# Patient Record
Sex: FEMALE | Race: WHITE | NOT HISPANIC OR LATINO | Employment: FULL TIME | URBAN - METROPOLITAN AREA
[De-identification: names, ages, dates, MRNs, and addresses within clinical notes are randomized per-mention and may not be internally consistent; named-entity substitution may affect disease eponyms.]

---

## 2019-05-07 ENCOUNTER — TRANSCRIBE ORDERS (OUTPATIENT)
Dept: LAB | Facility: HOSPITAL | Age: 24
End: 2019-05-07

## 2019-05-07 ENCOUNTER — LAB (OUTPATIENT)
Dept: LAB | Facility: HOSPITAL | Age: 24
End: 2019-05-07

## 2019-05-07 ENCOUNTER — LAB REQUISITION (OUTPATIENT)
Dept: LAB | Facility: HOSPITAL | Age: 24
End: 2019-05-07

## 2019-05-07 DIAGNOSIS — Z34.81 PRENATAL CARE, SUBSEQUENT PREGNANCY, FIRST TRIMESTER: ICD-10-CM

## 2019-05-07 DIAGNOSIS — Z3A.12 12 WEEKS GESTATION OF PREGNANCY: Primary | ICD-10-CM

## 2019-05-07 DIAGNOSIS — Z3A.12 12 WEEKS GESTATION OF PREGNANCY: ICD-10-CM

## 2019-05-07 DIAGNOSIS — Z00.00 ROUTINE GENERAL MEDICAL EXAMINATION AT A HEALTH CARE FACILITY: ICD-10-CM

## 2019-05-07 LAB
ABO GROUP BLD: NORMAL
BASOPHILS # BLD AUTO: 0.05 10*3/MM3 (ref 0–0.2)
BASOPHILS NFR BLD AUTO: 0.6 % (ref 0–1.5)
BLD GP AB SCN SERPL QL: NEGATIVE
DEPRECATED RDW RBC AUTO: 44.3 FL (ref 37–54)
EOSINOPHIL # BLD AUTO: 0.34 10*3/MM3 (ref 0–0.4)
EOSINOPHIL NFR BLD AUTO: 3.8 % (ref 0.3–6.2)
ERYTHROCYTE [DISTWIDTH] IN BLOOD BY AUTOMATED COUNT: 13.3 % (ref 12.3–15.4)
GLUCOSE BLD-MCNC: 71 MG/DL (ref 65–99)
HBV SURFACE AG SERPL QL IA: NORMAL
HCT VFR BLD AUTO: 41.8 % (ref 34–46.6)
HCV AB SER DONR QL: NORMAL
HGB BLD-MCNC: 13.6 G/DL (ref 12–15.9)
HIV1+2 AB SER QL: NORMAL
IMM GRANULOCYTES # BLD AUTO: 0.04 10*3/MM3 (ref 0–0.05)
IMM GRANULOCYTES NFR BLD AUTO: 0.4 % (ref 0–0.5)
LYMPHOCYTES # BLD AUTO: 1.89 10*3/MM3 (ref 0.7–3.1)
LYMPHOCYTES NFR BLD AUTO: 21.2 % (ref 19.6–45.3)
MCH RBC QN AUTO: 29.4 PG (ref 26.6–33)
MCHC RBC AUTO-ENTMCNC: 32.5 G/DL (ref 31.5–35.7)
MCV RBC AUTO: 90.5 FL (ref 79–97)
MONOCYTES # BLD AUTO: 0.57 10*3/MM3 (ref 0.1–0.9)
MONOCYTES NFR BLD AUTO: 6.4 % (ref 5–12)
NEUTROPHILS # BLD AUTO: 6.04 10*3/MM3 (ref 1.7–7)
NEUTROPHILS NFR BLD AUTO: 67.6 % (ref 42.7–76)
NRBC BLD AUTO-RTO: 0 /100 WBC (ref 0–0.2)
PLATELET # BLD AUTO: 315 10*3/MM3 (ref 140–450)
PMV BLD AUTO: 12.2 FL (ref 6–12)
RBC # BLD AUTO: 4.62 10*6/MM3 (ref 3.77–5.28)
RH BLD: POSITIVE
WBC NRBC COR # BLD: 8.93 10*3/MM3 (ref 3.4–10.8)

## 2019-05-07 PROCEDURE — 36415 COLL VENOUS BLD VENIPUNCTURE: CPT

## 2019-05-07 PROCEDURE — 86803 HEPATITIS C AB TEST: CPT

## 2019-05-07 PROCEDURE — 82947 ASSAY GLUCOSE BLOOD QUANT: CPT

## 2019-05-07 PROCEDURE — 80081 OBSTETRIC PANEL INC HIV TSTG: CPT

## 2019-05-08 LAB
RPR SER QL: NORMAL
RUBV IGG SERPL IA-ACNC: NEGATIVE

## 2019-06-04 ENCOUNTER — LAB REQUISITION (OUTPATIENT)
Dept: LAB | Facility: HOSPITAL | Age: 24
End: 2019-06-04

## 2019-06-04 DIAGNOSIS — Z00.00 ROUTINE GENERAL MEDICAL EXAMINATION AT A HEALTH CARE FACILITY: ICD-10-CM

## 2019-06-04 PROCEDURE — 36415 COLL VENOUS BLD VENIPUNCTURE: CPT

## 2019-06-13 ENCOUNTER — TRANSCRIBE ORDERS (OUTPATIENT)
Dept: OBSTETRICS AND GYNECOLOGY | Facility: HOSPITAL | Age: 24
End: 2019-06-13

## 2019-06-13 DIAGNOSIS — O28.5 ABNORMAL GENETIC TEST DURING PREGNANCY: Primary | ICD-10-CM

## 2019-06-24 ENCOUNTER — OFFICE VISIT (OUTPATIENT)
Dept: OBSTETRICS AND GYNECOLOGY | Facility: HOSPITAL | Age: 24
End: 2019-06-24

## 2019-06-24 ENCOUNTER — APPOINTMENT (OUTPATIENT)
Dept: WOMENS IMAGING | Facility: HOSPITAL | Age: 24
End: 2019-06-24

## 2019-06-24 ENCOUNTER — HOSPITAL ENCOUNTER (OUTPATIENT)
Dept: WOMENS IMAGING | Facility: HOSPITAL | Age: 24
Discharge: HOME | End: 2019-06-24
Admitting: OBSTETRICS & GYNECOLOGY

## 2019-06-24 VITALS
HEIGHT: 60 IN | WEIGHT: 161.8 LBS | BODY MASS INDEX: 31.77 KG/M2 | DIASTOLIC BLOOD PRESSURE: 67 MMHG | SYSTOLIC BLOOD PRESSURE: 108 MMHG

## 2019-06-24 DIAGNOSIS — O44.20 MARGINAL PLACENTA PREVIA: ICD-10-CM

## 2019-06-24 DIAGNOSIS — O28.5 ABNORMAL GENETIC TEST DURING PREGNANCY: Primary | ICD-10-CM

## 2019-06-24 DIAGNOSIS — O28.5 ABNORMAL GENETIC TEST DURING PREGNANCY: ICD-10-CM

## 2019-06-24 DIAGNOSIS — Z34.90 PREGNANCY, UNSPECIFIED GESTATIONAL AGE: ICD-10-CM

## 2019-06-24 PROCEDURE — 76817 TRANSVAGINAL US OBSTETRIC: CPT

## 2019-06-24 PROCEDURE — 76811 OB US DETAILED SNGL FETUS: CPT

## 2019-06-24 PROCEDURE — 76817 TRANSVAGINAL US OBSTETRIC: CPT | Performed by: OBSTETRICS & GYNECOLOGY

## 2019-06-24 PROCEDURE — 76811 OB US DETAILED SNGL FETUS: CPT | Performed by: OBSTETRICS & GYNECOLOGY

## 2019-06-24 RX ORDER — PRENATAL WITH FERROUS FUM AND FOLIC ACID 3080; 920; 120; 400; 22; 1.84; 3; 20; 10; 1; 12; 200; 27; 25; 2 [IU]/1; [IU]/1; MG/1; [IU]/1; MG/1; MG/1; MG/1; MG/1; MG/1; MG/1; UG/1; MG/1; MG/1; MG/1; MG/1
1 TABLET ORAL DAILY
COMMUNITY
End: 2022-10-25

## 2019-06-24 NOTE — PROGRESS NOTES
Denies problems. Next OB visit is 7/2/2019. Reports [facility] NIPS noted female gender but no report for Monosomy X twice; was negative for Trisomy 13, 18 & 21.

## 2019-08-17 ENCOUNTER — HOSPITAL ENCOUNTER (OUTPATIENT)
Facility: HOSPITAL | Age: 24
Discharge: HOME | End: 2019-08-17
Attending: OBSTETRICS & GYNECOLOGY | Admitting: OBSTETRICS & GYNECOLOGY

## 2019-08-17 ENCOUNTER — DOCUMENTATION (OUTPATIENT)
Dept: OBSTETRICS AND GYNECOLOGY | Facility: CLINIC | Age: 24
End: 2019-08-17

## 2019-08-17 VITALS
HEIGHT: 60 IN | TEMPERATURE: 98.4 F | DIASTOLIC BLOOD PRESSURE: 81 MMHG | SYSTOLIC BLOOD PRESSURE: 113 MMHG | WEIGHT: 164 LBS | RESPIRATION RATE: 16 BRPM | BODY MASS INDEX: 32.2 KG/M2

## 2019-08-17 LAB
BILIRUB UR QL STRIP: NEGATIVE
CLARITY UR: CLEAR
COLOR UR: YELLOW
GLUCOSE UR STRIP-MCNC: NEGATIVE MG/DL
HGB UR QL STRIP.AUTO: NEGATIVE
KETONES UR QL STRIP: NEGATIVE
LEUKOCYTE ESTERASE UR QL STRIP.AUTO: NEGATIVE
NITRITE UR QL STRIP: NEGATIVE
PH UR STRIP.AUTO: 7 [PH] (ref 5–8)
PROT UR QL STRIP: NEGATIVE
SP GR UR STRIP: <=1.005 (ref 1–1.03)
UROBILINOGEN UR QL STRIP: NORMAL

## 2019-08-17 PROCEDURE — 59025 FETAL NON-STRESS TEST: CPT

## 2019-08-17 PROCEDURE — 81003 URINALYSIS AUTO W/O SCOPE: CPT | Performed by: OBSTETRICS & GYNECOLOGY

## 2019-08-17 PROCEDURE — G0463 HOSPITAL OUTPT CLINIC VISIT: HCPCS

## 2019-08-17 PROCEDURE — 87086 URINE CULTURE/COLONY COUNT: CPT | Performed by: OBSTETRICS & GYNECOLOGY

## 2019-08-18 NOTE — PROGRESS NOTES
Erin Mckeon  1995  [id number]  [id number]    CC: contractions  HPI:  Patient is 24 y.o. female   currently at 26w6d presents with c/o uterine contractions, onset ~1600, intermittent, non-painful, denies assoc vag bleeding or SROM, non-radiating, denies recent intercourse.  Good FM.  BPNC to date.    PMH:  Current meds: PNV  Illnesses: none  Surgeries: none  Allergies: NKDA    Past OB History:       Obstetric History       T0      L0     SAB0   TAB0   Ectopic0   Molar0   Multiple0   Live Births0       # Outcome Date GA Lbr Dwight/2nd Weight Sex Delivery Anes PTL Lv   1 Current                        SH: tob neg , EtOH neg, drugs neg  FH: heart dz neg , diabetes pos , cancer pos    General ROS: contractions.   All other systems reviewed and are negative.      Physical Examination: General appearance - alert, well appearing, and in no distress  Vital signs - LMP 02/10/2019   HEENT: normocephalic, atraumatic,oropharynx clear, appearance of ears and nose normal  Neck - supple, no significant adenopathy, no thyromegaly  Lymphatics - no palpable lymphadenopathy in the neck or groin, no hepatosplenomegaly  Chest - clear to auscultation, no wheezes, rales or rhonchi, respiratory effort non-labored  Heart - normal rate, regular rhythm, normal S1, S2, no murmurs, rubs, clicks or gallops, no JVD, no lower extremity edema  Abdomen - soft, nontender, nondistended, no masses, no hepatosplenomegaly  no rebound tenderness noted, bowel sounds normal  Vaginal Exam: cl/50%/ballot, no blood in vault ,external genitalia normal  Extremities - no pedal edema noted, no calf tend  Skin -warm and dry, normal coloration and turgor, no rashes, no suspicious skin lesions noted      Fetal monitoring:reactive, normal baseline, good variability, no decels, rare contractioins    Radiology     Assessment 1)IUP 26 6/7 weeks    2) contractions- no evid STORM, ccUA neg    Plan 1)observe     2)home    3)keep next sched  appt    Jaxson Jennings MD  8/17/2019  11:32 PM

## 2019-08-19 LAB — BACTERIA SPEC AEROBE CULT: NO GROWTH

## 2019-08-28 ENCOUNTER — TRANSCRIBE ORDERS (OUTPATIENT)
Dept: LAB | Facility: HOSPITAL | Age: 24
End: 2019-08-28

## 2019-08-28 ENCOUNTER — LAB (OUTPATIENT)
Dept: LAB | Facility: HOSPITAL | Age: 24
End: 2019-08-28

## 2019-08-28 DIAGNOSIS — Z3A.28 28 WEEKS GESTATION OF PREGNANCY: Primary | ICD-10-CM

## 2019-08-28 DIAGNOSIS — Z3A.28 28 WEEKS GESTATION OF PREGNANCY: ICD-10-CM

## 2019-08-28 LAB
BLD GP AB SCN SERPL QL: NEGATIVE
DEPRECATED RDW RBC AUTO: 44.4 FL (ref 37–54)
ERYTHROCYTE [DISTWIDTH] IN BLOOD BY AUTOMATED COUNT: 13 % (ref 12.3–15.4)
GLUCOSE 1H P 100 G GLC PO SERPL-MCNC: 90 MG/DL
HCT VFR BLD AUTO: 36.9 % (ref 34–46.6)
HGB BLD-MCNC: 11.9 G/DL (ref 12–15.9)
MCH RBC QN AUTO: 30 PG (ref 26.6–33)
MCHC RBC AUTO-ENTMCNC: 32.2 G/DL (ref 31.5–35.7)
MCV RBC AUTO: 92.9 FL (ref 79–97)
PLATELET # BLD AUTO: 270 10*3/MM3 (ref 140–450)
PMV BLD AUTO: 12.1 FL (ref 6–12)
RBC # BLD AUTO: 3.97 10*6/MM3 (ref 3.77–5.28)
WBC NRBC COR # BLD: 12.79 10*3/MM3 (ref 3.4–10.8)

## 2019-08-28 PROCEDURE — 86850 RBC ANTIBODY SCREEN: CPT

## 2019-08-28 PROCEDURE — 85027 COMPLETE CBC AUTOMATED: CPT

## 2019-08-28 PROCEDURE — 36415 COLL VENOUS BLD VENIPUNCTURE: CPT

## 2019-08-28 PROCEDURE — 82950 GLUCOSE TEST: CPT

## 2019-09-23 ENCOUNTER — HOSPITAL ENCOUNTER (OUTPATIENT)
Dept: WOMENS IMAGING | Facility: HOSPITAL | Age: 24
Discharge: HOME | End: 2019-09-23
Admitting: OBSTETRICS & GYNECOLOGY

## 2019-09-23 ENCOUNTER — OFFICE VISIT (OUTPATIENT)
Dept: OBSTETRICS AND GYNECOLOGY | Facility: HOSPITAL | Age: 24
End: 2019-09-23

## 2019-09-23 VITALS — BODY MASS INDEX: 33.83 KG/M2 | WEIGHT: 173.2 LBS | SYSTOLIC BLOOD PRESSURE: 124 MMHG | DIASTOLIC BLOOD PRESSURE: 74 MMHG

## 2019-09-23 DIAGNOSIS — O28.5 ABNORMAL GENETIC TEST DURING PREGNANCY: Primary | ICD-10-CM

## 2019-09-23 DIAGNOSIS — O28.5 ABNORMAL GENETIC TEST DURING PREGNANCY: ICD-10-CM

## 2019-09-23 DIAGNOSIS — O44.20 MARGINAL PLACENTA PREVIA: ICD-10-CM

## 2019-09-23 DIAGNOSIS — Z3A.32 32 WEEKS GESTATION OF PREGNANCY: ICD-10-CM

## 2019-09-23 DIAGNOSIS — Z34.90 PREGNANCY, UNSPECIFIED GESTATIONAL AGE: ICD-10-CM

## 2019-09-23 PROCEDURE — 76816 OB US FOLLOW-UP PER FETUS: CPT

## 2019-09-23 PROCEDURE — 76816 OB US FOLLOW-UP PER FETUS: CPT | Performed by: OBSTETRICS & GYNECOLOGY

## 2019-10-06 ENCOUNTER — APPOINTMENT (OUTPATIENT)
Dept: CT IMAGING | Facility: HOSPITAL | Age: 24
End: 2019-10-06

## 2019-10-06 ENCOUNTER — APPOINTMENT (OUTPATIENT)
Dept: ULTRASOUND IMAGING | Facility: HOSPITAL | Age: 24
End: 2019-10-06

## 2019-10-06 ENCOUNTER — HOSPITAL ENCOUNTER (OUTPATIENT)
Facility: HOSPITAL | Age: 24
Setting detail: OBSERVATION
Discharge: HOME | End: 2019-10-07
Attending: OBSTETRICS & GYNECOLOGY | Admitting: OBSTETRICS & GYNECOLOGY

## 2019-10-06 PROBLEM — R10.9 ABDOMINAL PAIN IN PREGNANCY, THIRD TRIMESTER: Status: ACTIVE | Noted: 2019-10-06

## 2019-10-06 PROBLEM — O26.893 ABDOMINAL PAIN IN PREGNANCY, THIRD TRIMESTER: Status: ACTIVE | Noted: 2019-10-06

## 2019-10-06 LAB
ALBUMIN SERPL-MCNC: 3.6 G/DL (ref 3.5–5.2)
ALBUMIN/GLOB SERPL: 1.2 G/DL
ALP SERPL-CCNC: 124 U/L (ref 39–117)
ALT SERPL W P-5'-P-CCNC: 22 U/L (ref 1–33)
AMYLASE SERPL-CCNC: 77 U/L (ref 28–100)
ANION GAP SERPL CALCULATED.3IONS-SCNC: 19 MMOL/L (ref 5–15)
AST SERPL-CCNC: 21 U/L (ref 1–32)
BASOPHILS # BLD AUTO: 0.05 10*3/MM3 (ref 0–0.2)
BASOPHILS NFR BLD AUTO: 0.4 % (ref 0–1.5)
BILIRUB SERPL-MCNC: 0.3 MG/DL (ref 0.2–1.2)
BILIRUB UR QL STRIP: NEGATIVE
BUN BLD-MCNC: 6 MG/DL (ref 6–20)
BUN/CREAT SERPL: 13.6 (ref 7–25)
CALCIUM SPEC-SCNC: 8.9 MG/DL (ref 8.6–10.5)
CHLORIDE SERPL-SCNC: 101 MMOL/L (ref 98–107)
CLARITY UR: CLEAR
CO2 SERPL-SCNC: 18 MMOL/L (ref 22–29)
COLOR UR: YELLOW
CREAT BLD-MCNC: 0.44 MG/DL (ref 0.57–1)
DEPRECATED RDW RBC AUTO: 42.2 FL (ref 37–54)
EOSINOPHIL # BLD AUTO: 0.23 10*3/MM3 (ref 0–0.4)
EOSINOPHIL NFR BLD AUTO: 1.7 % (ref 0.3–6.2)
ERYTHROCYTE [DISTWIDTH] IN BLOOD BY AUTOMATED COUNT: 13 % (ref 12.3–15.4)
GFR SERPL CREATININE-BSD FRML MDRD: >150 ML/MIN/1.73
GLOBULIN UR ELPH-MCNC: 2.9 GM/DL
GLUCOSE BLD-MCNC: 96 MG/DL (ref 65–99)
GLUCOSE UR STRIP-MCNC: NEGATIVE MG/DL
HCT VFR BLD AUTO: 38.1 % (ref 34–46.6)
HGB BLD-MCNC: 12.4 G/DL (ref 12–15.9)
HGB UR QL STRIP.AUTO: NEGATIVE
IMM GRANULOCYTES # BLD AUTO: 0.19 10*3/MM3 (ref 0–0.05)
IMM GRANULOCYTES NFR BLD AUTO: 1.4 % (ref 0–0.5)
KETONES UR QL STRIP: ABNORMAL
LDH SERPL-CCNC: 187 U/L (ref 135–214)
LEUKOCYTE ESTERASE UR QL STRIP.AUTO: NEGATIVE
LIPASE SERPL-CCNC: 15 U/L (ref 13–60)
LYMPHOCYTES # BLD AUTO: 1.68 10*3/MM3 (ref 0.7–3.1)
LYMPHOCYTES NFR BLD AUTO: 12.5 % (ref 19.6–45.3)
MCH RBC QN AUTO: 29.1 PG (ref 26.6–33)
MCHC RBC AUTO-ENTMCNC: 32.5 G/DL (ref 31.5–35.7)
MCV RBC AUTO: 89.4 FL (ref 79–97)
MONOCYTES # BLD AUTO: 0.74 10*3/MM3 (ref 0.1–0.9)
MONOCYTES NFR BLD AUTO: 5.5 % (ref 5–12)
NEUTROPHILS # BLD AUTO: 10.56 10*3/MM3 (ref 1.7–7)
NEUTROPHILS NFR BLD AUTO: 78.5 % (ref 42.7–76)
NITRITE UR QL STRIP: NEGATIVE
NRBC BLD AUTO-RTO: 0 /100 WBC (ref 0–0.2)
PH UR STRIP.AUTO: 6.5 [PH] (ref 5–8)
PLATELET # BLD AUTO: 253 10*3/MM3 (ref 140–450)
PMV BLD AUTO: 11.2 FL (ref 6–12)
POTASSIUM BLD-SCNC: 2.8 MMOL/L (ref 3.5–5.2)
PROT SERPL-MCNC: 6.5 G/DL (ref 6–8.5)
PROT UR QL STRIP: NEGATIVE
RBC # BLD AUTO: 4.26 10*6/MM3 (ref 3.77–5.28)
SODIUM BLD-SCNC: 138 MMOL/L (ref 136–145)
SP GR UR STRIP: 1.01 (ref 1–1.03)
URATE SERPL-MCNC: 4 MG/DL (ref 2.4–5.7)
UROBILINOGEN UR QL STRIP: ABNORMAL
WBC NRBC COR # BLD: 13.45 10*3/MM3 (ref 3.4–10.8)

## 2019-10-06 PROCEDURE — 96376 TX/PRO/DX INJ SAME DRUG ADON: CPT

## 2019-10-06 PROCEDURE — 81003 URINALYSIS AUTO W/O SCOPE: CPT | Performed by: OBSTETRICS & GYNECOLOGY

## 2019-10-06 PROCEDURE — 25010000002 BUTORPHANOL PER 1 MG: Performed by: OBSTETRICS & GYNECOLOGY

## 2019-10-06 PROCEDURE — 84550 ASSAY OF BLOOD/URIC ACID: CPT | Performed by: OBSTETRICS & GYNECOLOGY

## 2019-10-06 PROCEDURE — 96372 THER/PROPH/DIAG INJ SC/IM: CPT

## 2019-10-06 PROCEDURE — 25010000002 BETAMETHASONE ACET & SOD PHOS PER 4 MG: Performed by: OBSTETRICS & GYNECOLOGY

## 2019-10-06 PROCEDURE — 83615 LACTATE (LD) (LDH) ENZYME: CPT | Performed by: OBSTETRICS & GYNECOLOGY

## 2019-10-06 PROCEDURE — 76705 ECHO EXAM OF ABDOMEN: CPT

## 2019-10-06 PROCEDURE — 80053 COMPREHEN METABOLIC PANEL: CPT | Performed by: OBSTETRICS & GYNECOLOGY

## 2019-10-06 PROCEDURE — G0378 HOSPITAL OBSERVATION PER HR: HCPCS

## 2019-10-06 PROCEDURE — 82150 ASSAY OF AMYLASE: CPT | Performed by: OBSTETRICS & GYNECOLOGY

## 2019-10-06 PROCEDURE — 99219 PR INITIAL OBSERVATION CARE/DAY 50 MINUTES: CPT | Performed by: OBSTETRICS & GYNECOLOGY

## 2019-10-06 PROCEDURE — 25010000002 ONDANSETRON PER 1 MG: Performed by: OBSTETRICS & GYNECOLOGY

## 2019-10-06 PROCEDURE — 87086 URINE CULTURE/COLONY COUNT: CPT | Performed by: OBSTETRICS & GYNECOLOGY

## 2019-10-06 PROCEDURE — 25010000002 TERBUTALINE PER 1 MG: Performed by: OBSTETRICS & GYNECOLOGY

## 2019-10-06 PROCEDURE — 96365 THER/PROPH/DIAG IV INF INIT: CPT

## 2019-10-06 PROCEDURE — 96375 TX/PRO/DX INJ NEW DRUG ADDON: CPT

## 2019-10-06 PROCEDURE — 83690 ASSAY OF LIPASE: CPT | Performed by: OBSTETRICS & GYNECOLOGY

## 2019-10-06 PROCEDURE — 25010000002 HYDROMORPHONE PER 4 MG: Performed by: OBSTETRICS & GYNECOLOGY

## 2019-10-06 PROCEDURE — 59025 FETAL NON-STRESS TEST: CPT

## 2019-10-06 PROCEDURE — 85025 COMPLETE CBC W/AUTO DIFF WBC: CPT | Performed by: OBSTETRICS & GYNECOLOGY

## 2019-10-06 PROCEDURE — 25010000002 PROMETHAZINE PER 50 MG: Performed by: OBSTETRICS & GYNECOLOGY

## 2019-10-06 PROCEDURE — 25010000003 POTASSIUM CHLORIDE 10 MEQ/100ML SOLUTION: Performed by: OBSTETRICS & GYNECOLOGY

## 2019-10-06 PROCEDURE — 25010000002 IOPAMIDOL 61 % SOLUTION: Performed by: OBSTETRICS & GYNECOLOGY

## 2019-10-06 PROCEDURE — 74177 CT ABD & PELVIS W/CONTRAST: CPT

## 2019-10-06 PROCEDURE — 36415 COLL VENOUS BLD VENIPUNCTURE: CPT | Performed by: OBSTETRICS & GYNECOLOGY

## 2019-10-06 PROCEDURE — 96366 THER/PROPH/DIAG IV INF ADDON: CPT

## 2019-10-06 RX ORDER — ONDANSETRON 2 MG/ML
4 INJECTION INTRAMUSCULAR; INTRAVENOUS EVERY 6 HOURS PRN
Status: DISCONTINUED | OUTPATIENT
Start: 2019-10-06 | End: 2019-10-07 | Stop reason: HOSPADM

## 2019-10-06 RX ORDER — BETAMETHASONE SODIUM PHOSPHATE AND BETAMETHASONE ACETATE 3; 3 MG/ML; MG/ML
12 INJECTION, SUSPENSION INTRA-ARTICULAR; INTRALESIONAL; INTRAMUSCULAR; SOFT TISSUE EVERY 24 HOURS
Status: DISCONTINUED | OUTPATIENT
Start: 2019-10-06 | End: 2019-10-07 | Stop reason: HOSPADM

## 2019-10-06 RX ORDER — MAGNESIUM SULFATE HEPTAHYDRATE 40 MG/ML
2 INJECTION, SOLUTION INTRAVENOUS CONTINUOUS
Status: DISCONTINUED | OUTPATIENT
Start: 2019-10-06 | End: 2019-10-07

## 2019-10-06 RX ORDER — BUTORPHANOL TARTRATE 1 MG/ML
1 INJECTION, SOLUTION INTRAMUSCULAR; INTRAVENOUS EVERY 4 HOURS PRN
Status: DISCONTINUED | OUTPATIENT
Start: 2019-10-06 | End: 2019-10-07 | Stop reason: HOSPADM

## 2019-10-06 RX ORDER — DEXTROSE AND SODIUM CHLORIDE 5; .2 G/100ML; G/100ML
75 INJECTION, SOLUTION INTRAVENOUS CONTINUOUS
Status: DISCONTINUED | OUTPATIENT
Start: 2019-10-06 | End: 2019-10-07

## 2019-10-06 RX ORDER — SODIUM CHLORIDE, SODIUM LACTATE, POTASSIUM CHLORIDE, CALCIUM CHLORIDE 600; 310; 30; 20 MG/100ML; MG/100ML; MG/100ML; MG/100ML
125 INJECTION, SOLUTION INTRAVENOUS CONTINUOUS
Status: DISCONTINUED | OUTPATIENT
Start: 2019-10-06 | End: 2019-10-07

## 2019-10-06 RX ORDER — TERBUTALINE SULFATE 1 MG/ML
0.25 INJECTION, SOLUTION SUBCUTANEOUS ONCE
Status: COMPLETED | OUTPATIENT
Start: 2019-10-06 | End: 2019-10-06

## 2019-10-06 RX ORDER — POTASSIUM CHLORIDE 7.45 MG/ML
10 INJECTION INTRAVENOUS
Status: COMPLETED | OUTPATIENT
Start: 2019-10-06 | End: 2019-10-06

## 2019-10-06 RX ORDER — HYDROMORPHONE HYDROCHLORIDE 1 MG/ML
0.5 INJECTION, SOLUTION INTRAMUSCULAR; INTRAVENOUS; SUBCUTANEOUS
Status: DISCONTINUED | OUTPATIENT
Start: 2019-10-06 | End: 2019-10-07 | Stop reason: HOSPADM

## 2019-10-06 RX ORDER — PROMETHAZINE HYDROCHLORIDE 25 MG/ML
12.5 INJECTION, SOLUTION INTRAMUSCULAR; INTRAVENOUS EVERY 6 HOURS PRN
Status: DISCONTINUED | OUTPATIENT
Start: 2019-10-06 | End: 2019-10-07 | Stop reason: HOSPADM

## 2019-10-06 RX ORDER — SODIUM CHLORIDE 0.9 % (FLUSH) 0.9 %
10 SYRINGE (ML) INJECTION EVERY 8 HOURS
Status: DISCONTINUED | OUTPATIENT
Start: 2019-10-06 | End: 2019-10-07 | Stop reason: HOSPADM

## 2019-10-06 RX ORDER — HYDROMORPHONE HYDROCHLORIDE 1 MG/ML
0.5 INJECTION, SOLUTION INTRAMUSCULAR; INTRAVENOUS; SUBCUTANEOUS ONCE
Status: COMPLETED | OUTPATIENT
Start: 2019-10-06 | End: 2019-10-06

## 2019-10-06 RX ORDER — SODIUM CHLORIDE 0.9 % (FLUSH) 0.9 %
10 SYRINGE (ML) INJECTION AS NEEDED
Status: DISCONTINUED | OUTPATIENT
Start: 2019-10-06 | End: 2019-10-07 | Stop reason: HOSPADM

## 2019-10-06 RX ADMIN — HYDROMORPHONE HYDROCHLORIDE 0.5 MG: 1 INJECTION, SOLUTION INTRAMUSCULAR; INTRAVENOUS; SUBCUTANEOUS at 20:32

## 2019-10-06 RX ADMIN — ONDANSETRON 4 MG: 2 INJECTION INTRAMUSCULAR; INTRAVENOUS at 16:00

## 2019-10-06 RX ADMIN — PROMETHAZINE HYDROCHLORIDE 12.5 MG: 25 INJECTION INTRAMUSCULAR; INTRAVENOUS at 17:30

## 2019-10-06 RX ADMIN — POTASSIUM CHLORIDE 10 MEQ: 7.46 INJECTION, SOLUTION INTRAVENOUS at 18:30

## 2019-10-06 RX ADMIN — POTASSIUM CHLORIDE 10 MEQ: 7.46 INJECTION, SOLUTION INTRAVENOUS at 22:52

## 2019-10-06 RX ADMIN — BETAMETHASONE SODIUM PHOSPHATE AND BETAMETHASONE ACETATE 12 MG: 3; 3 INJECTION, SUSPENSION INTRA-ARTICULAR; INTRALESIONAL; INTRAMUSCULAR at 20:39

## 2019-10-06 RX ADMIN — BUTORPHANOL TARTRATE 1 MG: 1 INJECTION, SOLUTION INTRAMUSCULAR; INTRAVENOUS at 17:31

## 2019-10-06 RX ADMIN — ONDANSETRON 4 MG: 2 INJECTION INTRAMUSCULAR; INTRAVENOUS at 20:25

## 2019-10-06 RX ADMIN — SODIUM CHLORIDE, POTASSIUM CHLORIDE, SODIUM LACTATE AND CALCIUM CHLORIDE 125 ML/HR: 600; 310; 30; 20 INJECTION, SOLUTION INTRAVENOUS at 16:45

## 2019-10-06 RX ADMIN — TERBUTALINE SULFATE 0.25 MG: 1 INJECTION SUBCUTANEOUS at 14:35

## 2019-10-06 RX ADMIN — POTASSIUM CHLORIDE 10 MEQ: 7.46 INJECTION, SOLUTION INTRAVENOUS at 21:42

## 2019-10-06 RX ADMIN — IOPAMIDOL 99 ML: 612 INJECTION, SOLUTION INTRAVENOUS at 21:06

## 2019-10-06 RX ADMIN — POTASSIUM CHLORIDE 10 MEQ: 7.46 INJECTION, SOLUTION INTRAVENOUS at 20:28

## 2019-10-06 RX ADMIN — MAGNESIUM SULFATE HEPTAHYDRATE 2 G/HR: 40 INJECTION, SOLUTION INTRAVENOUS at 20:46

## 2019-10-06 NOTE — H&P
[facility]  Obstetric History and Physical    Chief Complaint   Patient presents with   • Contractions       Subjective     Patient is a 24 y.o. female  currently at 34w0d, who presents with complaints of contractions beginning at approximately 10 AM.  She also reports right middle quadrant pain and associated nausea on direct palpation of her right middle quadrant.  She denies anorexia, bowel symptoms, fever/chills or vomiting.  She denies dysuria, vaginal bleeding or leakage of fluid.    Her prenatal care is reportedly benign. Her previous obstetric/gynecological history is noncontributory.    The following portions of the patients history were reviewed and updated as appropriate: current medications, allergies, past medical history, past surgical history, past family history, past social history and problem list .        Prenatal Information:   Maternal Prenatal Labs  Blood Type No results found for: ABO   Rh Status No results found for: RH   Antibody Screen No results found for: ABSCRN   Gonnorhea No results found for: GCCX   Chlamydia No results found for: CLAMYDCU   RPR No results found for: RPR   Syphilis Antibody No results found for: SYPHILIS   Rubella No results found for: RUBELLAIGGIN   Hepatitis B Surface Antigen No results found for: HEPBSAG   HIV-1 Antibody No results found for: LABHIV1   Hepatitis C Antibody No results found for: HEPCAB   Rapid Urin Drug Screen No results found for: AMPMETHU, BARBITSCNUR, LABBENZSCN, LABMETHSCN, LABOPIASCN, THCURSCR, COCAINEUR, AMPHETSCREEN, PROPOXSCN, BUPRENORSCNU, METAMPSCNUR, OXYCODONESCN, TRICYCLICSCN   Group B Strep Culture No results found for: GBSANTIGEN           External Prenatal Results     Pregnancy Outside Results - Transcribed From Office Records - See Scanned Records For Details     Test Value Date Time    Hgb 11.9 g/dL 19 1001    Hct 36.9 % 19 1001    ABO A  19 1346    Rh Positive  19 1346    Antibody Screen Negative   Discharge instructions done with parent on herself , and infant for home. Mother verbalized understanding of information given.   19 1001    Glucose Fasting GTT       Glucose Tolerance Test 1 hour       Glucose Tolerance Test 3 hour       Gonorrhea (discrete)       Chlamydia (discrete)       RPR Non-Reactive  19 1346    VDRL       Syphilis Antibody       Rubella Negative  19 1346    HBsAg Non-Reactive  19 1346    Herpes Simplex Virus PCR       Herpes Simplex VIrus Culture       HIV Non-Reactive  19 1346    Hep C RNA Quant PCR       Hep C Antibody Non-Reactive  19 1346    AFP       Group B Strep       GBS Susceptibility to Clindamycin       GBS Susceptibility to Erythromycin       Fetal Fibronectin       Genetic Testing, Maternal Blood             Drug Screening     Test Value Date Time    Urine Drug Screen       Amphetamine Screen       Barbiturate Screen       Benzodiazepine Screen       Methadone Screen       Phencyclidine Screen       Opiates Screen       THC Screen       Cocaine Screen       Propoxyphene Screen       Buprenorphine Screen       Methamphetamine Screen       Oxycodone Screen       Tricyclic Antidepressants Screen                     Past OB History:       Obstetric History       T0      L0     SAB0   TAB0   Ectopic0   Molar0   Multiple0   Live Births0       # Outcome Date GA Lbr Dwight/2nd Weight Sex Delivery Anes PTL Lv   1 Current                   Past Medical History:  History reviewed. No pertinent past medical history.   Past Surgical History No past surgical history on file.   Family History: History reviewed. No pertinent family history.   Social History:  reports that she has never smoked. She has never used smokeless tobacco.   reports that she does not drink alcohol.   reports that she does not use drugs.   Allergies: Eggs or egg-derived products  Current Medications:          No current facility-administered medications on file prior to encounter.      Current Outpatient Medications on File Prior to Encounter   Medication Sig Dispense Refill   • Prenatal Vit-Fe  Fumarate-FA (PRENATAL 27-1) 27-1 MG tablet tablet Take 1 tablet by mouth Daily.         General ROS: Pertinent items are noted in HPI, all other systems reviewed and negative    Objective       Vital Signs Range for the last 24 hours  Temperature: Temp:  [97.9 °F (36.6 °C)] 97.9 °F (36.6 °C)   Temp Source: Temp src: Oral   BP: BP: (122)/(81) 122/81   Pulse: Heart Rate:  [94] 94   Respirations: Resp:  [16] 16   SPO2:     O2 Amount (l/min):     O2 Devices     Weight:       Physical Examination: General appearance - alert, well appearing, and in no distress, oriented to person, place, and time and normal appearing weight  Mental status - alert, oriented to person, place, and time, normal mood, behavior, speech, dress, motor activity, and thought processes  Eyes - pupils equal and reactive, extraocular eye movements intact, sclera anicteric  Mouth - mucous membranes moist, pharynx normal without lesions and dental hygiene good  Neck - supple, no significant adenopathy, thyroid exam: thyroid is normal in size without nodules or tenderness  Chest - clear to auscultation, no wheezes, rales or rhonchi, symmetric air entry  Heart - normal rate, regular rhythm, normal S1, S2, no murmurs, rubs, clicks or gallops  Abdomen-soft, nontender, nondistended, no masses or organomegaly  no rebound tenderness noted  bowel sounds normal   Abdomen, Non-Tender  Pelvic - VULVA: normal appearing vulva with no masses, tenderness or lesions, CERVIX: 1 cm / 30%/-3 station. The cervix remains posterior and medium texture.  Neurological - alert, oriented, normal speech, no focal findings or movement disorder noted, DTR's normal and symmetric  Extremities - no pedal edema noted    Fetal Heart Rate Assessment  Indication: Complaining of contractions   Start Time: 1332                end Time: 1700   NST Results: NST reactive.  Baseline fetal heart rate 120-130 bpm.  Average variability with multiple 15 x 15 accelerations.  No decelerations.   "Not recording regular uterine contractions but patient is reporting contractions every 3-4 minutes.        Laboratory Results:   No results found for: ALKPHOS, ALT, AST, CREATININE, BILITOT, LDH, URICACID    No results found for: WBC, RBC, HGB, HCT, MCV, MCH, MCHC, RDW, RDWSD, MPV, PLT, NEUTRORELPCT, LYMPHORELPCT, MONORELPCT, EOSRELPCT, BASORELPCT, AUTOIGPER, NEUTROABS, LYMPHSABS, MONOSABS, EOSABS, BASOSABS, AUTOIGNUM, NRBC          Brief Urine Lab Results  (Last result in the past 365 days)      Color   Clarity   Blood   Leuk Est   Nitrite   Protein   CREAT   Urine HCG        08/17/19 2135 Yellow Clear Negative Negative Negative Negative                 Radiology Review: No new studies  Other Studies: Urinalysis sent.    Assessment/Plan       * No active hospital problems. *        Assessment:  1. Right middle quadrant abdominal pain in pregnancy.  No evidence of acute abdomen.  Symptoms and examination suspicious for biliary disease.  Normal LFTs on laboratory evaluation.  2. Marked hypokalemia.    Plan:  1. Observation admission.  2. GI rest and parenteral hydration with parenteral antiemetics as needed.  3. Gallbladder ultrasound ordered.  4. Mild leukocytosis on admission.  Patient is afebrile, doubt appendicitis.  Repeat CBC tomorrow morning.  If any further elevation in WBC or if patient becomes febrile, we will proceed with radiographic imaging to exclude appendicitis.     Total time spent today with Erin  was 50 minutes (level 4).  Of this time, > 50% was spent face-to-face time coordinating care, answering her questions and counseling regarding pathophysiology of her presenting problem along with plans for any diagnostic work-up and treatment.        Jose Rivera \"Lulu\" DOYLE Jenkins MD  10/6/2019  2:38 PM    "

## 2019-10-07 ENCOUNTER — HOSPITAL ENCOUNTER (OUTPATIENT)
Facility: HOSPITAL | Age: 24
Discharge: HOME | End: 2019-10-07
Attending: OBSTETRICS & GYNECOLOGY | Admitting: OBSTETRICS & GYNECOLOGY

## 2019-10-07 VITALS
TEMPERATURE: 98.4 F | RESPIRATION RATE: 16 BRPM | DIASTOLIC BLOOD PRESSURE: 90 MMHG | SYSTOLIC BLOOD PRESSURE: 128 MMHG | HEART RATE: 116 BPM

## 2019-10-07 VITALS
HEART RATE: 102 BPM | RESPIRATION RATE: 16 BRPM | OXYGEN SATURATION: 98 % | SYSTOLIC BLOOD PRESSURE: 106 MMHG | TEMPERATURE: 99 F | DIASTOLIC BLOOD PRESSURE: 61 MMHG

## 2019-10-07 LAB
BASOPHILS # BLD AUTO: 0.02 10*3/MM3 (ref 0–0.2)
BASOPHILS NFR BLD AUTO: 0.2 % (ref 0–1.5)
DEPRECATED RDW RBC AUTO: 47.6 FL (ref 37–54)
EOSINOPHIL # BLD AUTO: 0 10*3/MM3 (ref 0–0.4)
EOSINOPHIL NFR BLD AUTO: 0 % (ref 0.3–6.2)
ERYTHROCYTE [DISTWIDTH] IN BLOOD BY AUTOMATED COUNT: 13 % (ref 12.3–15.4)
HCT VFR BLD AUTO: 39.9 % (ref 34–46.6)
HGB BLD-MCNC: 11.4 G/DL (ref 12–15.9)
IMM GRANULOCYTES # BLD AUTO: 0.19 10*3/MM3 (ref 0–0.05)
IMM GRANULOCYTES NFR BLD AUTO: 1.5 % (ref 0–0.5)
LYMPHOCYTES # BLD AUTO: 1.05 10*3/MM3 (ref 0.7–3.1)
LYMPHOCYTES NFR BLD AUTO: 8.3 % (ref 19.6–45.3)
MCH RBC QN AUTO: 28.4 PG (ref 26.6–33)
MCHC RBC AUTO-ENTMCNC: 28.6 G/DL (ref 31.5–35.7)
MCV RBC AUTO: 99.3 FL (ref 79–97)
MONOCYTES # BLD AUTO: 0.39 10*3/MM3 (ref 0.1–0.9)
MONOCYTES NFR BLD AUTO: 3.1 % (ref 5–12)
NEUTROPHILS # BLD AUTO: 10.99 10*3/MM3 (ref 1.7–7)
NEUTROPHILS NFR BLD AUTO: 86.9 % (ref 42.7–76)
NRBC BLD AUTO-RTO: 0 /100 WBC (ref 0–0.2)
PLATELET # BLD AUTO: 243 10*3/MM3 (ref 140–450)
PMV BLD AUTO: 11.1 FL (ref 6–12)
POTASSIUM BLD-SCNC: 4.2 MMOL/L (ref 3.5–5.2)
RBC # BLD AUTO: 4.02 10*6/MM3 (ref 3.77–5.28)
WBC NRBC COR # BLD: 12.64 10*3/MM3 (ref 3.4–10.8)

## 2019-10-07 PROCEDURE — 59025 FETAL NON-STRESS TEST: CPT

## 2019-10-07 PROCEDURE — 25010000002 AMPICILLIN PER 500 MG: Performed by: OBSTETRICS & GYNECOLOGY

## 2019-10-07 PROCEDURE — 96366 THER/PROPH/DIAG IV INF ADDON: CPT

## 2019-10-07 PROCEDURE — G0378 HOSPITAL OBSERVATION PER HR: HCPCS

## 2019-10-07 PROCEDURE — 99214 OFFICE O/P EST MOD 30 MIN: CPT | Performed by: OBSTETRICS & GYNECOLOGY

## 2019-10-07 PROCEDURE — 85025 COMPLETE CBC W/AUTO DIFF WBC: CPT | Performed by: OBSTETRICS & GYNECOLOGY

## 2019-10-07 PROCEDURE — G0463 HOSPITAL OUTPT CLINIC VISIT: HCPCS

## 2019-10-07 PROCEDURE — 84132 ASSAY OF SERUM POTASSIUM: CPT | Performed by: OBSTETRICS & GYNECOLOGY

## 2019-10-07 RX ADMIN — AMPICILLIN 1 G: 1 INJECTION, POWDER, FOR SOLUTION INTRAMUSCULAR; INTRAVENOUS at 04:12

## 2019-10-07 RX ADMIN — DEXTROSE AND SODIUM CHLORIDE 75 ML/HR: 5; 200 INJECTION, SOLUTION INTRAVENOUS at 00:05

## 2019-10-07 RX ADMIN — AMPICILLIN 1 G: 1 INJECTION, POWDER, FOR SOLUTION INTRAMUSCULAR; INTRAVENOUS at 08:02

## 2019-10-07 RX ADMIN — AMPICILLIN SODIUM 2 G: 2 INJECTION, POWDER, FOR SOLUTION INTRAMUSCULAR; INTRAVENOUS at 00:05

## 2019-10-07 NOTE — PROGRESS NOTES
Erin is feeling well off of magnesium. She denies current abd pain or ctx. She notes good fetal movement. She has no VB or LOF. Will discharge home, has f/u appt scheduled in office tomorrow.

## 2019-10-07 NOTE — PROGRESS NOTES
10/7/2019  HD:0  24 y.o. yo female  at 34w1d    Diagnosis: RLQ pain    Subjective   Erin feels well this morning. She states she is confused by her plan of care. Her pain has improved. She denies contractions. She has no VB or LOF. She reports good fetal movement. She has no dysuria or hematuria.       Objective   Temp: Temp:  [97.9 °F (36.6 °C)-99.7 °F (37.6 °C)] 98.6 °F (37 °C) Temp src: Oral   BP: BP: ()/(45-84) 110/61        Pulse: Heart Rate:  [] 108  RR: Resp:  [16-20] 16    Fetal Heart Rate Assessment   Method:  external monitor   Beats/min:     Baseline:  120s   Variability:  moderate   Accels:  present (15x15)   Decels:  absent   Tracing Category:  I     NST:  REACTIVE    Uterine Assessment   Method:  external toco   Frequency (min):  quiet   Ctx Count in 10 min:     Duration:     Intensity:     Intensity by IUPC:     Resting Tone:     Resting Tone by IUPC:       General:  nad   Abdomen: Gravid, nontender   Extremities DTR 2/4     Presentation:  vtx   Cervix: Exam by:  laborist on admission   Dilation:  1cm   Effacement:  30%   Station:  -3         Labs:       Component      Latest Ref Rng & Units 10/6/2019   WBC      3.40 - 10.80 10*3/mm3 13.45 (H)   RBC      3.77 - 5.28 10*6/mm3 4.26   Hemoglobin      12.0 - 15.9 g/dL 12.4   Hematocrit      34.0 - 46.6 % 38.1   MCV      79.0 - 97.0 fL 89.4   MCH      26.6 - 33.0 pg 29.1   MCHC      31.5 - 35.7 g/dL 32.5   RDW      12.3 - 15.4 % 13.0   RDW-SD      37.0 - 54.0 fl 42.2   MPV      6.0 - 12.0 fL 11.2   Platelets      140 - 450 10*3/mm3 253   Neutrophil Rel %      42.7 - 76.0 % 78.5 (H)   Lymphocyte Rel %      19.6 - 45.3 % 12.5 (L)   Monocyte Rel %      5.0 - 12.0 % 5.5   Eosinophil Rel %      0.3 - 6.2 % 1.7   Basophil Rel %      0.0 - 1.5 % 0.4   Immature Granulocyte Rel %      0.0 - 0.5 % 1.4 (H)   Neutrophils Absolute      1.70 - 7.00 10*3/mm3 10.56 (H)   Lymphocytes Absolute      0.70 - 3.10 10*3/mm3 1.68   Monocytes Absolute       0.10 - 0.90 10*3/mm3 0.74   Eosinophils Absolute      0.00 - 0.40 10*3/mm3 0.23   Basophils Absolute      0.00 - 0.20 10*3/mm3 0.05   Immature Grans, Absolute      0.00 - 0.05 10*3/mm3 0.19 (H)   nRBC      0.0 - 0.2 /100 WBC 0.0   Glucose      65 - 99 mg/dL 96   BUN      6 - 20 mg/dL 6   Creatinine      0.57 - 1.00 mg/dL 0.44 (L)   Sodium      136 - 145 mmol/L 138   Potassium      3.5 - 5.2 mmol/L 2.8 (L)   Chloride      98 - 107 mmol/L 101   CO2      22.0 - 29.0 mmol/L 18.0 (L)   Calcium      8.6 - 10.5 mg/dL 8.9   Total Protein      6.0 - 8.5 g/dL 6.5   Albumin      3.50 - 5.20 g/dL 3.60   ALT (SGPT)      1 - 33 U/L 22   AST (SGOT)      1 - 32 U/L 21   Alkaline Phosphatase      39 - 117 U/L 124 (H)   Total Bilirubin      0.2 - 1.2 mg/dL 0.3   eGFR Non African Am      >60 mL/min/1.73 >150   Globulin      gm/dL 2.9   A/G Ratio      g/dL 1.2   BUN/Creatinine Ratio      7.0 - 25.0 13.6   Anion Gap      5.0 - 15.0 mmol/L 19.0 (H)   LDH      135 - 214 U/L 187   Uric Acid      2.4 - 5.7 mg/dL 4.0   Amylase      28 - 100 U/L 77   Lipase      13 - 60 U/L 15     Contains abnormal data Urinalysis With Culture If Indicated - Urine, Clean Catch   Order: [id number]   Status:  Final result   Visible to patient:  No (Not Released)   Specimen Information: Urine, Clean Catch        Component  Ref Range & Units 1d ago   Color, UA  Yellow, Straw Yellow    Appearance, UA  Clear Clear    pH, UA  5.0 - 8.0 6.5    Specific Gravity, UA  1.001 - 1.030 1.013    Glucose, UA  Negative Negative    Ketones, UA  Negative 80 mg/dL (3+) Abnormal     Bilirubin, UA  Negative Negative    Blood, UA  Negative Negative    Protein, UA  Negative Negative    Leuk Esterase, UA  Negative Negative    Nitrite, UA  Negative Negative    Urobilinogen, UA  0.2 - 1.0 E.U./dL 0.2 E.U./dL    Resulting Agency  [facility]      Narrative   Performed by:  [facility]   Urine microscopic not indicated.      Specimen Collected: 10/06/19 15:19   Last Resulted: 10/06/19 15:36              Study Result     EXAMINATION: US GALLBLADDER-     INDICATION: rmq abdominal pain in pregnancy     TECHNIQUE: Ultrasonographic imaging of the right upper quadrant was  performed utilizing a transabdominal technique.     COMPARISON: NONE     FINDINGS: The visualized portions of the pancreas do not demonstrate  obvious abnormality, although are difficult to evaluate secondary to  duodenal bowel gas artifact. The liver demonstrates homogeneous  echotexture without evidence for hepatic mass or lesion appreciated. No  perihepatic fluid identified. The gallbladder demonstrates no evidence  for echogenic foci to suggest Anastasiia lithiasis. The gallbladder wall is  not thickened. No pericholecystic fluid identified. The common bile duct  measures within normal limits at 0.2 cm. The right kidney measures 11.5  x 4.8 x 5.4 cm. There is a mildly prominent right renal pelvis which may  relate to mild hydronephrosis in the setting of pregnancy. No solid  renal mass or perinephric fluid collection identified.     IMPRESSION:     No evidence of cholelithiasis, gallbladder wall thickening, or  pericholecystic fluid.     Mild prominence of the right renal pelvis/mild hydronephrosis which may  be related to mild hydronephrosis in the setting of pregnancy.     Study Result     CT Abdomen Pelvis W     INDICATION:   24-year-old female with right lower quadrant abdominal pain beginning 11 hours ago. Patient is 34 weeks pregnant.     TECHNIQUE:   CT of the abdomen and pelvis with IV contrast. Coronal and sagittal reconstructions were obtained.  Radiation dose reduction techniques included automated exposure control or exposure modulation based on body size. Count of known CT and cardiac nuc med  studies performed in previous 12 months: 0.      COMPARISON:   None available.     FINDINGS:  Visualized lung bases are unremarkable.     Abdomen:   The liver is within normal limits. The spleen and pancreas are normal. The gallbladder is  normal. Both adrenal glands are normal. Mild bilateral hydroureteronephrosis, likely secondary to gravid uterus. There are some questionable punctate, sand-like  nonobstructing right intrarenal calculi. There is also a 2 mm calcification in the deep right pelvis. It is difficult to determine whether this represents a distal right ureteral stone versus a calcified vascular phlebolith. There is some very minimal  right perinephric inflammatory stranding and very subtle asymmetrically decreased enhancement of the right kidney compared to the left. Abdominal aorta normal in course and caliber. Small bowel is unremarkable without obstruction. Grossly unremarkable  appendix. No pericecal inflammation. The colon is unremarkable. No free fluid or free air.     Pelvis:   The urinary bladder is unremarkable.  Third trimester intrauterine gestation identified. No free pelvic fluid.     No acute osseous abnormality.        IMPRESSION:     1. Grossly unremarkable appendix. No pericecal inflammation. No free fluid in the abdomen or pelvis.  2. Third trimester intrauterine gestation.  3. Mild bilateral hydronephrosis of pregnancy. Questionable punctate, sand-like nonobstructing right intrarenal calculi. There is also a 2 mm calcification in the deep right pelvis. It is difficult to determine whether this represents a distal right  ureteral stone versus a calcified vascular phlebolith. Very minimal right perinephric inflammatory stranding noted. Very subtle asymmetrically decreased enhancement of the right kidney compared to the left. This may also represent physiologic changes  from pregnancy, but correlation to exclude superimposed pyelonephritis recommended.     NOTIFICATION: Results were called by telephone at the time of interpretation on 10/6/2019 9:51 PM to Jaxson Jennings MD who verbally acknowledged these results.        Signer Name: Magdaleno Garza MD   Signed: 10/6/2019 9:58 PM   Workstation Name: [email or web address]    [facility]         Assessment  1.   24 y.o. yo female  at 34w1d  2.   RLQ pain  3.   Possible kidney stone  4.   Hypokalemia    Plan  1. Maternal fetal status reassuring, FHR category I  2. Pt was started on magnesium sulfate overnight as well as ampicillin in case of  labor; will discontinue this AM as no current signs of  labor  3. S/p BMZ#1 @  (10/6/19)  4. CT scan with ?kidney stone; pain currently improved; pt tolerating PO hydration  5. S/p IV potassium replacement; AM labs pending  6. Continue observation throughout the morning, if feeling well later today may discharge home; has f/u appt scheduled in office tomorrow; can repeat BMZ early if discharged      This note has been electronically signed.    Quin Ambriz MD  8:43 AM  10/07/19

## 2019-10-07 NOTE — NURSING NOTE
D/C instructions provided to patient on when to return to hospital or call MD. Instructed to keep already scheduled appointment for tomorrow. No further questions.

## 2019-10-07 NOTE — DISCHARGE SUMMARY
Date of Discharge:  10/7/2019    Discharge Diagnosis: IUP @ 34w1d, RLQ pain (improved), possible kidney stone, threatened PTL    Presenting Problem/History of Present Illness  Abdominal pain in pregnancy, third trimester [O26.893, R10.9]       Hospital Course  Patient is a 24 y.o. female G1 who presented at 34w0d with c/o RLQ pain and contractions. No contractions were traced on toco, however could be palpated on exam. SVE noted to be /-3. Gallbladder US was obtained and was normal. Pt was admitted for IV hydration and GI rest. Subsequent CT abd/pelvis was ordered to r/o appendicitis which was normal. Possible kidney stone was seen. Pt was started on magnesium sulfate and administered BMZ for fetal lung maturity. On HD#2 pain had resolved and magnesium sulfate was discontinued. Pt had no signs/sx of  labor and was deemed stable for discharge home.    Procedures Performed         Consults:   Consults     No orders found for last 30 day(s).          Labs:  Labs:       Component      Latest Ref Rng & Units 10/6/2019   WBC      3.40 - 10.80 10*3/mm3 13.45 (H)   RBC      3.77 - 5.28 10*6/mm3 4.26   Hemoglobin      12.0 - 15.9 g/dL 12.4   Hematocrit      34.0 - 46.6 % 38.1   MCV      79.0 - 97.0 fL 89.4   MCH      26.6 - 33.0 pg 29.1   MCHC      31.5 - 35.7 g/dL 32.5   RDW      12.3 - 15.4 % 13.0   RDW-SD      37.0 - 54.0 fl 42.2   MPV      6.0 - 12.0 fL 11.2   Platelets      140 - 450 10*3/mm3 253   Neutrophil Rel %      42.7 - 76.0 % 78.5 (H)   Lymphocyte Rel %      19.6 - 45.3 % 12.5 (L)   Monocyte Rel %      5.0 - 12.0 % 5.5   Eosinophil Rel %      0.3 - 6.2 % 1.7   Basophil Rel %      0.0 - 1.5 % 0.4   Immature Granulocyte Rel %      0.0 - 0.5 % 1.4 (H)   Neutrophils Absolute      1.70 - 7.00 10*3/mm3 10.56 (H)   Lymphocytes Absolute      0.70 - 3.10 10*3/mm3 1.68   Monocytes Absolute      0.10 - 0.90 10*3/mm3 0.74   Eosinophils Absolute      0.00 - 0.40 10*3/mm3 0.23   Basophils Absolute      0.00 -  0.20 10*3/mm3 0.05   Immature Grans, Absolute      0.00 - 0.05 10*3/mm3 0.19 (H)   nRBC      0.0 - 0.2 /100 WBC 0.0   Glucose      65 - 99 mg/dL 96   BUN      6 - 20 mg/dL 6   Creatinine      0.57 - 1.00 mg/dL 0.44 (L)   Sodium      136 - 145 mmol/L 138   Potassium      3.5 - 5.2 mmol/L 2.8 (L)   Chloride      98 - 107 mmol/L 101   CO2      22.0 - 29.0 mmol/L 18.0 (L)   Calcium      8.6 - 10.5 mg/dL 8.9   Total Protein      6.0 - 8.5 g/dL 6.5   Albumin      3.50 - 5.20 g/dL 3.60   ALT (SGPT)      1 - 33 U/L 22   AST (SGOT)      1 - 32 U/L 21   Alkaline Phosphatase      39 - 117 U/L 124 (H)   Total Bilirubin      0.2 - 1.2 mg/dL 0.3   eGFR Non African Am      >60 mL/min/1.73 >150   Globulin      gm/dL 2.9   A/G Ratio      g/dL 1.2   BUN/Creatinine Ratio      7.0 - 25.0 13.6   Anion Gap      5.0 - 15.0 mmol/L 19.0 (H)   LDH      135 - 214 U/L 187   Uric Acid      2.4 - 5.7 mg/dL 4.0   Amylase      28 - 100 U/L 77   Lipase      13 - 60 U/L 15      Contains abnormal data Urinalysis With Culture If Indicated - Urine, Clean Catch   Order: [id number]   Status:  Final result   Visible to patient:  No (Not Released)        Specimen Information: Urine, Clean Catch           Component  Ref Range & Units 1d ago   Color, UA  Yellow, Straw Yellow    Appearance, UA  Clear Clear    pH, UA  5.0 - 8.0 6.5    Specific Gravity, UA  1.001 - 1.030 1.013    Glucose, UA  Negative Negative    Ketones, UA  Negative 80 mg/dL (3+) Abnormal     Bilirubin, UA  Negative Negative    Blood, UA  Negative Negative    Protein, UA  Negative Negative    Leuk Esterase, UA  Negative Negative    Nitrite, UA  Negative Negative    Urobilinogen, UA  0.2 - 1.0 E.U./dL 0.2 E.U./dL    Resulting Agency  [facility]       Narrative   Performed by:  [facility]   Urine microscopic not indicated.      Specimen Collected: 10/06/19 15:19   Last Resulted: 10/06/19 15:36               Study Result      EXAMINATION: US GALLBLADDER-     INDICATION: rmq abdominal pain in  pregnancy     TECHNIQUE: Ultrasonographic imaging of the right upper quadrant was  performed utilizing a transabdominal technique.     COMPARISON: NONE     FINDINGS: The visualized portions of the pancreas do not demonstrate  obvious abnormality, although are difficult to evaluate secondary to  duodenal bowel gas artifact. The liver demonstrates homogeneous  echotexture without evidence for hepatic mass or lesion appreciated. No  perihepatic fluid identified. The gallbladder demonstrates no evidence  for echogenic foci to suggest Anastasiia lithiasis. The gallbladder wall is  not thickened. No pericholecystic fluid identified. The common bile duct  measures within normal limits at 0.2 cm. The right kidney measures 11.5  x 4.8 x 5.4 cm. There is a mildly prominent right renal pelvis which may  relate to mild hydronephrosis in the setting of pregnancy. No solid  renal mass or perinephric fluid collection identified.     IMPRESSION:     No evidence of cholelithiasis, gallbladder wall thickening, or  pericholecystic fluid.     Mild prominence of the right renal pelvis/mild hydronephrosis which may  be related to mild hydronephrosis in the setting of pregnancy.      Study Result      CT Abdomen Pelvis W     INDICATION:   24-year-old female with right lower quadrant abdominal pain beginning 11 hours ago. Patient is 34 weeks pregnant.     TECHNIQUE:   CT of the abdomen and pelvis with IV contrast. Coronal and sagittal reconstructions were obtained.  Radiation dose reduction techniques included automated exposure control or exposure modulation based on body size. Count of known CT and cardiac nuc med  studies performed in previous 12 months: 0.      COMPARISON:   None available.     FINDINGS:  Visualized lung bases are unremarkable.     Abdomen:   The liver is within normal limits. The spleen and pancreas are normal. The gallbladder is normal. Both adrenal glands are normal. Mild bilateral hydroureteronephrosis, likely secondary  to gravid uterus. There are some questionable punctate, sand-like  nonobstructing right intrarenal calculi. There is also a 2 mm calcification in the deep right pelvis. It is difficult to determine whether this represents a distal right ureteral stone versus a calcified vascular phlebolith. There is some very minimal  right perinephric inflammatory stranding and very subtle asymmetrically decreased enhancement of the right kidney compared to the left. Abdominal aorta normal in course and caliber. Small bowel is unremarkable without obstruction. Grossly unremarkable  appendix. No pericecal inflammation. The colon is unremarkable. No free fluid or free air.     Pelvis:   The urinary bladder is unremarkable.  Third trimester intrauterine gestation identified. No free pelvic fluid.     No acute osseous abnormality.        IMPRESSION:     1. Grossly unremarkable appendix. No pericecal inflammation. No free fluid in the abdomen or pelvis.  2. Third trimester intrauterine gestation.  3. Mild bilateral hydronephrosis of pregnancy. Questionable punctate, sand-like nonobstructing right intrarenal calculi. There is also a 2 mm calcification in the deep right pelvis. It is difficult to determine whether this represents a distal right  ureteral stone versus a calcified vascular phlebolith. Very minimal right perinephric inflammatory stranding noted. Very subtle asymmetrically decreased enhancement of the right kidney compared to the left. This may also represent physiologic changes  from pregnancy, but correlation to exclude superimposed pyelonephritis recommended.     NOTIFICATION: Results were called by telephone at the time of interpretation on 10/6/2019 9:51 PM to Jaxson Jennings MD who verbally acknowledged these results.        Signer Name: Magdaleno Garza MD   Signed: 10/6/2019 9:58 PM   [email or web address]    [facility]             Discharge Disposition:  To Home    Condition on Discharge:   Stable    Vital Signs  Temp:  [97.9 °F (36.6 °C)-99.7 °F (37.6 °C)] 99 °F (37.2 °C)  Heart Rate:  [] 102  Resp:  [16-20] 16  BP: ()/(45-84) 106/61      Discharge Disposition  Home or Self Care    Discharge Medications     Discharge Medications      Continue These Medications      Instructions Start Date   Prenatal 27-1 27-1 MG tablet tablet   1 tablet, Oral, Daily             Discharge Diet: Regular    Activity at Discharge: No restrictions    Follow-up Appointments  LW on 10/8/19 as scheduled      Test Results Pending at Discharge   Order Current Status    Urine Culture - Urine, Urine, Clean Catch Preliminary result           Quin Ambriz MD  10/07/19  12:49 PM    Time: 20 minutes

## 2019-10-08 LAB — BACTERIA SPEC AEROBE CULT: NORMAL

## 2019-10-08 NOTE — NURSING NOTE
D/C instructions reviewed. Pt VU and has no questions. Ambulated off unit in stable condition accompanied by .

## 2019-10-08 NOTE — H&P
FERDINAND Reyes  Obstetric History and Physical    CC:  Decreased fetal movement    Subjective     Patient is a 24 y.o. female  currently at 34w1d, who presents with decreased fetal movement.  She was just discharged today after being admitted with a kidney stone. She had received several pain medications that she is not used to.  As the day went on, she realized that she had not felt the baby move all day.  She got concerned and came in for this.  As soon as she got on the monitors she instantly felt movement and had a RNST.  Denies contractions, leaking fluid or vaginal bleeding.       The following portions of the patients history were reviewed and updated as appropriate: current medications, allergies, past medical history, past surgical history, past family history, past social history and problem list .       Prenatal Information:   Maternal Prenatal Labs  Blood Type No results found for: ABO   Rh Status No results found for: RH   Antibody Screen No results found for: ABSCRN   Gonnorhea No results found for: GCCX   Chlamydia No results found for: CLAMYDCU   RPR No results found for: RPR   Syphilis Antibody No results found for: SYPHILIS   Rubella No results found for: RUBELLAIGGIN   Hepatitis B Surface Antigen No results found for: HEPBSAG   HIV-1 Antibody No results found for: LABHIV1   Hepatitis C Antibody No results found for: HEPCAB   Rapid Urin Drug Screen No results found for: AMPMETHU, BARBITSCNUR, LABBENZSCN, LABMETHSCN, LABOPIASCN, THCURSCR, COCAINEUR, AMPHETSCREEN, PROPOXSCN, BUPRENORSCNU, METAMPSCNUR, OXYCODONESCN, TRICYCLICSCN   Group B Strep Culture No results found for: GBSANTIGEN           External Prenatal Results     Pregnancy Outside Results - Transcribed From Office Records - See Scanned Records For Details     Test Value Date Time    Hgb 11.4 g/dL 10/07/19 0813    Hct 39.9 % 10/07/19 0813    ABO A  19 1346    Rh Positive  19 1346    Antibody Screen Negative  19 1001     Glucose Fasting GTT       Glucose Tolerance Test 1 hour       Glucose Tolerance Test 3 hour       Gonorrhea (discrete)       Chlamydia (discrete)       RPR Non-Reactive  19 1346    VDRL       Syphilis Antibody       Rubella Negative  19 1346    HBsAg Non-Reactive  19 1346    Herpes Simplex Virus PCR       Herpes Simplex VIrus Culture       HIV Non-Reactive  19 1346    Hep C RNA Quant PCR       Hep C Antibody Non-Reactive  19 1346    AFP       Group B Strep       GBS Susceptibility to Clindamycin       GBS Susceptibility to Erythromycin       Fetal Fibronectin       Genetic Testing, Maternal Blood             Drug Screening     Test Value Date Time    Urine Drug Screen       Amphetamine Screen       Barbiturate Screen       Benzodiazepine Screen       Methadone Screen       Phencyclidine Screen       Opiates Screen       THC Screen       Cocaine Screen       Propoxyphene Screen       Buprenorphine Screen       Methamphetamine Screen       Oxycodone Screen       Tricyclic Antidepressants Screen                     Past OB History:     Obstetric History       T0      L0     SAB0   TAB0   Ectopic0   Molar0   Multiple0   Live Births0       # Outcome Date GA Lbr Dwight/2nd Weight Sex Delivery Anes PTL Lv   1 Current                   Past Medical History: Past Medical History:   Diagnosis Date   • Kidney stone       Past Surgical History No past surgical history on file.   Family History: No family history on file.   Social History:  reports that she has never smoked. She has never used smokeless tobacco.   reports that she does not drink alcohol.   reports that she does not use drugs.        Review of Systems  Denies fever, HA, CP, Shortness of air, muscle weakness,                                             and rashes      Objective     Vital Signs Range for the last 24 hours  Temperature: Temp:  [98.4 °F (36.9 °C)-99 °F (37.2 °C)] 98.4 °F (36.9 °C)   Temp Source: Temp src: Oral    BP: BP: ()/(45-90) 128/90   Pulse: Heart Rate:  [] 116   Respirations: Resp:  [16-19] 16   SPO2: SpO2:  [96 %-98 %] 98 %   O2 Amount (l/min):     O2 Devices     Weight:       Physical Examination: General appearance - alert, well appearing, and in no distress and oriented to person, place, and time  Chest - clear to auscultation, no wheezes, rales or rhonchi, symmetric air entry  Heart - S1 and S2 normal, no murmurs noted  Abdomen - soft, nontender, nondistended, no masses or organomegaly  no rebound tenderness noted  bowel sounds normal  No guarding, No RUQ pain  Extremities - pedal edema - none                          Fetal Heart Rate Assessment   Method: Fetal HR Assessment Method: external   Beats/min: Fetal HR (beats/min): 120   Baseline: Fetal Heart Baseline Rate: normal range   Varibility: Fetal HR Variability: moderate (amplitude range 6 to 25 bpm)   Accels: Fetal HR Accelerations: greater than/equal to 15 bpm, lasting at least 15 seconds   Decels: Fetal HR Decelerations: absent   Tracing Category:       Uterine Assessment   Method: Method: external tocotransducer   Frequency (min):     Ctx Count in 10 min:     Duration:     Intensity: Contraction Intensity: no contractions   Intensity by IUPC:     Resting Tone: Uterine Resting Tone: soft by palpation   Resting Tone by IUPC:     [place] Units:      NST                     Indication:  Decreased fetal movement.  Start time : 22:00                  End time:  22:20  15 x 15 accels x 2  Yes  No decels  Baseline  120s  Reactive NST - Yes             Assessment:  1. Intrauterine pregnancy at 34w1d weeks gestation with reactive fetal status  2.  Decreased fetal movement  Plan:  1. Reactive NST  2. All questions have been answered.  3. D/C home       Jefferson Harrison MD  10/7/2019  10:18 PM

## 2019-10-23 LAB — EXTERNAL GROUP B STREP ANTIGEN: NORMAL

## 2019-11-02 ENCOUNTER — HOSPITAL ENCOUNTER (INPATIENT)
Facility: HOSPITAL | Age: 24
LOS: 3 days | Discharge: HOME | End: 2019-11-05
Attending: ADVANCED PRACTICE MIDWIFE | Admitting: OBSTETRICS & GYNECOLOGY

## 2019-11-02 LAB
ABO GROUP BLD: NORMAL
BLD GP AB SCN SERPL QL: NEGATIVE
DEPRECATED RDW RBC AUTO: 42.7 FL (ref 37–54)
ERYTHROCYTE [DISTWIDTH] IN BLOOD BY AUTOMATED COUNT: 13.4 % (ref 12.3–15.4)
HCT VFR BLD AUTO: 35.9 % (ref 34–46.6)
HGB BLD-MCNC: 11.6 G/DL (ref 12–15.9)
MCH RBC QN AUTO: 28.3 PG (ref 26.6–33)
MCHC RBC AUTO-ENTMCNC: 32.3 G/DL (ref 31.5–35.7)
MCV RBC AUTO: 87.6 FL (ref 79–97)
PLATELET # BLD AUTO: 236 10*3/MM3 (ref 140–450)
PMV BLD AUTO: 11.6 FL (ref 6–12)
RBC # BLD AUTO: 4.1 10*6/MM3 (ref 3.77–5.28)
RH BLD: POSITIVE
T&S EXPIRATION DATE: NORMAL
WBC NRBC COR # BLD: 10.85 10*3/MM3 (ref 3.4–10.8)

## 2019-11-02 PROCEDURE — 25010000002 PENICILLIN G POTASSIUM PER 600000 UNITS: Performed by: NURSE PRACTITIONER

## 2019-11-02 PROCEDURE — 86901 BLOOD TYPING SEROLOGIC RH(D): CPT | Performed by: OBSTETRICS & GYNECOLOGY

## 2019-11-02 PROCEDURE — 86850 RBC ANTIBODY SCREEN: CPT | Performed by: OBSTETRICS & GYNECOLOGY

## 2019-11-02 PROCEDURE — 85027 COMPLETE CBC AUTOMATED: CPT | Performed by: OBSTETRICS & GYNECOLOGY

## 2019-11-02 PROCEDURE — 86900 BLOOD TYPING SEROLOGIC ABO: CPT | Performed by: OBSTETRICS & GYNECOLOGY

## 2019-11-02 PROCEDURE — 25010000002 DIPHENHYDRAMINE PER 50 MG: Performed by: OBSTETRICS & GYNECOLOGY

## 2019-11-02 RX ORDER — FAMOTIDINE 10 MG/ML
20 INJECTION, SOLUTION INTRAVENOUS ONCE
Status: COMPLETED | OUTPATIENT
Start: 2019-11-02 | End: 2019-11-02

## 2019-11-02 RX ORDER — CEFAZOLIN SODIUM 2 G/100ML
2 INJECTION, SOLUTION INTRAVENOUS EVERY 8 HOURS
Status: DISCONTINUED | OUTPATIENT
Start: 2019-11-03 | End: 2019-11-03

## 2019-11-02 RX ORDER — PENICILLIN G 3000000 [IU]/50ML
3 INJECTION, SOLUTION INTRAVENOUS EVERY 4 HOURS
Status: DISCONTINUED | OUTPATIENT
Start: 2019-11-03 | End: 2019-11-02

## 2019-11-02 RX ORDER — DIPHENHYDRAMINE HCL 25 MG
25 CAPSULE ORAL EVERY 4 HOURS PRN
Status: DISCONTINUED | OUTPATIENT
Start: 2019-11-02 | End: 2019-11-03

## 2019-11-02 RX ORDER — SODIUM CHLORIDE 0.9 % (FLUSH) 0.9 %
3-10 SYRINGE (ML) INJECTION AS NEEDED
Status: DISCONTINUED | OUTPATIENT
Start: 2019-11-02 | End: 2019-11-03

## 2019-11-02 RX ORDER — BUTORPHANOL TARTRATE 1 MG/ML
1 INJECTION, SOLUTION INTRAMUSCULAR; INTRAVENOUS
Status: DISCONTINUED | OUTPATIENT
Start: 2019-11-02 | End: 2019-11-03

## 2019-11-02 RX ORDER — SODIUM CHLORIDE 0.9 % (FLUSH) 0.9 %
3 SYRINGE (ML) INJECTION EVERY 12 HOURS SCHEDULED
Status: DISCONTINUED | OUTPATIENT
Start: 2019-11-02 | End: 2019-11-03

## 2019-11-02 RX ORDER — ACETAMINOPHEN 325 MG/1
650 TABLET ORAL EVERY 4 HOURS PRN
Status: DISCONTINUED | OUTPATIENT
Start: 2019-11-02 | End: 2019-11-03

## 2019-11-02 RX ORDER — SODIUM CHLORIDE, SODIUM LACTATE, POTASSIUM CHLORIDE, CALCIUM CHLORIDE 600; 310; 30; 20 MG/100ML; MG/100ML; MG/100ML; MG/100ML
125 INJECTION, SOLUTION INTRAVENOUS CONTINUOUS
Status: DISCONTINUED | OUTPATIENT
Start: 2019-11-02 | End: 2019-11-03

## 2019-11-02 RX ORDER — MAGNESIUM CARB/ALUMINUM HYDROX 105-160MG
30 TABLET,CHEWABLE ORAL ONCE
Status: DISCONTINUED | OUTPATIENT
Start: 2019-11-02 | End: 2019-11-03

## 2019-11-02 RX ORDER — OXYTOCIN-SODIUM CHLORIDE 0.9% IV SOLN 30 UNIT/500ML 30-0.9/5 UT/ML-%
2-30 SOLUTION INTRAVENOUS
Status: DISCONTINUED | OUTPATIENT
Start: 2019-11-02 | End: 2019-11-03

## 2019-11-02 RX ORDER — DIPHENHYDRAMINE HYDROCHLORIDE 50 MG/ML
25 INJECTION INTRAMUSCULAR; INTRAVENOUS ONCE
Status: COMPLETED | OUTPATIENT
Start: 2019-11-02 | End: 2019-11-02

## 2019-11-02 RX ADMIN — OXYTOCIN 2 MILLI-UNITS/MIN: 10 INJECTION, SOLUTION INTRAMUSCULAR; INTRAVENOUS at 21:17

## 2019-11-02 RX ADMIN — FAMOTIDINE 20 MG: 10 INJECTION, SOLUTION INTRAVENOUS at 22:28

## 2019-11-02 RX ADMIN — DIPHENHYDRAMINE HYDROCHLORIDE 25 MG: 50 INJECTION INTRAMUSCULAR; INTRAVENOUS at 22:28

## 2019-11-02 RX ADMIN — SODIUM CHLORIDE, POTASSIUM CHLORIDE, SODIUM LACTATE AND CALCIUM CHLORIDE 125 ML/HR: 600; 310; 30; 20 INJECTION, SOLUTION INTRAVENOUS at 20:50

## 2019-11-02 RX ADMIN — SODIUM CHLORIDE 5 MILLION UNITS: 900 INJECTION INTRAVENOUS at 21:17

## 2019-11-03 ENCOUNTER — ANESTHESIA (OUTPATIENT)
Dept: LABOR AND DELIVERY | Facility: HOSPITAL | Age: 24
End: 2019-11-03

## 2019-11-03 ENCOUNTER — ANESTHESIA EVENT (OUTPATIENT)
Dept: LABOR AND DELIVERY | Facility: HOSPITAL | Age: 24
End: 2019-11-03

## 2019-11-03 PROBLEM — O41.1290 CHORIOAMNIONITIS: Status: ACTIVE | Noted: 2019-11-03

## 2019-11-03 PROBLEM — O26.893 ABDOMINAL PAIN IN PREGNANCY, THIRD TRIMESTER: Status: RESOLVED | Noted: 2019-10-06 | Resolved: 2019-11-03

## 2019-11-03 PROBLEM — O44.20 MARGINAL PLACENTA PREVIA: Status: RESOLVED | Noted: 2019-06-24 | Resolved: 2019-11-03

## 2019-11-03 PROBLEM — R10.9 ABDOMINAL PAIN IN PREGNANCY, THIRD TRIMESTER: Status: RESOLVED | Noted: 2019-10-06 | Resolved: 2019-11-03

## 2019-11-03 PROBLEM — Z34.90 PREGNANCY: Status: RESOLVED | Noted: 2019-06-24 | Resolved: 2019-11-03

## 2019-11-03 LAB
ALBUMIN SERPL-MCNC: 2.9 G/DL (ref 3.5–5.2)
ALBUMIN/GLOB SERPL: 1 G/DL
ALP SERPL-CCNC: 145 U/L (ref 39–117)
ALT SERPL W P-5'-P-CCNC: 19 U/L (ref 1–33)
ANION GAP SERPL CALCULATED.3IONS-SCNC: 14 MMOL/L (ref 5–15)
AST SERPL-CCNC: 20 U/L (ref 1–32)
BACTERIA UR QL AUTO: ABNORMAL /HPF
BASOPHILS # BLD AUTO: 0.03 10*3/MM3 (ref 0–0.2)
BASOPHILS NFR BLD AUTO: 0.1 % (ref 0–1.5)
BILIRUB SERPL-MCNC: 0.4 MG/DL (ref 0.2–1.2)
BILIRUB UR QL STRIP: NEGATIVE
BUN BLD-MCNC: 6 MG/DL (ref 6–20)
BUN/CREAT SERPL: 12 (ref 7–25)
CALCIUM SPEC-SCNC: 8.5 MG/DL (ref 8.6–10.5)
CHLORIDE SERPL-SCNC: 103 MMOL/L (ref 98–107)
CLARITY UR: ABNORMAL
CO2 SERPL-SCNC: 19 MMOL/L (ref 22–29)
COLOR UR: ABNORMAL
CREAT BLD-MCNC: 0.5 MG/DL (ref 0.57–1)
D-LACTATE SERPL-SCNC: 1.6 MMOL/L (ref 0.5–2)
DEPRECATED RDW RBC AUTO: 44.1 FL (ref 37–54)
EOSINOPHIL # BLD AUTO: 0.01 10*3/MM3 (ref 0–0.4)
EOSINOPHIL NFR BLD AUTO: 0 % (ref 0.3–6.2)
ERYTHROCYTE [DISTWIDTH] IN BLOOD BY AUTOMATED COUNT: 13.7 % (ref 12.3–15.4)
GFR SERPL CREATININE-BSD FRML MDRD: >150 ML/MIN/1.73
GLOBULIN UR ELPH-MCNC: 2.9 GM/DL
GLUCOSE BLD-MCNC: 90 MG/DL (ref 65–99)
GLUCOSE UR STRIP-MCNC: NEGATIVE MG/DL
HCT VFR BLD AUTO: 35.9 % (ref 34–46.6)
HGB BLD-MCNC: 11.1 G/DL (ref 12–15.9)
HGB UR QL STRIP.AUTO: ABNORMAL
HYALINE CASTS UR QL AUTO: ABNORMAL /LPF
IMM GRANULOCYTES # BLD AUTO: 0.19 10*3/MM3 (ref 0–0.05)
IMM GRANULOCYTES NFR BLD AUTO: 0.9 % (ref 0–0.5)
KETONES UR QL STRIP: ABNORMAL
LEUKOCYTE ESTERASE UR QL STRIP.AUTO: ABNORMAL
LYMPHOCYTES # BLD AUTO: 0.97 10*3/MM3 (ref 0.7–3.1)
LYMPHOCYTES NFR BLD AUTO: 4.8 % (ref 19.6–45.3)
MCH RBC QN AUTO: 27.3 PG (ref 26.6–33)
MCHC RBC AUTO-ENTMCNC: 30.9 G/DL (ref 31.5–35.7)
MCV RBC AUTO: 88.4 FL (ref 79–97)
MONOCYTES # BLD AUTO: 1.33 10*3/MM3 (ref 0.1–0.9)
MONOCYTES NFR BLD AUTO: 6.5 % (ref 5–12)
NEUTROPHILS # BLD AUTO: 17.86 10*3/MM3 (ref 1.7–7)
NEUTROPHILS NFR BLD AUTO: 87.7 % (ref 42.7–76)
NITRITE UR QL STRIP: NEGATIVE
NRBC BLD AUTO-RTO: 0 /100 WBC (ref 0–0.2)
PH UR STRIP.AUTO: 8 [PH] (ref 5–8)
PLATELET # BLD AUTO: 197 10*3/MM3 (ref 140–450)
PMV BLD AUTO: 12.2 FL (ref 6–12)
POTASSIUM BLD-SCNC: 3.6 MMOL/L (ref 3.5–5.2)
PROT SERPL-MCNC: 5.8 G/DL (ref 6–8.5)
PROT UR QL STRIP: ABNORMAL
RBC # BLD AUTO: 4.06 10*6/MM3 (ref 3.77–5.28)
RBC # UR: ABNORMAL /HPF
REF LAB TEST METHOD: ABNORMAL
SODIUM BLD-SCNC: 136 MMOL/L (ref 136–145)
SP GR UR STRIP: 1.01 (ref 1–1.03)
SQUAMOUS #/AREA URNS HPF: ABNORMAL /HPF
UROBILINOGEN UR QL STRIP: ABNORMAL
WBC NRBC COR # BLD: 20.39 10*3/MM3 (ref 3.4–10.8)
WBC UR QL AUTO: ABNORMAL /HPF

## 2019-11-03 PROCEDURE — 25010000002 BUTORPHANOL PER 1 MG: Performed by: NURSE PRACTITIONER

## 2019-11-03 PROCEDURE — 85025 COMPLETE CBC W/AUTO DIFF WBC: CPT | Performed by: NURSE PRACTITIONER

## 2019-11-03 PROCEDURE — 0UQMXZZ REPAIR VULVA, EXTERNAL APPROACH: ICD-10-PCS | Performed by: NURSE PRACTITIONER

## 2019-11-03 PROCEDURE — 87086 URINE CULTURE/COLONY COUNT: CPT | Performed by: NURSE PRACTITIONER

## 2019-11-03 PROCEDURE — 87040 BLOOD CULTURE FOR BACTERIA: CPT | Performed by: NURSE PRACTITIONER

## 2019-11-03 PROCEDURE — 25010000002 GENTAMICIN PER 80 MG

## 2019-11-03 PROCEDURE — C1755 CATHETER, INTRASPINAL: HCPCS

## 2019-11-03 PROCEDURE — 80053 COMPREHEN METABOLIC PANEL: CPT | Performed by: NURSE PRACTITIONER

## 2019-11-03 PROCEDURE — 83605 ASSAY OF LACTIC ACID: CPT | Performed by: NURSE PRACTITIONER

## 2019-11-03 PROCEDURE — 59025 FETAL NON-STRESS TEST: CPT

## 2019-11-03 PROCEDURE — 51703 INSERT BLADDER CATH COMPLEX: CPT

## 2019-11-03 PROCEDURE — 25010000002 FENTANYL CITRATE (PF) 100 MCG/2ML SOLUTION: Performed by: ANESTHESIOLOGY

## 2019-11-03 PROCEDURE — C1755 CATHETER, INTRASPINAL: HCPCS | Performed by: ANESTHESIOLOGY

## 2019-11-03 PROCEDURE — 25010000002 ROPIVACAINE PER 1 MG: Performed by: ANESTHESIOLOGY

## 2019-11-03 PROCEDURE — 25010000003 CEFAZOLIN IN DEXTROSE 2-4 GM/100ML-% SOLUTION: Performed by: OBSTETRICS & GYNECOLOGY

## 2019-11-03 PROCEDURE — 81001 URINALYSIS AUTO W/SCOPE: CPT | Performed by: NURSE PRACTITIONER

## 2019-11-03 RX ORDER — METHYLERGONOVINE MALEATE 0.2 MG/ML
200 INJECTION INTRAVENOUS ONCE AS NEEDED
Status: DISCONTINUED | OUTPATIENT
Start: 2019-11-03 | End: 2019-11-03

## 2019-11-03 RX ORDER — IBUPROFEN 600 MG/1
600 TABLET ORAL EVERY 6 HOURS PRN
Status: DISCONTINUED | OUTPATIENT
Start: 2019-11-03 | End: 2019-11-03

## 2019-11-03 RX ORDER — ACETAMINOPHEN 325 MG/1
650 TABLET ORAL EVERY 4 HOURS PRN
Status: DISCONTINUED | OUTPATIENT
Start: 2019-11-03 | End: 2019-11-03 | Stop reason: SDUPTHER

## 2019-11-03 RX ORDER — BISACODYL 10 MG
10 SUPPOSITORY, RECTAL RECTAL DAILY PRN
Status: DISCONTINUED | OUTPATIENT
Start: 2019-11-04 | End: 2019-11-05 | Stop reason: HOSPADM

## 2019-11-03 RX ORDER — FENTANYL CITRATE 50 UG/ML
INJECTION, SOLUTION INTRAMUSCULAR; INTRAVENOUS AS NEEDED
Status: DISCONTINUED | OUTPATIENT
Start: 2019-11-03 | End: 2019-11-03 | Stop reason: SURG

## 2019-11-03 RX ORDER — OXYCODONE AND ACETAMINOPHEN 10; 325 MG/1; MG/1
2 TABLET ORAL EVERY 4 HOURS PRN
Status: DISCONTINUED | OUTPATIENT
Start: 2019-11-03 | End: 2019-11-03

## 2019-11-03 RX ORDER — ACETAMINOPHEN 325 MG/1
650 TABLET ORAL EVERY 4 HOURS PRN
Status: DISCONTINUED | OUTPATIENT
Start: 2019-11-03 | End: 2019-11-05 | Stop reason: HOSPADM

## 2019-11-03 RX ORDER — FAMOTIDINE 10 MG/ML
20 INJECTION, SOLUTION INTRAVENOUS ONCE AS NEEDED
Status: DISCONTINUED | OUTPATIENT
Start: 2019-11-03 | End: 2019-11-03

## 2019-11-03 RX ORDER — OXYTOCIN-SODIUM CHLORIDE 0.9% IV SOLN 30 UNIT/500ML 30-0.9/5 UT/ML-%
650 SOLUTION INTRAVENOUS ONCE
Status: COMPLETED | OUTPATIENT
Start: 2019-11-03 | End: 2019-11-03

## 2019-11-03 RX ORDER — ONDANSETRON 2 MG/ML
4 INJECTION INTRAMUSCULAR; INTRAVENOUS ONCE AS NEEDED
Status: DISCONTINUED | OUTPATIENT
Start: 2019-11-03 | End: 2019-11-03

## 2019-11-03 RX ORDER — ONDANSETRON 4 MG/1
4 TABLET, FILM COATED ORAL EVERY 8 HOURS PRN
Status: DISCONTINUED | OUTPATIENT
Start: 2019-11-03 | End: 2019-11-05 | Stop reason: HOSPADM

## 2019-11-03 RX ORDER — ROPIVACAINE HYDROCHLORIDE 5 MG/ML
INJECTION, SOLUTION EPIDURAL; INFILTRATION; PERINEURAL AS NEEDED
Status: DISCONTINUED | OUTPATIENT
Start: 2019-11-03 | End: 2019-11-03 | Stop reason: SURG

## 2019-11-03 RX ORDER — OXYTOCIN-SODIUM CHLORIDE 0.9% IV SOLN 30 UNIT/500ML 30-0.9/5 UT/ML-%
85 SOLUTION INTRAVENOUS ONCE
Status: COMPLETED | OUTPATIENT
Start: 2019-11-03 | End: 2019-11-03

## 2019-11-03 RX ORDER — LIDOCAINE HYDROCHLORIDE AND EPINEPHRINE 15; 5 MG/ML; UG/ML
INJECTION, SOLUTION EPIDURAL AS NEEDED
Status: DISCONTINUED | OUTPATIENT
Start: 2019-11-03 | End: 2019-11-03 | Stop reason: SURG

## 2019-11-03 RX ORDER — CLINDAMYCIN PHOSPHATE 900 MG/50ML
900 INJECTION INTRAVENOUS EVERY 8 HOURS
Status: COMPLETED | OUTPATIENT
Start: 2019-11-03 | End: 2019-11-04

## 2019-11-03 RX ORDER — METOCLOPRAMIDE HYDROCHLORIDE 5 MG/ML
10 INJECTION INTRAMUSCULAR; INTRAVENOUS ONCE AS NEEDED
Status: DISCONTINUED | OUTPATIENT
Start: 2019-11-03 | End: 2019-11-03

## 2019-11-03 RX ORDER — DIPHENHYDRAMINE HYDROCHLORIDE 50 MG/ML
12.5 INJECTION INTRAMUSCULAR; INTRAVENOUS EVERY 8 HOURS PRN
Status: DISCONTINUED | OUTPATIENT
Start: 2019-11-03 | End: 2019-11-03

## 2019-11-03 RX ORDER — ROPIVACAINE HYDROCHLORIDE 2 MG/ML
15 INJECTION, SOLUTION EPIDURAL; INFILTRATION; PERINEURAL CONTINUOUS
Status: DISCONTINUED | OUTPATIENT
Start: 2019-11-03 | End: 2019-11-03

## 2019-11-03 RX ORDER — SODIUM CHLORIDE 0.9 % (FLUSH) 0.9 %
1-10 SYRINGE (ML) INJECTION AS NEEDED
Status: DISCONTINUED | OUTPATIENT
Start: 2019-11-03 | End: 2019-11-05 | Stop reason: HOSPADM

## 2019-11-03 RX ORDER — ACETAMINOPHEN 500 MG
1000 TABLET ORAL ONCE
Status: COMPLETED | OUTPATIENT
Start: 2019-11-03 | End: 2019-11-03

## 2019-11-03 RX ORDER — MISOPROSTOL 200 UG/1
800 TABLET ORAL AS NEEDED
Status: DISCONTINUED | OUTPATIENT
Start: 2019-11-03 | End: 2019-11-03

## 2019-11-03 RX ORDER — DOCUSATE SODIUM 100 MG/1
100 CAPSULE, LIQUID FILLED ORAL 2 TIMES DAILY PRN
Status: DISCONTINUED | OUTPATIENT
Start: 2019-11-03 | End: 2019-11-05 | Stop reason: HOSPADM

## 2019-11-03 RX ORDER — EPHEDRINE SULFATE/0.9% NACL/PF 25 MG/5 ML
5 SYRINGE (ML) INTRAVENOUS
Status: DISCONTINUED | OUTPATIENT
Start: 2019-11-03 | End: 2019-11-03

## 2019-11-03 RX ORDER — TRISODIUM CITRATE DIHYDRATE AND CITRIC ACID MONOHYDRATE 500; 334 MG/5ML; MG/5ML
30 SOLUTION ORAL ONCE
Status: DISCONTINUED | OUTPATIENT
Start: 2019-11-03 | End: 2019-11-03

## 2019-11-03 RX ORDER — IBUPROFEN 600 MG/1
600 TABLET ORAL EVERY 6 HOURS PRN
Status: DISCONTINUED | OUTPATIENT
Start: 2019-11-03 | End: 2019-11-05 | Stop reason: HOSPADM

## 2019-11-03 RX ORDER — CARBOPROST TROMETHAMINE 250 UG/ML
250 INJECTION, SOLUTION INTRAMUSCULAR AS NEEDED
Status: DISCONTINUED | OUTPATIENT
Start: 2019-11-03 | End: 2019-11-03

## 2019-11-03 RX ORDER — PRENATAL VIT/IRON FUM/FOLIC AC 27MG-0.8MG
1 TABLET ORAL DAILY
Status: DISCONTINUED | OUTPATIENT
Start: 2019-11-03 | End: 2019-11-05 | Stop reason: HOSPADM

## 2019-11-03 RX ADMIN — OXYTOCIN 650 ML/HR: 10 INJECTION INTRAVENOUS at 14:34

## 2019-11-03 RX ADMIN — CEFAZOLIN SODIUM 2 G: 2 INJECTION, SOLUTION INTRAVENOUS at 10:19

## 2019-11-03 RX ADMIN — ACETAMINOPHEN 1000 MG: 500 TABLET ORAL at 11:44

## 2019-11-03 RX ADMIN — PRENATAL VITAMINS-IRON FUMARATE 27 MG IRON-FOLIC ACID 0.8 MG TABLET 1 TABLET: at 17:11

## 2019-11-03 RX ADMIN — Medication 5 MG: at 04:04

## 2019-11-03 RX ADMIN — DOCUSATE SODIUM 100 MG: 100 CAPSULE, LIQUID FILLED ORAL at 17:11

## 2019-11-03 RX ADMIN — BUTORPHANOL TARTRATE 1 MG: 1 INJECTION, SOLUTION INTRAMUSCULAR; INTRAVENOUS at 01:42

## 2019-11-03 RX ADMIN — CEFAZOLIN SODIUM 2 G: 2 INJECTION, SOLUTION INTRAVENOUS at 01:24

## 2019-11-03 RX ADMIN — ACETAMINOPHEN 650 MG: 325 TABLET ORAL at 17:11

## 2019-11-03 RX ADMIN — LIDOCAINE HYDROCHLORIDE AND EPINEPHRINE 3 ML: 15; 5 INJECTION, SOLUTION EPIDURAL at 02:39

## 2019-11-03 RX ADMIN — CLINDAMYCIN PHOSPHATE 900 MG: 900 INJECTION, SOLUTION INTRAVENOUS at 21:50

## 2019-11-03 RX ADMIN — WITCH HAZEL 1 PAD: 500 SOLUTION RECTAL; TOPICAL at 17:11

## 2019-11-03 RX ADMIN — GENTAMICIN SULFATE 300 MG: 40 INJECTION, SOLUTION INTRAMUSCULAR; INTRAVENOUS at 13:05

## 2019-11-03 RX ADMIN — FENTANYL CITRATE 100 MCG: 50 INJECTION, SOLUTION INTRAMUSCULAR; INTRAVENOUS at 02:39

## 2019-11-03 RX ADMIN — Medication 5 MG: at 04:44

## 2019-11-03 RX ADMIN — ROPIVACAINE HYDROCHLORIDE 14 ML/HR: 2 INJECTION, SOLUTION EPIDURAL; INFILTRATION at 02:39

## 2019-11-03 RX ADMIN — CLINDAMYCIN PHOSPHATE 900 MG: 900 INJECTION, SOLUTION INTRAVENOUS at 12:07

## 2019-11-03 RX ADMIN — ROPIVACAINE HYDROCHLORIDE 8 ML: 5 INJECTION, SOLUTION EPIDURAL; INFILTRATION; PERINEURAL at 02:39

## 2019-11-03 RX ADMIN — OXYTOCIN 85 ML/HR: 10 INJECTION, SOLUTION INTRAMUSCULAR; INTRAVENOUS at 15:08

## 2019-11-03 RX ADMIN — ACETAMINOPHEN 650 MG: 325 TABLET ORAL at 04:54

## 2019-11-03 NOTE — PLAN OF CARE
Problem: Patient Care Overview  Goal: Plan of Care Review  Outcome: Ongoing (interventions implemented as appropriate)   11/03/19 1648   Coping/Psychosocial   Plan of Care Reviewed With patient;spouse   Plan of Care Review   Progress improving     Goal: Individualization and Mutuality  Outcome: Ongoing (interventions implemented as appropriate)    Goal: Discharge Needs Assessment  Outcome: Ongoing (interventions implemented as appropriate)   11/03/19 1648   Discharge Needs Assessment   Concerns to be Addressed no discharge needs identified;denies needs/concerns at this time       Problem: Labor (Cervical Ripen, Induct, Augment) (Adult,Obstetrics,Pediatric)  Goal: Signs and Symptoms of Listed Potential Problems Will be Absent, Minimized or Managed (Labor)  Outcome: Outcome(s) achieved Date Met: 11/03/19 11/03/19 1648   Goal/Outcome Evaluation   Problems Assessed (Labor) all   Problems Present (Labor) none

## 2019-11-03 NOTE — PROGRESS NOTES
Reports headache, bp 104/57  Ephedrine given, will give tylenol  iupc placed   sve 5-6/80/-1  fhr 14os with early vs late decelerations noted, monitor closely  Clear fluid continues to leak  Continue with current plan of care  Increase pitocin for adequate MVUs

## 2019-11-03 NOTE — L&D DELIVERY NOTE
[facility]  Vaginal Delivery Note    Delivery     Delivery: Vaginal, Spontaneous     YOB: 2017    Time of Birth:  Gestational Age 2:27 PM   38w0d     Anesthesia: Epidural     Delivering clinician: Marie Obregon    Forceps?   No   Vacuum? No    Shoulder dystocia present: No        Delivery narrative:  Erin pushed effectively to deliver a live born female infant over an intact perineum with a labor epidural. Upon delivery of infant's head a tight nuchal was encountered and delivered through, compound left hand noted. Infant's shoulders and body delivered easily over one contraction. Infant vigorous at delivery and placed on maternal abdomen where cord was double clamped and cut by FOB. Placenta delivered spontaneously and was visually intact. Fundus firm, lower uterine segment boggy, clots removed and lower uterine segment firm. Bilateral labial lacerations repaired. Patient tolerated procedure well.     Infant    Findings: female  infant     Infant observations: Weight: 3110 g (6 lb 13.7 oz)   Length: 20  in  Observations/Comments:         Apgars:    @ 1 minute / not  Entered in EMR at time of note       @ 5 minutes   Infant Name: Rakesh Fields     Placenta, Cord, and Fluid    Placenta delivered     spontaneously      Cord:    3 vessels present.   Nuchal Cord?  yes; Number of nuchal loops present:  1    Cord blood obtained:      Cord gases obtained:       Cord gas results: Venous:  No results found for: PHCVEN    Arterial:  No results found for: PHCART     Repair    Episiotomy: none      Lacerations: No   Estimated Blood Loss:  100ml           Complications  Chorioamnionitis   Disposition  Mother to Mother Baby/Postpartum  in stable condition currently.  Baby to remains with mom  in stable condition currently.      Marie Obregon CNM  11/03/19  2:49 PM

## 2019-11-03 NOTE — ANESTHESIA PROCEDURE NOTES
Labor Epidural      Patient location during procedure: OB  Start Time: 11/3/2019 2:29 AM  Stop Time: 11/3/2019 2:49 AM  Performed By  Anesthesiologist: Prince Crawford MD  Preanesthetic Checklist  Completed: patient identified, site marked, surgical consent, pre-op evaluation, timeout performed, risks and benefits discussed and monitors and equipment checked  Prep:  Pt Position:sitting  Sterile Tech:cap, gloves, mask and sterile barrier  Prep:alcohol swabs  Monitoring:blood pressure monitoring  Epidural Block Procedure:  Approach:midline  Guidance:landmark technique  Location:L3-L4  Needle Type:Tuohy  Needle Gauge:17 G  Loss of Resistance Medium: air  Loss of Resistance: 5cm  Cath Depth at skin:15 cm  Paresthesia: none  Aspiration:negative  Test Dose:negative  Number of Attempts: 1  Post Assessment:  Dressing:occlusive dressing applied and secured with tape  Pt Tolerance:patient tolerated the procedure well with no apparent complications  Complications:no

## 2019-11-03 NOTE — PROGRESS NOTES
Erin Mckeon  [id number]  1995      CTSP for swelling under eyes.  Pt s/p dose of IV PCN (no known allergy to PCN).  Pt denies SOB, itching, or rash  Exam: bilateral periorbital edema present  A/1)poss allergic reaction to PCN  P/1)stop PCN, switch to ancef     2)benadryl 25mg po     3)pepcid 20mg IV     4)close obs    Jaxson Jennings MD  11/2/2019  10:24 PM

## 2019-11-03 NOTE — PROGRESS NOTES
Reports occasional pressure with contractions  Chorioamnionitis, septic protocol initiated due to positive screen  Continue current plan of care

## 2019-11-03 NOTE — H&P
Amy  Obstetric History and Physical    Chief Complaint   Patient presents with   • Rupture of Membranes       Subjective     Patient is a 24 y.o. female  currently at 37w6d, who presents with reports of SROM at 1800, clear fluid. Denies any vaginal bleeding. Reports good fetal movement. She is not feeling uterine contractions.     Her prenatal care is complicated by abnormal genetic screening, no results for monosomy X. Had screening ultrasound at [facility] with follow up growth ultrasound at 32 weeks.  Her previous obstetric/gynecological history  is non-contributory.    The following portions of the patients history were reviewed and updated as appropriate: current medications, allergies, past medical history, past surgical history, past family history, past social history and problem list .       Prenatal Information:  Prenatal Results     Initial Prenatal Labs     Test Value Reference Range Date Time    Hemoglobin 13.6 g/dL 12.0 - 15.9 g/dL 19 134    Hematocrit 41.8 % 34.0 - 46.6 % 19    Platelets 236 10*3/mm3 140 - 450 10*3/mm3 19    Rubella IgG Negative   19 1346    Hepatitis B SAg Non-Reactive  Non-Reactive 19 1346    Hepatitis C Ab Non-Reactive  Non-Reactive 19 1346    RPR Non-Reactive  Non-Reactive 19 1346    ABO A   19    Rh Positive   19    Antibody Screen Negative   19 134    HIV Non-Reactive  Non-Reactive 19 1346    Urine Culture 25,000 CFU/mL Mixed Annmarie Isolated   10/06/19 1519    Gonorrhea        Chlamydia        TSH              2nd and 3rd Trimester     Test Value Reference Range Date Time    Hemoglobin (repeated) 11.6 g/dL 12.0 - 15.9 g/dL 19    Hematocrit (repeated) 35.9 % 34.0 - 46.6 % 19    GCT 90 mg/dL mg/dL 19 1001    Antibody Screen (repeated) Negative   19    GTT Fasting        GTT 1 Hr        GTT 2 Hr        GTT 3 Hr        Group B Strep POS   10/23/19            Drug Screening     Test Value Reference Range Date Time    Amphetamine Screen        Barbiturate Screen        Benzodiazepine Screen        Methadone Screen        Phencyclidine Screen        Opiates Screen        THC Screen        Cocaine Screen        Propoxyphene Screen        Buprenorphine Screen        Methamphetamine Screen        Oxycodone Screen        Tricyclic Antidepressants Screen              Other (Risk screening)     Test Value Reference Range Date Time    Varicella IgG        Parvovirus IgG        CMV IgG        Cystic Fibrosis        Hemoglobin electrophoresis        NIPT        MSAFP-4        AFP (for NTD only)                  External Prenatal Results     Pregnancy Outside Results - Transcribed From Office Records - See Scanned Records For Details     Test Value Date Time    Hgb 11.6 g/dL 11/02/19 2057    Hct 35.9 % 11/02/19 2057    ABO A  11/02/19 2057    Rh Positive  11/02/19 2057    Antibody Screen Negative  11/02/19 2057    Glucose Fasting GTT       Glucose Tolerance Test 1 hour       Glucose Tolerance Test 3 hour       Gonorrhea (discrete)       Chlamydia (discrete)       RPR Non-Reactive  05/07/19 1346    VDRL       Syphilis Antibody       Rubella Negative  05/07/19 1346    HBsAg Non-Reactive  05/07/19 1346    Herpes Simplex Virus PCR       Herpes Simplex VIrus Culture       HIV Non-Reactive  05/07/19 1346    Hep C RNA Quant PCR       Hep C Antibody Non-Reactive  05/07/19 1346    AFP       Group B Strep POS  10/23/19     GBS Susceptibility to Clindamycin       GBS Susceptibility to Erythromycin       Fetal Fibronectin       Genetic Testing, Maternal Blood             Drug Screening     Test Value Date Time    Urine Drug Screen       Amphetamine Screen       Barbiturate Screen       Benzodiazepine Screen       Methadone Screen       Phencyclidine Screen       Opiates Screen       THC Screen       Cocaine Screen       Propoxyphene Screen       Buprenorphine Screen        Methamphetamine Screen       Oxycodone Screen       Tricyclic Antidepressants Screen                    Past OB History:     Obstetric History       T0      L0     SAB0   TAB0   Ectopic0   Molar0   Multiple0   Live Births0       # Outcome Date GA Lbr Dwight/2nd Weight Sex Delivery Anes PTL Lv   1 Current                   Past Medical History: Past Medical History:   Diagnosis Date   • Kidney stone       Past Surgical History No past surgical history on file.   Family History: No family history on file.   Social History:  reports that she has never smoked. She has never used smokeless tobacco.   reports that she does not drink alcohol.   reports that she does not use drugs.        Review of Systems   All other systems reviewed and are negative.        Objective     Vital Signs Range for the last 24 hours  Temperature: Temp:  [98.7 °F (37.1 °C)] 98.7 °F (37.1 °C)   Temp Source: Temp src: Oral   BP: BP: (125)/(81) 125/81   Pulse: Heart Rate:  [100] 100   Respirations: Resp:  [16] 16   SPO2:     O2 Amount (l/min):     O2 Devices     Weight:       Physical Examination: General appearance - alert, well appearing, and in no distress  Chest - clear to auscultation, no wheezes, rales or rhonchi, symmetric air entry  Heart - normal rate, regular rhythm, normal S1, S2, no murmurs, rubs, clicks or gallops  Neurological - alert, oriented, normal speech, no focal findings or movement disorder noted  Musculoskeletal - no joint tenderness, deformity or swelling  Extremities - peripheral pulses normal, no pedal edema, no clubbing or cyanosis  Skin - normal coloration and turgor, no rashes, no suspicious skin lesions noted    Presentation: vertex   Cervix: Exam by:  RN   Dilation: Cervical Dilation (cm): 3   Effacement: Cervical Effacement: 60%   Station: Fetal Station: 2-->-1     Fetal Heart Rate Assessment   Method: Fetal HR Assessment Method: external   Beats/min: Fetal HR (beats/min): 140   Baseline: Fetal Heart  Baseline Rate: normal range   Variability: Fetal HR Variability: moderate (amplitude range 6 to 25 bpm)   Accels: Fetal HR Accelerations: greater than/equal to 15 bpm, lasting at least 15 seconds   Decels: Fetal HR Decelerations: variable   Tracing Category:       Uterine Assessment   Method: Method: external tocotransducer   Frequency (min): Contraction Frequency (Minutes): 2-6   Ctx Count in 10 min: Contractions in 10 Minutes: 1   Duration:     Intensity: Contraction Intensity: no contractions   Intensity by IUPC:     Resting Tone: Uterine Resting Tone: soft by palpation   Resting Tone by IUPC:     [place] Units:           Assessment/Plan   Rupture of membranes  37 weeks gestation pregnancy      Assessment & Plan    Assessment:  1.  Intrauterine pregnancy at 37w6d gestation with reactive, reassuring fetal status.    2.  labor  with ROM, PROM  3.  Obstetrical history is non-contributory.  4.  GBS status:   External Strep Group B Ag   Date Value Ref Range Status   10/23/2019 POS  Final       Plan:  1. fetal and uterine monitoring  continuously, labor augmentation  Pitocin, analgesia with  parenteral narcotics and epidural and antibiotic for GBS  2. Plan of care has been reviewed with patient and family  3.  Risks, benefits of treatment plan have been discussed.  4.  All questions have been answered.        Marie Obregon CNM  11/2/2019  10:13 PM

## 2019-11-03 NOTE — PROGRESS NOTES
fhr 145 with early decelerations noted  sve by rn 20 minutes ago unchanged from previous exam  Pitocin at 8 mu/min, contractions not adequate per MVUs with IUPC  Continue current plan of care, titrate pitocin for adequate MVUs as fetal tracing allows  Repeat sve in 2-4 hours

## 2019-11-03 NOTE — PROGRESS NOTES
[facility]  Obstetric Progress Note    Subjective     Patient:    Resting without complaints, comfortable with epidural. Eye swelling completely resolved    Objective     Vital Signs Range for the last 24 hours  Temp:  [98.2 °F (36.8 °C)-98.7 °F (37.1 °C)] 98.2 °F (36.8 °C)   Temp src: Oral   BP: ()/(52-81) 98/52   Heart Rate:  [] 92   Resp:  [16] 16               Weight:  [81.6 kg (180 lb)] 81.6 kg (180 lb)       Intake/Output this shift:    No intake/output data recorded.    Physical Exam:      Abdomen Abdominal exam: soft, nontender, nondistended, no masses or organomegaly.   Extremities Exam of extremities: peripheral pulses normal, no pedal edema, no clubbing or cyanosis     Presentation: vertex   Cervix: Exam by: Method: sterile exam per RN   Dilation:  5   Effacement: Cervical Effacement: 80%   Station:  -1         Fetal Heart Rate Assessment   Method: Fetal HR Assessment Method: external   Beats/min: Fetal HR (beats/min): 140   Baseline: Fetal Heart Baseline Rate: normal range   Varibility: Fetal HR Variability: moderate (amplitude range 6 to 25 bpm)   Accels: Fetal HR Accelerations: absent   Decels: Fetal HR Decelerations: early   Tracing Category:       Uterine Assessment   Method: Method: external tocotransducer   Frequency (min): Contraction Frequency (Minutes): 2-3   Ctx Count in 10 min:     Duration:     Intensity: Contraction Intensity: moderate by palpation   Intensity by IUPC:     Resting Tone: Uterine Resting Tone: soft by palpation   Resting Tone by IUPC:     [place] Units:         Assessment/Plan       Pregnancy        Assessment:  1.  Intrauterine pregnancy at 38w0d weeks gestation with reassuring fetal status.    2.  labor  with ROM  3.  GBS status:positive  Plan:  1. Continue current plan of care  2.  Repeat SVE every 2-4 hours or prn  3.   Plan of care has been reviewed with patient and family  4.  Risks, benefits of treatment plan have been discussed.  5.  All questions have  been answered.        Marie Obregon CNM  11/3/2019  4:13 AM

## 2019-11-04 LAB
BACTERIA SPEC AEROBE CULT: NO GROWTH
BASOPHILS # BLD AUTO: 0.05 10*3/MM3 (ref 0–0.2)
BASOPHILS NFR BLD AUTO: 0.3 % (ref 0–1.5)
DEPRECATED RDW RBC AUTO: 44.4 FL (ref 37–54)
EOSINOPHIL # BLD AUTO: 0.26 10*3/MM3 (ref 0–0.4)
EOSINOPHIL NFR BLD AUTO: 1.4 % (ref 0.3–6.2)
ERYTHROCYTE [DISTWIDTH] IN BLOOD BY AUTOMATED COUNT: 13.7 % (ref 12.3–15.4)
HCT VFR BLD AUTO: 31.8 % (ref 34–46.6)
HGB BLD-MCNC: 9.9 G/DL (ref 12–15.9)
IMM GRANULOCYTES # BLD AUTO: 0.17 10*3/MM3 (ref 0–0.05)
IMM GRANULOCYTES NFR BLD AUTO: 0.9 % (ref 0–0.5)
LYMPHOCYTES # BLD AUTO: 2.3 10*3/MM3 (ref 0.7–3.1)
LYMPHOCYTES NFR BLD AUTO: 12.5 % (ref 19.6–45.3)
MCH RBC QN AUTO: 27.6 PG (ref 26.6–33)
MCHC RBC AUTO-ENTMCNC: 31.1 G/DL (ref 31.5–35.7)
MCV RBC AUTO: 88.6 FL (ref 79–97)
MONOCYTES # BLD AUTO: 0.95 10*3/MM3 (ref 0.1–0.9)
MONOCYTES NFR BLD AUTO: 5.2 % (ref 5–12)
NEUTROPHILS # BLD AUTO: 14.71 10*3/MM3 (ref 1.7–7)
NEUTROPHILS NFR BLD AUTO: 79.7 % (ref 42.7–76)
NRBC BLD AUTO-RTO: 0 /100 WBC (ref 0–0.2)
PLATELET # BLD AUTO: 191 10*3/MM3 (ref 140–450)
PMV BLD AUTO: 12 FL (ref 6–12)
RBC # BLD AUTO: 3.59 10*6/MM3 (ref 3.77–5.28)
WBC NRBC COR # BLD: 18.44 10*3/MM3 (ref 3.4–10.8)

## 2019-11-04 PROCEDURE — 85025 COMPLETE CBC W/AUTO DIFF WBC: CPT | Performed by: NURSE PRACTITIONER

## 2019-11-04 PROCEDURE — 25010000002 GENTAMICIN PER 80 MG: Performed by: NURSE PRACTITIONER

## 2019-11-04 RX ORDER — LANOLIN
CREAM (ML) TOPICAL AS NEEDED
Status: DISCONTINUED | OUTPATIENT
Start: 2019-11-04 | End: 2019-11-05 | Stop reason: HOSPADM

## 2019-11-04 RX ADMIN — CLINDAMYCIN PHOSPHATE 900 MG: 900 INJECTION, SOLUTION INTRAVENOUS at 05:59

## 2019-11-04 RX ADMIN — PRENATAL VITAMINS-IRON FUMARATE 27 MG IRON-FOLIC ACID 0.8 MG TABLET 1 TABLET: at 08:32

## 2019-11-04 RX ADMIN — IBUPROFEN 600 MG: 600 TABLET ORAL at 02:45

## 2019-11-04 RX ADMIN — DOCUSATE SODIUM 100 MG: 100 CAPSULE, LIQUID FILLED ORAL at 08:32

## 2019-11-04 RX ADMIN — GENTAMICIN SULFATE 300 MG: 40 INJECTION, SOLUTION INTRAMUSCULAR; INTRAVENOUS at 13:30

## 2019-11-04 RX ADMIN — Medication: at 17:00

## 2019-11-04 NOTE — PROGRESS NOTES
11/4/2019  PPD #1    Subjective   Erin feels well.  Patient describes her lochia as less than menses.  Pain is well controlled  She is breastfeeding.     Objective   Temp: Temp:  [97.6 °F (36.4 °C)-101.9 °F (38.8 °C)] 97.6 °F (36.4 °C) Temp src: Oral   BP: BP: ()/(55-75) 110/59        Pulse: Heart Rate:  [] 77  RR: Resp:  [16-18] 18    General:  No acute distress   Abdomen: Fundus firm and beneath umbilicus   Pelvis: deferred     Lab Results   Component Value Date    WBC 20.39 (H) 11/03/2019    HGB 11.1 (L) 11/03/2019    HCT 35.9 11/03/2019    MCV 88.4 11/03/2019     11/03/2019    URICACID 4.0 10/06/2019    AST 20 11/03/2019    ALT 19 11/03/2019     10/06/2019    HEPBSAG Non-Reactive 05/07/2019     Results from last 7 days   Lab Units 11/02/19 2057   ABO TYPING  A   RH TYPING  Positive   ANTIBODY SCREEN  Negative       Assessment  1. PPD# 1 after vaginal delivery  2.   Chorioamnionitis  Plan  1. Supportive care, anticipate d/c in am.  2.   Continue antibiotics today. CBC drawn this am    This note has been electronically signed.    Víctor Godoy CNM  8:49 AM  November 4, 2019

## 2019-11-04 NOTE — LACTATION NOTE
11/03/19 1735   Maternal Information   Date of Referral 11/03/19   Person Making Referral (courtesy consult)   Equipment Type   Breast Pump Type double electric, personal   Breast Pump Flange Type hard   Breast Pump Flange Size 24 mm   Reproductive Interventions   Breastfeeding Assistance feeding cue recognition promoted;feeding on demand promoted;support offered   Breastfeeding Support diary/feeding log utilized;encouragement provided;lactation counseling provided   Breast Pumping   Breast Pumping Interventions post-feed pumping encouraged   Coping/Psychosocial Interventions   Parent/Child Attachment Promotion caring behavior modeled;cue recognition promoted;face-to-face positioning promoted;positive reinforcement provided;skin-to-skin contact encouraged;strengths emphasized   Supportive Measures active listening utilized   Teaching done as documented under Education. To call for lactation services, if there are questions or concerns.

## 2019-11-04 NOTE — PLAN OF CARE
Problem: Patient Care Overview  Goal: Plan of Care Review  Outcome: Ongoing (interventions implemented as appropriate)   11/04/19 0114   Coping/Psychosocial   Plan of Care Reviewed With patient   Plan of Care Review   Progress improving   OTHER   Outcome Summary VSS, pt on antibiotics and tolerating well

## 2019-11-04 NOTE — LACTATION NOTE
11/03/19 2200   Maternal Information   Date of Referral 11/03/19   Person Making Referral (fu consult)   Maternal Assessment   Breast Size Issue none   Breast Shape Bilateral:;round   Breast Density Bilateral:;soft   Nipples Bilateral:;short   Maternal Infant Feeding   Maternal Emotional State assist needed;tense   Infant Positioning clutch/football   Signs of Milk Transfer infant jaw motion present   Pain with Feeding no   Comfort Measures Before/During Feeding infant position adjusted;latch adjusted;maternal position adjusted  (small shield)   Latch Assistance yes   Equipment Type   Breast Pump Type double electric, personal   Breast Pump Flange Type hard   Breast Pump Flange Size 24 mm   Reproductive Interventions   Breastfeeding Assistance assisted with positioning;feeding cue recognition promoted;feeding on demand promoted;feeding session observed;infant latch-on verified;infant stimulated to wakeful state;nipple shield utilized;support offered   Breastfeeding Support diary/feeding log utilized;encouragement provided;lactation counseling provided   Breast Pumping   Breast Pumping Interventions (pump if baby is not breastfeeding every 3 hours)   Helped mom with latch and position using small shield. Baby latched well and colostrum in shield. Mom will continue to work on latch and position and will pump if baby is not breastfeeding well or has short feedings. Encouraged as much skin to skin as possible.To call lactation services, if there are questions or concerns.

## 2019-11-04 NOTE — ANESTHESIA POSTPROCEDURE EVALUATION
Patient: Erin Mckeon    Procedure Summary     Date:  11/03/19 Room / Location:      Anesthesia Start:  0229 Anesthesia Stop:  1434    Procedure:  LABOR ANALGESIA Diagnosis:      Scheduled Providers:   Provider:  Kosta Ramos DO    Anesthesia Type:  epidural ASA Status:  2 - Emergent          Anesthesia Type: epidural  Last vitals  BP   110/59 (11/04/19 0800)   Temp   97.6 °F (36.4 °C) (11/04/19 0800)   Pulse   77 (11/04/19 0800)   Resp   18 (11/04/19 0800)     SpO2   97 % (11/03/19 1341)     Post Anesthesia Care and Evaluation    Patient location during evaluation: bedside  Patient participation: complete - patient participated  Level of consciousness: awake and alert  Pain management: adequate  Airway patency: patent  Anesthetic complications: No anesthetic complications    Cardiovascular status: acceptable  Respiratory status: acceptable  Hydration status: acceptable  Post Neuraxial Block status: Motor and sensory function returned to baseline and No signs or symptoms of PDPH

## 2019-11-05 VITALS
HEART RATE: 88 BPM | SYSTOLIC BLOOD PRESSURE: 103 MMHG | OXYGEN SATURATION: 97 % | WEIGHT: 180 LBS | BODY MASS INDEX: 35.34 KG/M2 | TEMPERATURE: 98.2 F | HEIGHT: 60 IN | DIASTOLIC BLOOD PRESSURE: 50 MMHG | RESPIRATION RATE: 16 BRPM

## 2019-11-05 PROCEDURE — 25010000002 MEASLES, MUMPS & RUBELLA VAC RECONSTITUTED SOLUTION: Performed by: ADVANCED PRACTICE MIDWIFE

## 2019-11-05 PROCEDURE — 90471 IMMUNIZATION ADMIN: CPT | Performed by: ADVANCED PRACTICE MIDWIFE

## 2019-11-05 PROCEDURE — 90707 MMR VACCINE SC: CPT | Performed by: ADVANCED PRACTICE MIDWIFE

## 2019-11-05 RX ORDER — DOCUSATE SODIUM 100 MG/1
100 CAPSULE, LIQUID FILLED ORAL 2 TIMES DAILY PRN
Qty: 60 CAPSULE | Refills: 1 | Status: ON HOLD | OUTPATIENT
Start: 2019-11-05 | End: 2021-08-04 | Stop reason: SDUPTHER

## 2019-11-05 RX ORDER — IBUPROFEN 600 MG/1
600 TABLET ORAL EVERY 6 HOURS PRN
Qty: 60 TABLET | Refills: 1 | Status: SHIPPED | OUTPATIENT
Start: 2019-11-05 | End: 2021-07-22 | Stop reason: HOSPADM

## 2019-11-05 RX ORDER — FERROUS SULFATE 325(65) MG
325 TABLET ORAL 2 TIMES DAILY WITH MEALS
Qty: 60 TABLET | Refills: 1 | Status: ON HOLD | OUTPATIENT
Start: 2019-11-05 | End: 2021-08-04 | Stop reason: SDUPTHER

## 2019-11-05 RX ADMIN — PRENATAL VITAMINS-IRON FUMARATE 27 MG IRON-FOLIC ACID 0.8 MG TABLET 1 TABLET: at 09:43

## 2019-11-05 RX ADMIN — MEASLES, MUMPS, AND RUBELLA VIRUS VACCINE LIVE 0.5 ML: 1000; 12500; 1000 INJECTION, POWDER, LYOPHILIZED, FOR SUSPENSION SUBCUTANEOUS at 09:43

## 2019-11-05 RX ADMIN — IBUPROFEN 600 MG: 600 TABLET ORAL at 09:43

## 2019-11-05 NOTE — DISCHARGE SUMMARY
[facility]  Vaginal delivery discharge summary      Patient: Erin Mckeon      MR#:[id number]  Admission  Diagnosis:   Patient Active Problem List   Diagnosis   • Abnormal genetic test during pregnancy   •  (normal spontaneous vaginal delivery)   • Chorioamnionitis     Discharge Diagnosis: S/P spontaneous vaginal birth.       Date of Admission: 2019  Date of Discharge:  2019    Procedures:  Vaginal, Spontaneous     11/3/2019    2:27 PM      Service:  Obstetrics    Hospital Course:  Patient underwent vaginal delivery and remained in the hospital for 3 days.  She was treated with antibiotics for chorioamnionitis. At the time of discharge, she was afebrile and hemodynamically stable.  On the day of discharge, she was eating, ambulating and voiding without difficulty.      Labs:    Lab Results   Component Value Date    WBC 18.44 (H) 2019    HGB 9.9 (L) 2019    HCT 31.8 (L) 2019    MCV 88.6 2019     2019    URICACID 4.0 10/06/2019    AST 20 2019    ALT 19 2019     10/06/2019     Results from last 7 days   Lab Units 19   ABO TYPING  A   RH TYPING  Positive   ANTIBODY SCREEN  Negative       Discharge Medications     Discharge Medications      New Medications      Instructions Start Date   docusate sodium 100 MG capsule   100 mg, Oral, 2 Times Daily PRN      ferrous sulfate 325 (65 FE) MG tablet   325 mg, Oral, 2 Times Daily With Meals      ibuprofen 600 MG tablet  Commonly known as:  ADVIL,MOTRIN   600 mg, Oral, Every 6 Hours PRN         Continue These Medications      Instructions Start Date   Prenatal 27-1 27-1 MG tablet tablet   1 tablet, Oral, Daily             Discharge Disposition:  To Home    Discharge Condition:  Stable. Postpartum anemia prescribed ferrous sulfate.     Discharge Diet: Regular    Activity at Discharge: Pelvic rest    Follow-up Appointments  Follow up with [facility] in 6 weeks.     Víctor Godoy CNM  19  8:59 AM

## 2019-11-05 NOTE — PLAN OF CARE
Problem: Patient Care Overview  Goal: Plan of Care Review  Outcome: Ongoing (interventions implemented as appropriate)   11/05/19 0658   Coping/Psychosocial   Plan of Care Reviewed With patient;spouse   Plan of Care Review   Progress improving   OTHER   Outcome Summary VSS, fundus/lochia/perineum WNL, breastfeeding improving

## 2019-11-05 NOTE — PROGRESS NOTES
11/5/2019  PPD #2    Subjective   Erin feels well.  Patient describes her lochia as less than menses.  Pain is well controlled  She is breastfeeding.     Objective   Temp: Temp:  [97.8 °F (36.6 °C)-98.5 °F (36.9 °C)] 98.2 °F (36.8 °C) Temp src: Oral   BP: BP: (103-116)/(50-70) 103/50        Pulse: Heart Rate:  [78-88] 88  RR: Resp:  [16-18] 16    General:  No acute distress   Abdomen: Fundus firm and beneath umbilicus   Pelvis: deferred     Lab Results   Component Value Date    WBC 18.44 (H) 11/04/2019    HGB 9.9 (L) 11/04/2019    HCT 31.8 (L) 11/04/2019    MCV 88.6 11/04/2019     11/04/2019    HEPBSAG Non-Reactive 05/07/2019    AST 20 11/03/2019    URICACID 4.0 10/06/2019       Assessment  1. PPD# 2 after vaginal delivery  2.   Postpartum anemia  Plan  1. Discharge to home  2. Follow up with [facility]  in 6 weeks  3. Prescriptions for Motrin, ferrous sulfate, and colace prescribed and advised on weaning.  4. Advised no tampons, intercourse, or tub baths for 6 weeks.       This note has been electronically signed.    Víctor Godoy CNM  9:01 AM  November 5, 2019

## 2019-11-08 LAB
BACTERIA SPEC AEROBE CULT: NORMAL
BACTERIA SPEC AEROBE CULT: NORMAL

## 2021-02-08 ENCOUNTER — TRANSCRIBE ORDERS (OUTPATIENT)
Dept: LAB | Facility: HOSPITAL | Age: 26
End: 2021-02-08

## 2021-02-08 ENCOUNTER — LAB (OUTPATIENT)
Dept: LAB | Facility: HOSPITAL | Age: 26
End: 2021-02-08

## 2021-02-08 DIAGNOSIS — Z3A.08 8 WEEKS GESTATION OF PREGNANCY: Primary | ICD-10-CM

## 2021-02-08 DIAGNOSIS — Z34.81 PRENATAL CARE, SUBSEQUENT PREGNANCY, FIRST TRIMESTER: ICD-10-CM

## 2021-02-08 DIAGNOSIS — Z3A.08 8 WEEKS GESTATION OF PREGNANCY: ICD-10-CM

## 2021-02-08 LAB
ABO GROUP BLD: NORMAL
AMPHET+METHAMPHET UR QL: NEGATIVE
AMPHETAMINES UR QL: NEGATIVE
BARBITURATES UR QL SCN: NEGATIVE
BASOPHILS # BLD AUTO: 0.04 10*3/MM3 (ref 0–0.2)
BASOPHILS NFR BLD AUTO: 0.6 % (ref 0–1.5)
BENZODIAZ UR QL SCN: NEGATIVE
BILIRUB UR QL STRIP: NEGATIVE
BLD GP AB SCN SERPL QL: NEGATIVE
BUPRENORPHINE SERPL-MCNC: NEGATIVE NG/ML
CANNABINOIDS SERPL QL: NEGATIVE
CLARITY UR: ABNORMAL
COCAINE UR QL: NEGATIVE
COLOR UR: ABNORMAL
DEPRECATED RDW RBC AUTO: 40.5 FL (ref 37–54)
EOSINOPHIL # BLD AUTO: 0.22 10*3/MM3 (ref 0–0.4)
EOSINOPHIL NFR BLD AUTO: 3.2 % (ref 0.3–6.2)
ERYTHROCYTE [DISTWIDTH] IN BLOOD BY AUTOMATED COUNT: 12.6 % (ref 12.3–15.4)
GLUCOSE SERPL-MCNC: 76 MG/DL (ref 65–99)
GLUCOSE UR STRIP-MCNC: NEGATIVE MG/DL
HBV SURFACE AG SERPL QL IA: NORMAL
HCT VFR BLD AUTO: 38.8 % (ref 34–46.6)
HCV AB SER DONR QL: NORMAL
HGB BLD-MCNC: 13.2 G/DL (ref 12–15.9)
HGB UR QL STRIP.AUTO: NEGATIVE
HIV1+2 AB SER QL: NORMAL
IMM GRANULOCYTES # BLD AUTO: 0.04 10*3/MM3 (ref 0–0.05)
IMM GRANULOCYTES NFR BLD AUTO: 0.6 % (ref 0–0.5)
KETONES UR QL STRIP: ABNORMAL
LEUKOCYTE ESTERASE UR QL STRIP.AUTO: NEGATIVE
LYMPHOCYTES # BLD AUTO: 1.58 10*3/MM3 (ref 0.7–3.1)
LYMPHOCYTES NFR BLD AUTO: 23.2 % (ref 19.6–45.3)
MCH RBC QN AUTO: 29.9 PG (ref 26.6–33)
MCHC RBC AUTO-ENTMCNC: 34 G/DL (ref 31.5–35.7)
MCV RBC AUTO: 88 FL (ref 79–97)
METHADONE UR QL SCN: NEGATIVE
MONOCYTES # BLD AUTO: 0.38 10*3/MM3 (ref 0.1–0.9)
MONOCYTES NFR BLD AUTO: 5.6 % (ref 5–12)
NEUTROPHILS NFR BLD AUTO: 4.54 10*3/MM3 (ref 1.7–7)
NEUTROPHILS NFR BLD AUTO: 66.8 % (ref 42.7–76)
NITRITE UR QL STRIP: NEGATIVE
NRBC BLD AUTO-RTO: 0 /100 WBC (ref 0–0.2)
OPIATES UR QL: NEGATIVE
OXYCODONE UR QL SCN: NEGATIVE
PCP UR QL SCN: NEGATIVE
PH UR STRIP.AUTO: 6.5 [PH] (ref 5–8)
PLATELET # BLD AUTO: 282 10*3/MM3 (ref 140–450)
PMV BLD AUTO: 11.2 FL (ref 6–12)
PROPOXYPH UR QL: NEGATIVE
PROT UR QL STRIP: ABNORMAL
RBC # BLD AUTO: 4.41 10*6/MM3 (ref 3.77–5.28)
RH BLD: POSITIVE
RPR SER QL: NORMAL
RUBV IGG SERPL IA-ACNC: NORMAL
SP GR UR STRIP: >=1.03 (ref 1–1.03)
TRICYCLICS UR QL SCN: NEGATIVE
UROBILINOGEN UR QL STRIP: ABNORMAL
WBC # BLD AUTO: 6.8 10*3/MM3 (ref 3.4–10.8)

## 2021-02-08 PROCEDURE — 80081 OBSTETRIC PANEL INC HIV TSTG: CPT

## 2021-02-08 PROCEDURE — 81003 URINALYSIS AUTO W/O SCOPE: CPT

## 2021-02-08 PROCEDURE — 82947 ASSAY GLUCOSE BLOOD QUANT: CPT

## 2021-02-08 PROCEDURE — 80306 DRUG TEST PRSMV INSTRMNT: CPT

## 2021-02-08 PROCEDURE — 86803 HEPATITIS C AB TEST: CPT

## 2021-02-08 PROCEDURE — 36415 COLL VENOUS BLD VENIPUNCTURE: CPT

## 2021-05-26 ENCOUNTER — LAB (OUTPATIENT)
Dept: LAB | Facility: HOSPITAL | Age: 26
End: 2021-05-26

## 2021-05-26 ENCOUNTER — TRANSCRIBE ORDERS (OUTPATIENT)
Dept: LAB | Facility: HOSPITAL | Age: 26
End: 2021-05-26

## 2021-05-26 DIAGNOSIS — Z3A.28 28 WEEKS GESTATION OF PREGNANCY: ICD-10-CM

## 2021-05-26 DIAGNOSIS — Z34.03 ENCOUNTER FOR SUPERVISION OF NORMAL FIRST PREGNANCY IN THIRD TRIMESTER: ICD-10-CM

## 2021-05-26 DIAGNOSIS — Z3A.28 28 WEEKS GESTATION OF PREGNANCY: Primary | ICD-10-CM

## 2021-05-26 LAB
BLD GP AB SCN SERPL QL: NEGATIVE
DEPRECATED RDW RBC AUTO: 42.2 FL (ref 37–54)
ERYTHROCYTE [DISTWIDTH] IN BLOOD BY AUTOMATED COUNT: 12.7 % (ref 12.3–15.4)
GLUCOSE 1H P 100 G GLC PO SERPL-MCNC: 113 MG/DL (ref 65–140)
HCT VFR BLD AUTO: 35 % (ref 34–46.6)
HGB BLD-MCNC: 11.5 G/DL (ref 12–15.9)
MCH RBC QN AUTO: 29.5 PG (ref 26.6–33)
MCHC RBC AUTO-ENTMCNC: 32.9 G/DL (ref 31.5–35.7)
MCV RBC AUTO: 89.7 FL (ref 79–97)
PLATELET # BLD AUTO: 293 10*3/MM3 (ref 140–450)
PMV BLD AUTO: 10.8 FL (ref 6–12)
RBC # BLD AUTO: 3.9 10*6/MM3 (ref 3.77–5.28)
WBC # BLD AUTO: 11.57 10*3/MM3 (ref 3.4–10.8)

## 2021-05-26 PROCEDURE — 82950 GLUCOSE TEST: CPT

## 2021-05-26 PROCEDURE — 85027 COMPLETE CBC AUTOMATED: CPT

## 2021-05-26 PROCEDURE — 86850 RBC ANTIBODY SCREEN: CPT

## 2021-05-26 PROCEDURE — 36415 COLL VENOUS BLD VENIPUNCTURE: CPT

## 2021-07-21 ENCOUNTER — HOSPITAL ENCOUNTER (OUTPATIENT)
Facility: HOSPITAL | Age: 26
Discharge: HOME | End: 2021-07-22
Attending: OBSTETRICS & GYNECOLOGY | Admitting: OBSTETRICS & GYNECOLOGY

## 2021-07-21 PROBLEM — Z34.90 PREGNANCY: Status: ACTIVE | Noted: 2021-07-21

## 2021-07-21 LAB
ABO GROUP BLD: NORMAL
ALP SERPL-CCNC: 140 U/L (ref 39–117)
ALT SERPL W P-5'-P-CCNC: 37 U/L (ref 1–33)
AST SERPL-CCNC: 31 U/L (ref 1–32)
BILIRUB SERPL-MCNC: 0.3 MG/DL (ref 0–1.2)
BLD GP AB SCN SERPL QL: NEGATIVE
CREAT SERPL-MCNC: 0.36 MG/DL (ref 0.57–1)
DEPRECATED RDW RBC AUTO: 44.8 FL (ref 37–54)
ERYTHROCYTE [DISTWIDTH] IN BLOOD BY AUTOMATED COUNT: 13.9 % (ref 12.3–15.4)
FIBRINOGEN PPP-MCNC: 387 MG/DL (ref 220–470)
HCT VFR BLD AUTO: 36.5 % (ref 34–46.6)
HGB BLD-MCNC: 11.7 G/DL (ref 12–15.9)
LDH SERPL-CCNC: 225 U/L (ref 135–214)
MCH RBC QN AUTO: 28.5 PG (ref 26.6–33)
MCHC RBC AUTO-ENTMCNC: 32.1 G/DL (ref 31.5–35.7)
MCV RBC AUTO: 88.8 FL (ref 79–97)
PLATELET # BLD AUTO: 238 10*3/MM3 (ref 140–450)
PMV BLD AUTO: 11 FL (ref 6–12)
RBC # BLD AUTO: 4.11 10*6/MM3 (ref 3.77–5.28)
RH BLD: POSITIVE
SARS-COV-2 RDRP RESP QL NAA+PROBE: NORMAL
T&S EXPIRATION DATE: NORMAL
URATE SERPL-MCNC: 4.2 MG/DL (ref 2.4–5.7)
WBC # BLD AUTO: 11.23 10*3/MM3 (ref 3.4–10.8)

## 2021-07-21 PROCEDURE — 25010000002 MORPHINE PER 10 MG: Performed by: ADVANCED PRACTICE MIDWIFE

## 2021-07-21 PROCEDURE — 84075 ASSAY ALKALINE PHOSPHATASE: CPT | Performed by: OBSTETRICS & GYNECOLOGY

## 2021-07-21 PROCEDURE — G0463 HOSPITAL OUTPT CLINIC VISIT: HCPCS

## 2021-07-21 PROCEDURE — 85027 COMPLETE CBC AUTOMATED: CPT | Performed by: OBSTETRICS & GYNECOLOGY

## 2021-07-21 PROCEDURE — 96375 TX/PRO/DX INJ NEW DRUG ADDON: CPT

## 2021-07-21 PROCEDURE — 87635 SARS-COV-2 COVID-19 AMP PRB: CPT | Performed by: OBSTETRICS & GYNECOLOGY

## 2021-07-21 PROCEDURE — 84550 ASSAY OF BLOOD/URIC ACID: CPT | Performed by: OBSTETRICS & GYNECOLOGY

## 2021-07-21 PROCEDURE — C9803 HOPD COVID-19 SPEC COLLECT: HCPCS

## 2021-07-21 PROCEDURE — 82565 ASSAY OF CREATININE: CPT | Performed by: OBSTETRICS & GYNECOLOGY

## 2021-07-21 PROCEDURE — 25010000002 ONDANSETRON PER 1 MG: Performed by: OBSTETRICS & GYNECOLOGY

## 2021-07-21 PROCEDURE — 25010000002 DIPHENHYDRAMINE PER 50 MG

## 2021-07-21 PROCEDURE — 82247 BILIRUBIN TOTAL: CPT | Performed by: OBSTETRICS & GYNECOLOGY

## 2021-07-21 PROCEDURE — 83615 LACTATE (LD) (LDH) ENZYME: CPT | Performed by: OBSTETRICS & GYNECOLOGY

## 2021-07-21 PROCEDURE — 63710000001 PROMETHAZINE PER 25 MG: Performed by: ADVANCED PRACTICE MIDWIFE

## 2021-07-21 PROCEDURE — 96374 THER/PROPH/DIAG INJ IV PUSH: CPT

## 2021-07-21 PROCEDURE — 84450 TRANSFERASE (AST) (SGOT): CPT | Performed by: OBSTETRICS & GYNECOLOGY

## 2021-07-21 PROCEDURE — 85384 FIBRINOGEN ACTIVITY: CPT | Performed by: OBSTETRICS & GYNECOLOGY

## 2021-07-21 PROCEDURE — 84460 ALANINE AMINO (ALT) (SGPT): CPT | Performed by: OBSTETRICS & GYNECOLOGY

## 2021-07-21 PROCEDURE — 86901 BLOOD TYPING SEROLOGIC RH(D): CPT | Performed by: ADVANCED PRACTICE MIDWIFE

## 2021-07-21 PROCEDURE — 86850 RBC ANTIBODY SCREEN: CPT | Performed by: ADVANCED PRACTICE MIDWIFE

## 2021-07-21 PROCEDURE — 59025 FETAL NON-STRESS TEST: CPT

## 2021-07-21 PROCEDURE — 86900 BLOOD TYPING SEROLOGIC ABO: CPT | Performed by: ADVANCED PRACTICE MIDWIFE

## 2021-07-21 PROCEDURE — 96361 HYDRATE IV INFUSION ADD-ON: CPT

## 2021-07-21 RX ORDER — SODIUM CHLORIDE, SODIUM LACTATE, POTASSIUM CHLORIDE, CALCIUM CHLORIDE 600; 310; 30; 20 MG/100ML; MG/100ML; MG/100ML; MG/100ML
125 INJECTION, SOLUTION INTRAVENOUS CONTINUOUS
Status: DISCONTINUED | OUTPATIENT
Start: 2021-07-21 | End: 2021-07-22 | Stop reason: HOSPADM

## 2021-07-21 RX ORDER — DIPHENHYDRAMINE HYDROCHLORIDE 50 MG/ML
25 INJECTION INTRAMUSCULAR; INTRAVENOUS EVERY 6 HOURS PRN
Status: DISCONTINUED | OUTPATIENT
Start: 2021-07-21 | End: 2021-07-22 | Stop reason: HOSPADM

## 2021-07-21 RX ORDER — MORPHINE SULFATE 10 MG/ML
6 INJECTION INTRAMUSCULAR; INTRAVENOUS; SUBCUTANEOUS ONCE
Status: COMPLETED | OUTPATIENT
Start: 2021-07-21 | End: 2021-07-21

## 2021-07-21 RX ORDER — ONDANSETRON 2 MG/ML
4 INJECTION INTRAMUSCULAR; INTRAVENOUS EVERY 6 HOURS PRN
Status: DISCONTINUED | OUTPATIENT
Start: 2021-07-21 | End: 2021-07-22 | Stop reason: HOSPADM

## 2021-07-21 RX ORDER — SODIUM CHLORIDE, SODIUM LACTATE, POTASSIUM CHLORIDE, CALCIUM CHLORIDE 600; 310; 30; 20 MG/100ML; MG/100ML; MG/100ML; MG/100ML
999 INJECTION, SOLUTION INTRAVENOUS CONTINUOUS
Status: DISCONTINUED | OUTPATIENT
Start: 2021-07-21 | End: 2021-07-22 | Stop reason: HOSPADM

## 2021-07-21 RX ORDER — NIFEDIPINE 10 MG/1
10 CAPSULE ORAL ONCE
Status: COMPLETED | OUTPATIENT
Start: 2021-07-21 | End: 2021-07-21

## 2021-07-21 RX ORDER — DIPHENHYDRAMINE HYDROCHLORIDE 50 MG/ML
INJECTION INTRAMUSCULAR; INTRAVENOUS
Status: COMPLETED
Start: 2021-07-21 | End: 2021-07-21

## 2021-07-21 RX ORDER — METOCLOPRAMIDE HYDROCHLORIDE 5 MG/ML
10 INJECTION INTRAMUSCULAR; INTRAVENOUS EVERY 4 HOURS PRN
Status: DISCONTINUED | OUTPATIENT
Start: 2021-07-21 | End: 2021-07-22 | Stop reason: HOSPADM

## 2021-07-21 RX ORDER — FAMOTIDINE 10 MG/ML
20 INJECTION, SOLUTION INTRAVENOUS 2 TIMES DAILY PRN
Status: DISCONTINUED | OUTPATIENT
Start: 2021-07-21 | End: 2021-07-22 | Stop reason: HOSPADM

## 2021-07-21 RX ORDER — SODIUM CHLORIDE 0.9 % (FLUSH) 0.9 %
10 SYRINGE (ML) INJECTION EVERY 12 HOURS SCHEDULED
Status: DISCONTINUED | OUTPATIENT
Start: 2021-07-21 | End: 2021-07-22 | Stop reason: HOSPADM

## 2021-07-21 RX ORDER — PROMETHAZINE HYDROCHLORIDE 25 MG/1
25 TABLET ORAL ONCE
Status: COMPLETED | OUTPATIENT
Start: 2021-07-21 | End: 2021-07-21

## 2021-07-21 RX ORDER — SODIUM CHLORIDE 0.9 % (FLUSH) 0.9 %
10 SYRINGE (ML) INJECTION AS NEEDED
Status: DISCONTINUED | OUTPATIENT
Start: 2021-07-21 | End: 2021-07-22 | Stop reason: HOSPADM

## 2021-07-21 RX ADMIN — PROMETHAZINE HYDROCHLORIDE 25 MG: 25 TABLET ORAL at 20:33

## 2021-07-21 RX ADMIN — MORPHINE SULFATE 6 MG: 10 INJECTION INTRAVENOUS at 20:33

## 2021-07-21 RX ADMIN — DIPHENHYDRAMINE HYDROCHLORIDE 25 MG: 50 INJECTION INTRAMUSCULAR; INTRAVENOUS at 20:50

## 2021-07-21 RX ADMIN — SODIUM CHLORIDE, POTASSIUM CHLORIDE, SODIUM LACTATE AND CALCIUM CHLORIDE 999 ML/HR: 600; 310; 30; 20 INJECTION, SOLUTION INTRAVENOUS at 19:35

## 2021-07-21 RX ADMIN — ONDANSETRON 4 MG: 2 INJECTION INTRAMUSCULAR; INTRAVENOUS at 19:37

## 2021-07-21 RX ADMIN — SODIUM CHLORIDE, POTASSIUM CHLORIDE, SODIUM LACTATE AND CALCIUM CHLORIDE 125 ML/HR: 600; 310; 30; 20 INJECTION, SOLUTION INTRAVENOUS at 19:58

## 2021-07-21 RX ADMIN — NIFEDIPINE 10 MG: 10 CAPSULE ORAL at 22:35

## 2021-07-21 NOTE — H&P
History and Physical:    Subjective     Chief Complaint   Patient presents with   • Decreased Fetal Movement       Erin Mckeon is a 25 y.o. year old  with an Estimated Date of Delivery: 21 currently at 35w2d was working today and started having contractions.  She also hasn't felt the baby move as much as usual.  She feels tightening but not full contractions. She denies leaking or loss of fluid. No vaginal bleeding    Prenatal care has been with Quin Ambriz MD.  It has been significant for normal pregnancy..        Review of Systems  Pertinent items are noted in HPI.  A comprehensive review of systems was negative.     Past Medical History:   Diagnosis Date   • Kidney stone      No past surgical history on file.  No family history on file.  Social History     Tobacco Use   • Smoking status: Never Smoker   • Smokeless tobacco: Never Used   Substance Use Topics   • Alcohol use: No   • Drug use: No     Medications Prior to Admission   Medication Sig Dispense Refill Last Dose   • ondansetron ODT (ZOFRAN-ODT) 8 MG disintegrating tablet Place 1 tablet on the tongue Every 8 (Eight) Hours As Needed. 20 tablet 2 2021 at 0100   • Prenatal Vit-Fe Fumarate-FA (PRENATAL 27-) 27-1 MG tablet tablet Take 1 tablet by mouth Daily.   2021 at 2200   • sertraline (ZOLOFT) 50 MG tablet Take 1 tablet by mouth Daily. 90 tablet 4 2021 at 2200   • docusate sodium (COLACE) 100 MG capsule Take 1 capsule by mouth 2 (Two) Times a Day As Needed for Constipation. 60 capsule 1    • ferrous sulfate 325 (65 FE) MG tablet Take 1 tablet by mouth 2 (Two) Times a Day With Meals. 60 tablet 1    • ibuprofen (ADVIL,MOTRIN) 600 MG tablet Take 1 tablet by mouth Every 6 (Six) Hours As Needed for Mild Pain . 60 tablet 1    • ondansetron ODT (ZOFRAN-ODT) 4 MG disintegrating tablet Place 1 tablet under the tongue Every 6 (Six) Hours As Needed for nausea 20 tablet 2    • Tdap (BOOSTRIX) 5-2.5-18.5 LF-MCG/0.5 injection Inject  into  "the appropriate muscle as directed by prescriber. 1 each 0      Allergies:  Eggs or egg-derived products and Penicillins  OB History    Para Term  AB Living   2 1 1 0 0 1   SAB TAB Ectopic Molar Multiple Live Births   0 0 0 0 0 1      # Outcome Date GA Lbr Dwight/2nd Weight Sex Delivery Anes PTL Lv   2 Current            1 Term 19 38w0d 20:04 / 01:23 3110 g (6 lb 13.7 oz) F Vag-Spont EPI N ZO      Complications: Chorioamnionitis         Objective     Ht 152.4 cm (60\")   Wt 79.8 kg (176 lb)   BMI 34.37 kg/m²     Physical Exam    General:  No acute distress           Abdomen: Gravid, nontender       FHT's: 130's -140's with accelerations category 1   Cervix: 3/70/blottable. (Vertex)   [place]: Contraction are every 3-4 minutes     Lab Review   External Prenatal Results     Pregnancy Outside Results - Transcribed From Office Records - See Scanned Records For Details     Test Value Date Time    ABO  A  21 1101    Rh  Positive  21 1101    Antibody Screen  Negative  21 1019       Negative  21 1101    Varicella IgG       Rubella  Equivocal  21 1101    Hgb  11.5 g/dL 21 1019       13.2 g/dL 21 1101    Hct  35.0 % 21 1019       38.8 % 21 1101    Glucose Fasting GTT       Glucose Tolerance Test 1 hour       Glucose Tolerance Test 3 hour       Gonorrhea (discrete)       Chlamydia (discrete)       RPR  Non-Reactive  21 1101    VDRL       Syphilis Antibody       HBsAg  Non-Reactive  21 1101    Herpes Simplex Virus PCR       Herpes Simplex VIrus Culture       HIV  Non-Reactive  21 1101    Hep C RNA Quant PCR       Hep C Antibody  Non-Reactive  21 1101    AFP       Group B Strep ^ POS  10/23/19     GBS Susceptibility to Clindamycin       GBS Susceptibility to Erythromycin       Fetal Fibronectin       Genetic Testing, Maternal Blood             Drug Screening     Test Value Date Time    Urine Drug Screen       Amphetamine Screen  " Negative  02/08/21 1101    Barbiturate Screen  Negative  02/08/21 1101    Benzodiazepine Screen  Negative  02/08/21 1101    Methadone Screen  Negative  02/08/21 1101    Phencyclidine Screen  Negative  02/08/21 1101    Opiates Screen  Negative  02/08/21 1101    THC Screen  Negative  02/08/21 1101    Cocaine Screen       Propoxyphene Screen  Negative  02/08/21 1101    Buprenorphine Screen  Negative  02/08/21 1101    Methamphetamine Screen       Oxycodone Screen  Negative  02/08/21 1101    Tricyclic Antidepressants Screen  Negative  02/08/21 1101          Legend    ^: Historical                            Assessment/Plan     ASSESSMENT  1. IUP at 35w2d  2. Contractions   3. GBS not done. Scheduled for next visit  PLAN  1. OBV to labor and delivery   2.   If contractions stop she may go home this evening   3.   Ok to shower   4.   Ok to eat         Linda Bowie DO  7/21/2021@

## 2021-07-22 VITALS
DIASTOLIC BLOOD PRESSURE: 69 MMHG | TEMPERATURE: 98.5 F | OXYGEN SATURATION: 98 % | HEART RATE: 101 BPM | HEIGHT: 60 IN | WEIGHT: 176 LBS | SYSTOLIC BLOOD PRESSURE: 121 MMHG | BODY MASS INDEX: 34.55 KG/M2 | RESPIRATION RATE: 18 BRPM

## 2021-07-22 PROBLEM — O47.00 PRETERM UTERINE CONTRACTIONS, ANTEPARTUM: Status: ACTIVE | Noted: 2021-07-22

## 2021-07-22 LAB
BACTERIA UR QL AUTO: ABNORMAL /HPF
BILIRUB UR QL STRIP: NEGATIVE
CLARITY UR: ABNORMAL
COLOR UR: YELLOW
GLUCOSE UR STRIP-MCNC: NEGATIVE MG/DL
HGB UR QL STRIP.AUTO: NEGATIVE
HYALINE CASTS UR QL AUTO: ABNORMAL /LPF
KETONES UR QL STRIP: ABNORMAL
LEUKOCYTE ESTERASE UR QL STRIP.AUTO: NEGATIVE
NITRITE UR QL STRIP: NEGATIVE
PH UR STRIP.AUTO: 7 [PH] (ref 5–8)
PROT UR QL STRIP: NEGATIVE
RBC # UR: ABNORMAL /HPF
REF LAB TEST METHOD: ABNORMAL
SP GR UR STRIP: 1.02 (ref 1–1.03)
SQUAMOUS #/AREA URNS HPF: ABNORMAL /HPF
UROBILINOGEN UR QL STRIP: ABNORMAL
WBC UR QL AUTO: ABNORMAL /HPF

## 2021-07-22 PROCEDURE — 25010000002 BUTORPHANOL PER 1 MG: Performed by: ADVANCED PRACTICE MIDWIFE

## 2021-07-22 PROCEDURE — 96361 HYDRATE IV INFUSION ADD-ON: CPT

## 2021-07-22 PROCEDURE — 59025 FETAL NON-STRESS TEST: CPT

## 2021-07-22 PROCEDURE — 87086 URINE CULTURE/COLONY COUNT: CPT | Performed by: ADVANCED PRACTICE MIDWIFE

## 2021-07-22 PROCEDURE — 96375 TX/PRO/DX INJ NEW DRUG ADDON: CPT

## 2021-07-22 PROCEDURE — 81001 URINALYSIS AUTO W/SCOPE: CPT | Performed by: ADVANCED PRACTICE MIDWIFE

## 2021-07-22 RX ORDER — NIFEDIPINE 10 MG/1
10 CAPSULE ORAL ONCE
Status: COMPLETED | OUTPATIENT
Start: 2021-07-22 | End: 2021-07-22

## 2021-07-22 RX ORDER — BUTORPHANOL TARTRATE 1 MG/ML
1 INJECTION, SOLUTION INTRAMUSCULAR; INTRAVENOUS
Status: DISCONTINUED | OUTPATIENT
Start: 2021-07-22 | End: 2021-07-22 | Stop reason: HOSPADM

## 2021-07-22 RX ADMIN — SODIUM CHLORIDE, POTASSIUM CHLORIDE, SODIUM LACTATE AND CALCIUM CHLORIDE 125 ML/HR: 600; 310; 30; 20 INJECTION, SOLUTION INTRAVENOUS at 01:47

## 2021-07-22 RX ADMIN — NIFEDIPINE 10 MG: 10 CAPSULE ORAL at 00:27

## 2021-07-22 RX ADMIN — BUTORPHANOL TARTRATE 1 MG: 1 INJECTION, SOLUTION INTRAMUSCULAR; INTRAVENOUS at 00:27

## 2021-07-22 NOTE — PROGRESS NOTES
Amy Mckeon  : 1995  MRN: [id number]  CSN: [id number]    Antepartum progress note    Subjective     Erin is feeling less pain with contractions. She felt itching in her arms after Morphine was administered. She is feeling better now and denies SOB or chest tightness.      Objective   Min/max vitals past 24 hours:  Temp  Min: 98.1 °F (36.7 °C)  Max: 98.5 °F (36.9 °C)   BP  Min: 119/76  Max: 126/82   Pulse  Min: 80  Max: 107   Resp  Min: 16  Max: 16        FHT's: reassuring and category 1   Cervix: was checked by RN 3/70/shay   Contractions: regular every 2-4 minutes - external monitors used        Assessment   1. IUP at 35w2d  2. Fetal status reassuring  3. Premature labor contractions     Plan   1. Therapeutic rest with Morphine and Phenergan   2. IV hydrated  3. Continue to monitor    Víctor Godoy CNM  2021  21:14 EDT

## 2021-07-22 NOTE — PROGRESS NOTES
Amy Mckeon  : 1995  MRN: [id number]  CSN: [id number]    Antepartum progress note    Subjective    Erin is feeling rare contractions at rest.  GFM.  No LOF or VB     Objective   Min/max vitals past 24 hours:  Temp  Min: 98 °F (36.7 °C)  Max: 99.2 °F (37.3 °C)   BP  Min: 95/51  Max: 126/82   Pulse  Min: 80  Max: 122   Resp  Min: 16  Max: 18        FHT's: reassuring and category 1   Cervix: was checked by RN 3/70/shay   Contractions: q10min  - external monitors used        Assessment   1. IUP at 35w3d  2. Fetal status reassuring  3. Premature labor contractions, no cervical change since admit     Plan   1. D/c home  2. PTL precautions reviewed  3. Declines procardia    Sonya Montiel MD  2021  13:17 EDT

## 2021-07-24 LAB — BACTERIA SPEC AEROBE CULT: NORMAL

## 2021-07-26 ENCOUNTER — HOSPITAL ENCOUNTER (OUTPATIENT)
Facility: HOSPITAL | Age: 26
Discharge: HOME | End: 2021-07-26
Attending: OBSTETRICS & GYNECOLOGY | Admitting: OBSTETRICS & GYNECOLOGY

## 2021-07-26 VITALS
HEART RATE: 106 BPM | SYSTOLIC BLOOD PRESSURE: 126 MMHG | HEIGHT: 60 IN | TEMPERATURE: 98.6 F | WEIGHT: 181 LBS | RESPIRATION RATE: 16 BRPM | BODY MASS INDEX: 35.53 KG/M2 | DIASTOLIC BLOOD PRESSURE: 88 MMHG

## 2021-07-26 LAB — EXTERNAL GROUP B STREP ANTIGEN: NORMAL

## 2021-07-26 PROCEDURE — 59025 FETAL NON-STRESS TEST: CPT

## 2021-07-26 PROCEDURE — 99213 OFFICE O/P EST LOW 20 MIN: CPT | Performed by: OBSTETRICS & GYNECOLOGY

## 2021-07-26 PROCEDURE — 59025 FETAL NON-STRESS TEST: CPT | Performed by: OBSTETRICS & GYNECOLOGY

## 2021-07-26 PROCEDURE — G0463 HOSPITAL OUTPT CLINIC VISIT: HCPCS

## 2021-07-26 NOTE — H&P
[facility]  Obstetric History and Physical    Chief Complaint   Patient presents with   • Decreased Fetal Movement       Subjective     Patient is a 26 y.o. female  currently at 36w0d, who presents with complaints of decreased fetal movement beginning this afternoon. She denies vaginal bleeding or leakage of fluid. She has not been feeling any irregular contractions. She denies abdominal pain.    Her prenatal care is reportedly benign. Her previous obstetric/gynecological history is notable for a prior term vaginal delivery.    The following portions of the patients history were reviewed and updated as appropriate: current medications, allergies, past medical history, past surgical history, past family history, past social history and problem list .        Prenatal Information:   Maternal Prenatal Labs  Blood Type No results found for: ABO   Rh Status No results found for: RH   Antibody Screen No results found for: ABSCRN   Gonnorhea No results found for: GCCX   Chlamydia No results found for: CLAMYDCU   RPR No results found for: RPR   Syphilis Antibody No results found for: SYPHILIS   Rubella No results found for: RUBELLAIGGIN   Hepatitis B Surface Antigen No results found for: HEPBSAG   HIV-1 Antibody No results found for: LABHIV1   Hepatitis C Antibody No results found for: HEPCAB   Rapid Urin Drug Screen No results found for: AMPMETHU, BARBITSCNUR, LABBENZSCN, LABMETHSCN, LABOPIASCN, THCURSCR, COCAINEUR, AMPHETSCREEN, PROPOXSCN, BUPRENORSCNU, METAMPSCNUR, OXYCODONESCN, TRICYCLICSCN   Group B Strep Culture No results found for: GBSANTIGEN           External Prenatal Results     Pregnancy Outside Results - Transcribed From Office Records - See Scanned Records For Details     Test Value Date Time    ABO  A  21    Rh  Positive  21    Antibody Screen  Negative  21       Negative  21 1019       Negative  21 1101    Varicella IgG       Rubella  Equivocal  21  1101    Hgb  11.7 g/dL 21 1815       11.5 g/dL 21 1019       13.2 g/dL 21 1101    Hct  36.5 % 21 1815       35.0 % 21 1019       38.8 % 21 1101    Glucose Fasting GTT       Glucose Tolerance Test 1 hour       Glucose Tolerance Test 3 hour       Gonorrhea (discrete)       Chlamydia (discrete)       RPR  Non-Reactive  21 1101    VDRL       Syphilis Antibody       HBsAg  Non-Reactive  21 1101    Herpes Simplex Virus PCR       Herpes Simplex VIrus Culture       HIV  Non-Reactive  21 1101    Hep C RNA Quant PCR       Hep C Antibody  Non-Reactive  21 1101    AFP       Group B Strep ^ POS  10/23/19     GBS Susceptibility to Clindamycin       GBS Susceptibility to Erythromycin       Fetal Fibronectin       Genetic Testing, Maternal Blood             Drug Screening     Test Value Date Time    Urine Drug Screen       Amphetamine Screen  Negative  21 1101    Barbiturate Screen  Negative  21 1101    Benzodiazepine Screen  Negative  21 1101    Methadone Screen  Negative  21 1101    Phencyclidine Screen  Negative  21 1101    Opiates Screen  Negative  21 1101    THC Screen  Negative  21 1101    Cocaine Screen       Propoxyphene Screen  Negative  21 1101    Buprenorphine Screen  Negative  21 1101    Methamphetamine Screen       Oxycodone Screen  Negative  21 1101    Tricyclic Antidepressants Screen  Negative  21 1101          Legend    ^: Historical                          Past OB History:       OB History    Para Term  AB Living   2 1 1 0 0 1   SAB TAB Ectopic Molar Multiple Live Births   0 0 0 0 0 1      # Outcome Date GA Lbr Dwight/2nd Weight Sex Delivery Anes PTL Lv   2 Current            1 Term 19 38w0d 20:04 / 01:23 3110 g (6 lb 13.7 oz) F Vag-Spont EPI N ZO      Complications: Chorioamnionitis      Name: JOHN BAUGH      Apgar1: 8  Apgar5: 9       Past Medical History:  Past  Medical History:   Diagnosis Date   • Kidney stone       Past Surgical History History reviewed. No pertinent surgical history.   Family History: History reviewed. No pertinent family history.   Social History:  reports that she has never smoked. She has never used smokeless tobacco.   reports no history of alcohol use.   reports no history of drug use.   Allergies: Eggs or egg-derived products and Penicillins  Current Medications:          No current facility-administered medications on file prior to encounter.     Current Outpatient Medications on File Prior to Encounter   Medication Sig Dispense Refill   • ondansetron ODT (ZOFRAN-ODT) 4 MG disintegrating tablet Place 1 tablet under the tongue Every 6 (Six) Hours As Needed for nausea 20 tablet 2   • Prenatal Vit-Fe Fumarate-FA (PRENATAL 27-1) 27-1 MG tablet tablet Take 1 tablet by mouth Daily.     • sertraline (ZOLOFT) 50 MG tablet Take 1 tablet by mouth Daily. 90 tablet 4   • docusate sodium (COLACE) 100 MG capsule Take 1 capsule by mouth 2 (Two) Times a Day As Needed for Constipation. 60 capsule 1   • ferrous sulfate 325 (65 FE) MG tablet Take 1 tablet by mouth 2 (Two) Times a Day With Meals. 60 tablet 1   • ondansetron ODT (ZOFRAN-ODT) 8 MG disintegrating tablet Place 1 tablet on the tongue Every 8 (Eight) Hours As Needed. 20 tablet 2       General ROS: Pertinent items are noted in HPI, all other systems reviewed and negative    Objective       Vital Signs Range for the last 24 hours  Temperature: Temp:  [98.6 °F (37 °C)] 98.6 °F (37 °C)   Temp Source: Temp src: Oral   BP: BP: (126)/(88) 126/88   Pulse: Heart Rate:  [106] 106   Respirations: Resp:  [16] 16   SPO2:     O2 Amount (l/min):     O2 Devices     Weight: Weight:  [82.1 kg (181 lb)] 82.1 kg (181 lb)     Physical Examination: General appearance - alert, well appearing, and in no distress, oriented to person, place, and time and normal appearing weight  Mental status - alert, oriented to person, place,  and time, normal mood, behavior, speech, dress, motor activity, and thought processes  Eyes - pupils equal and reactive, extraocular eye movements intact, sclera anicteric  Neck - supple, no significant adenopathy, thyroid exam: thyroid is normal in size without nodules or tenderness  Chest - clear to auscultation, no wheezes, rales or rhonchi, symmetric air entry  Heart - normal rate, regular rhythm, normal S1, S2, no murmurs, rubs, clicks or gallops  Abdomen-soft, nontender, nondistended, no masses or organomegaly   Abdomen, Non-Tender  Pelvic -pelvic exam per nursing staff: 2-3 cm  Neurological - alert, oriented, normal speech, no focal findings or movement disorder noted, DTR's normal and symmetric  Extremities - no pedal edema noted    Fetal Heart Rate Assessment  Indication: Decreased fetal movement   Start Time:                 end Time:    NST Results: Reactive NST. Fetal heart rate baseline 125-135 bpm. Average variability with multiple 15 x 15 accelerations. No decelerations. Contractions noted every 2-4 minutes with spontaneous negative CST.        Laboratory Results:   Lab Results   Component Value Date    ALKPHOS 140 (H) 2021    ALT 37 (H) 2021    AST 31 2021    CREATININE 0.36 (L) 2021    BILITOT 0.3 2021     (H) 2021    URICACID 4.2 2021       No results found for: WBC, RBC, HGB, HCT, MCV, MCH, MCHC, RDW, RDWSD, MPV, PLT, NEUTRORELPCT, LYMPHORELPCT, MONORELPCT, EOSRELPCT, BASORELPCT, AUTOIGPER, NEUTROABS, LYMPHSABS, MONOSABS, EOSABS, BASOSABS, AUTOIGNUM, NRBC    Results from last 7 days   Lab Units 21   ABO TYPING  A   RH TYPING  Positive   ANTIBODY SCREEN  Negative       Brief Urine Lab Results  (Last result in the past 365 days)      Color   Clarity   Blood   Leuk Est   Nitrite   Protein   CREAT   Urine HCG        21 0014 Yellow Cloudy Negative Negative Negative Negative                 Radiology Review: No new  "studies  Other Studies: None    Assessment/Plan       * No active hospital problems. *        Assessment:  1. Complaints of decreased fetal movement. Fetal wellbeing confirmed with reactive nonstress test and spontaneous negative CST.    Plan:  1. Discharged home.  2. Patient counseled regarding fetal kick counts. Signs/symptoms of labor reviewed.  3. Keep regularly scheduled OB office visit.     Total time spent today with Erin  was 20-29 minutes (level 3).  Of this time, > 50% was spent face-to-face time coordinating care, answering her questions and counseling regarding pathophysiology of her presenting problem along with plans for any diagnostic work-up and treatment.        Jose Rivera \"Lulu\" DOYLE Jenkins MD  7/26/2021  19:27 EDT    "

## 2021-08-01 ENCOUNTER — HOSPITAL ENCOUNTER (INPATIENT)
Facility: HOSPITAL | Age: 26
LOS: 3 days | Discharge: HOME | End: 2021-08-04
Attending: OBSTETRICS & GYNECOLOGY | Admitting: OBSTETRICS & GYNECOLOGY

## 2021-08-01 ENCOUNTER — ANESTHESIA (OUTPATIENT)
Dept: LABOR AND DELIVERY | Facility: HOSPITAL | Age: 26
End: 2021-08-01

## 2021-08-01 ENCOUNTER — ANESTHESIA EVENT (OUTPATIENT)
Dept: LABOR AND DELIVERY | Facility: HOSPITAL | Age: 26
End: 2021-08-01

## 2021-08-01 PROBLEM — Z37.9 NORMAL LABOR: Status: ACTIVE | Noted: 2021-08-01

## 2021-08-01 LAB
ABO GROUP BLD: NORMAL
BLD GP AB SCN SERPL QL: NEGATIVE
DEPRECATED RDW RBC AUTO: 43.9 FL (ref 37–54)
ERYTHROCYTE [DISTWIDTH] IN BLOOD BY AUTOMATED COUNT: 14 % (ref 12.3–15.4)
HCT VFR BLD AUTO: 37.4 % (ref 34–46.6)
HGB BLD-MCNC: 12.3 G/DL (ref 12–15.9)
MCH RBC QN AUTO: 28.5 PG (ref 26.6–33)
MCHC RBC AUTO-ENTMCNC: 32.9 G/DL (ref 31.5–35.7)
MCV RBC AUTO: 86.8 FL (ref 79–97)
PLATELET # BLD AUTO: 246 10*3/MM3 (ref 140–450)
PMV BLD AUTO: 11.2 FL (ref 6–12)
RBC # BLD AUTO: 4.31 10*6/MM3 (ref 3.77–5.28)
RH BLD: POSITIVE
SARS-COV-2 RDRP RESP QL NAA+PROBE: NORMAL
T&S EXPIRATION DATE: NORMAL
WBC # BLD AUTO: 10.76 10*3/MM3 (ref 3.4–10.8)

## 2021-08-01 PROCEDURE — 59025 FETAL NON-STRESS TEST: CPT

## 2021-08-01 PROCEDURE — 25010000002 FENTANYL CITRATE (PF) 50 MCG/ML SOLUTION: Performed by: ANESTHESIOLOGY

## 2021-08-01 PROCEDURE — 86850 RBC ANTIBODY SCREEN: CPT | Performed by: OBSTETRICS & GYNECOLOGY

## 2021-08-01 PROCEDURE — 87635 SARS-COV-2 COVID-19 AMP PRB: CPT | Performed by: OBSTETRICS & GYNECOLOGY

## 2021-08-01 PROCEDURE — 86901 BLOOD TYPING SEROLOGIC RH(D): CPT | Performed by: OBSTETRICS & GYNECOLOGY

## 2021-08-01 PROCEDURE — 85027 COMPLETE CBC AUTOMATED: CPT | Performed by: OBSTETRICS & GYNECOLOGY

## 2021-08-01 PROCEDURE — 86900 BLOOD TYPING SEROLOGIC ABO: CPT | Performed by: OBSTETRICS & GYNECOLOGY

## 2021-08-01 RX ORDER — ONDANSETRON 2 MG/ML
4 INJECTION INTRAMUSCULAR; INTRAVENOUS EVERY 6 HOURS PRN
Status: DISCONTINUED | OUTPATIENT
Start: 2021-08-01 | End: 2021-08-02 | Stop reason: HOSPADM

## 2021-08-01 RX ORDER — MAGNESIUM CARB/ALUMINUM HYDROX 105-160MG
30 TABLET,CHEWABLE ORAL ONCE
Status: DISCONTINUED | OUTPATIENT
Start: 2021-08-01 | End: 2021-08-02 | Stop reason: HOSPADM

## 2021-08-01 RX ORDER — ONDANSETRON 4 MG/1
4 TABLET, FILM COATED ORAL EVERY 6 HOURS PRN
Status: DISCONTINUED | OUTPATIENT
Start: 2021-08-01 | End: 2021-08-02 | Stop reason: HOSPADM

## 2021-08-01 RX ORDER — FAMOTIDINE 10 MG/ML
20 INJECTION, SOLUTION INTRAVENOUS ONCE AS NEEDED
Status: COMPLETED | OUTPATIENT
Start: 2021-08-01 | End: 2021-08-02

## 2021-08-01 RX ORDER — EPHEDRINE SULFATE 5 MG/ML
10 INJECTION INTRAVENOUS
Status: DISCONTINUED | OUTPATIENT
Start: 2021-08-01 | End: 2021-08-02 | Stop reason: HOSPADM

## 2021-08-01 RX ORDER — ACETAMINOPHEN 325 MG/1
650 TABLET ORAL EVERY 4 HOURS PRN
Status: DISCONTINUED | OUTPATIENT
Start: 2021-08-01 | End: 2021-08-02 | Stop reason: HOSPADM

## 2021-08-01 RX ORDER — SODIUM CHLORIDE 0.9 % (FLUSH) 0.9 %
10 SYRINGE (ML) INJECTION AS NEEDED
Status: DISCONTINUED | OUTPATIENT
Start: 2021-08-01 | End: 2021-08-02 | Stop reason: HOSPADM

## 2021-08-01 RX ORDER — SODIUM CHLORIDE, SODIUM LACTATE, POTASSIUM CHLORIDE, CALCIUM CHLORIDE 600; 310; 30; 20 MG/100ML; MG/100ML; MG/100ML; MG/100ML
125 INJECTION, SOLUTION INTRAVENOUS CONTINUOUS
Status: DISCONTINUED | OUTPATIENT
Start: 2021-08-01 | End: 2021-08-02

## 2021-08-01 RX ORDER — PROMETHAZINE HYDROCHLORIDE 12.5 MG/1
12.5 TABLET ORAL EVERY 6 HOURS PRN
Status: DISCONTINUED | OUTPATIENT
Start: 2021-08-01 | End: 2021-08-02 | Stop reason: HOSPADM

## 2021-08-01 RX ORDER — LIDOCAINE HYDROCHLORIDE 10 MG/ML
5 INJECTION, SOLUTION EPIDURAL; INFILTRATION; INTRACAUDAL; PERINEURAL AS NEEDED
Status: DISCONTINUED | OUTPATIENT
Start: 2021-08-01 | End: 2021-08-02 | Stop reason: HOSPADM

## 2021-08-01 RX ORDER — TRISODIUM CITRATE DIHYDRATE AND CITRIC ACID MONOHYDRATE 500; 334 MG/5ML; MG/5ML
30 SOLUTION ORAL ONCE
Status: DISCONTINUED | OUTPATIENT
Start: 2021-08-02 | End: 2021-08-02 | Stop reason: HOSPADM

## 2021-08-01 RX ORDER — ERYTHROMYCIN 5 MG/G
OINTMENT OPHTHALMIC
Status: DISCONTINUED
Start: 2021-08-01 | End: 2021-08-04 | Stop reason: HOSPADM

## 2021-08-01 RX ORDER — ONDANSETRON 2 MG/ML
4 INJECTION INTRAMUSCULAR; INTRAVENOUS ONCE AS NEEDED
Status: DISCONTINUED | OUTPATIENT
Start: 2021-08-01 | End: 2021-08-02 | Stop reason: HOSPADM

## 2021-08-01 RX ORDER — DIPHENHYDRAMINE HYDROCHLORIDE 50 MG/ML
12.5 INJECTION INTRAMUSCULAR; INTRAVENOUS EVERY 8 HOURS PRN
Status: DISCONTINUED | OUTPATIENT
Start: 2021-08-01 | End: 2021-08-02 | Stop reason: HOSPADM

## 2021-08-01 RX ORDER — EPHEDRINE SULFATE 5 MG/ML
INJECTION INTRAVENOUS
Status: DISCONTINUED
Start: 2021-08-01 | End: 2021-08-04 | Stop reason: HOSPADM

## 2021-08-01 RX ORDER — SODIUM CHLORIDE 0.9 % (FLUSH) 0.9 %
10 SYRINGE (ML) INJECTION EVERY 12 HOURS SCHEDULED
Status: DISCONTINUED | OUTPATIENT
Start: 2021-08-01 | End: 2021-08-02 | Stop reason: HOSPADM

## 2021-08-01 RX ORDER — OXYTOCIN-SODIUM CHLORIDE 0.9% IV SOLN 30 UNIT/500ML 30-0.9/5 UT/ML-%
SOLUTION INTRAVENOUS
Status: COMPLETED
Start: 2021-08-01 | End: 2021-08-02

## 2021-08-01 RX ADMIN — SODIUM CHLORIDE, POTASSIUM CHLORIDE, SODIUM LACTATE AND CALCIUM CHLORIDE 1000 ML: 600; 310; 30; 20 INJECTION, SOLUTION INTRAVENOUS at 22:48

## 2021-08-01 RX ADMIN — BUPIVACAINE HYDROCHLORIDE 12 ML: 2.5 INJECTION, SOLUTION EPIDURAL; INFILTRATION; INTRACAUDAL; PERINEURAL at 23:46

## 2021-08-01 RX ADMIN — FENTANYL CITRATE 100 MCG: 50 INJECTION, SOLUTION INTRAMUSCULAR; INTRAVENOUS at 23:46

## 2021-08-01 RX ADMIN — Medication 15 ML/HR: at 23:51

## 2021-08-01 RX ADMIN — LIDOCAINE HYDROCHLORIDE AND EPINEPHRINE 2 ML: 15; 5 INJECTION, SOLUTION EPIDURAL at 23:45

## 2021-08-01 RX ADMIN — LIDOCAINE HYDROCHLORIDE AND EPINEPHRINE 3 ML: 15; 5 INJECTION, SOLUTION EPIDURAL at 23:44

## 2021-08-02 PROBLEM — Z37.9 NORMAL LABOR: Status: RESOLVED | Noted: 2021-08-01 | Resolved: 2021-08-02

## 2021-08-02 PROCEDURE — C1755 CATHETER, INTRASPINAL: HCPCS

## 2021-08-02 PROCEDURE — 63710000001 ONDANSETRON PER 8 MG: Performed by: OBSTETRICS & GYNECOLOGY

## 2021-08-02 PROCEDURE — 51703 INSERT BLADDER CATH COMPLEX: CPT

## 2021-08-02 PROCEDURE — C1755 CATHETER, INTRASPINAL: HCPCS | Performed by: ANESTHESIOLOGY

## 2021-08-02 PROCEDURE — 59025 FETAL NON-STRESS TEST: CPT

## 2021-08-02 PROCEDURE — 25010000002 ROPIVACAINE PER 1 MG: Performed by: ANESTHESIOLOGY

## 2021-08-02 RX ORDER — MORPHINE SULFATE 2 MG/ML
2 INJECTION, SOLUTION INTRAMUSCULAR; INTRAVENOUS
Status: DISCONTINUED | OUTPATIENT
Start: 2021-08-02 | End: 2021-08-02 | Stop reason: HOSPADM

## 2021-08-02 RX ORDER — ACETAMINOPHEN 325 MG/1
650 TABLET ORAL EVERY 4 HOURS PRN
Status: DISCONTINUED | OUTPATIENT
Start: 2021-08-02 | End: 2021-08-04 | Stop reason: HOSPADM

## 2021-08-02 RX ORDER — LIDOCAINE HYDROCHLORIDE AND EPINEPHRINE 20; 5 MG/ML; UG/ML
INJECTION, SOLUTION EPIDURAL; INFILTRATION; INTRACAUDAL; PERINEURAL AS NEEDED
Status: DISCONTINUED | OUTPATIENT
Start: 2021-08-02 | End: 2021-08-02 | Stop reason: SURG

## 2021-08-02 RX ORDER — FENTANYL CITRATE 50 UG/ML
INJECTION, SOLUTION INTRAMUSCULAR; INTRAVENOUS AS NEEDED
Status: DISCONTINUED | OUTPATIENT
Start: 2021-08-01 | End: 2021-08-02 | Stop reason: SURG

## 2021-08-02 RX ORDER — OXYTOCIN-SODIUM CHLORIDE 0.9% IV SOLN 30 UNIT/500ML 30-0.9/5 UT/ML-%
2-20 SOLUTION INTRAVENOUS
Status: DISCONTINUED | OUTPATIENT
Start: 2021-08-02 | End: 2021-08-02

## 2021-08-02 RX ORDER — LIDOCAINE HYDROCHLORIDE AND EPINEPHRINE 15; 5 MG/ML; UG/ML
INJECTION, SOLUTION EPIDURAL AS NEEDED
Status: DISCONTINUED | OUTPATIENT
Start: 2021-08-01 | End: 2021-08-02 | Stop reason: SURG

## 2021-08-02 RX ORDER — BISACODYL 10 MG
10 SUPPOSITORY, RECTAL RECTAL DAILY PRN
Status: DISCONTINUED | OUTPATIENT
Start: 2021-08-03 | End: 2021-08-04 | Stop reason: HOSPADM

## 2021-08-02 RX ORDER — PROMETHAZINE HYDROCHLORIDE 12.5 MG/1
12.5 TABLET ORAL EVERY 6 HOURS PRN
Status: DISCONTINUED | OUTPATIENT
Start: 2021-08-02 | End: 2021-08-02 | Stop reason: HOSPADM

## 2021-08-02 RX ORDER — SIMETHICONE 80 MG
80 TABLET,CHEWABLE ORAL 4 TIMES DAILY PRN
Status: DISCONTINUED | OUTPATIENT
Start: 2021-08-02 | End: 2021-08-04 | Stop reason: HOSPADM

## 2021-08-02 RX ORDER — HYDROCORTISONE 25 MG/G
1 CREAM TOPICAL AS NEEDED
Status: DISCONTINUED | OUTPATIENT
Start: 2021-08-02 | End: 2021-08-04 | Stop reason: HOSPADM

## 2021-08-02 RX ORDER — ROPIVACAINE HYDROCHLORIDE 2 MG/ML
15 INJECTION, SOLUTION EPIDURAL; INFILTRATION; PERINEURAL CONTINUOUS
Status: DISCONTINUED | OUTPATIENT
Start: 2021-08-02 | End: 2021-08-02

## 2021-08-02 RX ORDER — OXYTOCIN-SODIUM CHLORIDE 0.9% IV SOLN 30 UNIT/500ML 30-0.9/5 UT/ML-%
85 SOLUTION INTRAVENOUS ONCE
Status: COMPLETED | OUTPATIENT
Start: 2021-08-02 | End: 2021-08-02

## 2021-08-02 RX ORDER — LANOLIN
CREAM (ML) TOPICAL
Status: DISCONTINUED | OUTPATIENT
Start: 2021-08-02 | End: 2021-08-04 | Stop reason: HOSPADM

## 2021-08-02 RX ORDER — ONDANSETRON 2 MG/ML
4 INJECTION INTRAMUSCULAR; INTRAVENOUS EVERY 6 HOURS PRN
Status: DISCONTINUED | OUTPATIENT
Start: 2021-08-02 | End: 2021-08-04 | Stop reason: HOSPADM

## 2021-08-02 RX ORDER — ACETAMINOPHEN 325 MG/1
650 TABLET ORAL EVERY 4 HOURS PRN
Status: DISCONTINUED | OUTPATIENT
Start: 2021-08-02 | End: 2021-08-02 | Stop reason: HOSPADM

## 2021-08-02 RX ORDER — OXYCODONE HYDROCHLORIDE AND ACETAMINOPHEN 5; 325 MG/1; MG/1
2 TABLET ORAL EVERY 4 HOURS PRN
Status: DISCONTINUED | OUTPATIENT
Start: 2021-08-02 | End: 2021-08-02 | Stop reason: HOSPADM

## 2021-08-02 RX ORDER — OXYTOCIN-SODIUM CHLORIDE 0.9% IV SOLN 30 UNIT/500ML 30-0.9/5 UT/ML-%
650 SOLUTION INTRAVENOUS ONCE
Status: DISCONTINUED | OUTPATIENT
Start: 2021-08-02 | End: 2021-08-02 | Stop reason: HOSPADM

## 2021-08-02 RX ORDER — PRENATAL VIT/IRON FUM/FOLIC AC 27MG-0.8MG
1 TABLET ORAL DAILY
Status: DISCONTINUED | OUTPATIENT
Start: 2021-08-02 | End: 2021-08-04 | Stop reason: HOSPADM

## 2021-08-02 RX ORDER — SODIUM CHLORIDE 0.9 % (FLUSH) 0.9 %
1-10 SYRINGE (ML) INJECTION AS NEEDED
Status: DISCONTINUED | OUTPATIENT
Start: 2021-08-02 | End: 2021-08-04 | Stop reason: HOSPADM

## 2021-08-02 RX ORDER — PROMETHAZINE HYDROCHLORIDE 12.5 MG/1
12.5 SUPPOSITORY RECTAL EVERY 6 HOURS PRN
Status: DISCONTINUED | OUTPATIENT
Start: 2021-08-02 | End: 2021-08-02 | Stop reason: HOSPADM

## 2021-08-02 RX ORDER — ROPIVACAINE HYDROCHLORIDE 2 MG/ML
15 INJECTION, SOLUTION EPIDURAL; INFILTRATION; PERINEURAL CONTINUOUS
Status: DISCONTINUED | OUTPATIENT
Start: 2021-08-02 | End: 2021-08-02 | Stop reason: SDUPTHER

## 2021-08-02 RX ORDER — IBUPROFEN 600 MG/1
600 TABLET ORAL EVERY 6 HOURS PRN
Status: DISCONTINUED | OUTPATIENT
Start: 2021-08-02 | End: 2021-08-02 | Stop reason: HOSPADM

## 2021-08-02 RX ORDER — BUPIVACAINE HYDROCHLORIDE 2.5 MG/ML
INJECTION, SOLUTION EPIDURAL; INFILTRATION; INTRACAUDAL AS NEEDED
Status: DISCONTINUED | OUTPATIENT
Start: 2021-08-01 | End: 2021-08-02 | Stop reason: SURG

## 2021-08-02 RX ORDER — IBUPROFEN 600 MG/1
600 TABLET ORAL EVERY 6 HOURS PRN
Status: DISCONTINUED | OUTPATIENT
Start: 2021-08-02 | End: 2021-08-04 | Stop reason: HOSPADM

## 2021-08-02 RX ORDER — DOCUSATE SODIUM 100 MG/1
100 CAPSULE, LIQUID FILLED ORAL 2 TIMES DAILY
Status: DISCONTINUED | OUTPATIENT
Start: 2021-08-02 | End: 2021-08-04 | Stop reason: HOSPADM

## 2021-08-02 RX ORDER — CALCIUM CARBONATE 200(500)MG
2 TABLET,CHEWABLE ORAL 3 TIMES DAILY PRN
Status: DISCONTINUED | OUTPATIENT
Start: 2021-08-02 | End: 2021-08-02

## 2021-08-02 RX ORDER — ONDANSETRON 4 MG/1
4 TABLET, FILM COATED ORAL EVERY 6 HOURS PRN
Status: DISCONTINUED | OUTPATIENT
Start: 2021-08-02 | End: 2021-08-04 | Stop reason: HOSPADM

## 2021-08-02 RX ADMIN — DOCUSATE SODIUM 100 MG: 100 CAPSULE, LIQUID FILLED ORAL at 20:49

## 2021-08-02 RX ADMIN — ANTACID TABLETS 2 TABLET: 500 TABLET, CHEWABLE ORAL at 02:19

## 2021-08-02 RX ADMIN — ACETAMINOPHEN 650 MG: 325 TABLET ORAL at 20:49

## 2021-08-02 RX ADMIN — OXYTOCIN 85 ML/HR: 10 INJECTION INTRAVENOUS at 14:20

## 2021-08-02 RX ADMIN — EPHEDRINE SULFATE 10 MG: 5 INJECTION INTRAVENOUS at 02:39

## 2021-08-02 RX ADMIN — FAMOTIDINE 20 MG: 10 INJECTION, SOLUTION INTRAVENOUS at 01:52

## 2021-08-02 RX ADMIN — OXYTOCIN-SODIUM CHLORIDE 0.9% IV SOLN 30 UNIT/500ML 2 MILLI-UNITS/MIN: 30-0.9/5 SOLUTION at 10:13

## 2021-08-02 RX ADMIN — WITCH HAZEL 1 PAD: 500 SOLUTION RECTAL; TOPICAL at 16:17

## 2021-08-02 RX ADMIN — SODIUM CHLORIDE, POTASSIUM CHLORIDE, SODIUM LACTATE AND CALCIUM CHLORIDE 125 ML/HR: 600; 310; 30; 20 INJECTION, SOLUTION INTRAVENOUS at 10:14

## 2021-08-02 RX ADMIN — ONDANSETRON HYDROCHLORIDE 4 MG: 4 TABLET, FILM COATED ORAL at 00:52

## 2021-08-02 RX ADMIN — Medication: at 16:17

## 2021-08-02 RX ADMIN — LIDOCAINE HYDROCHLORIDE,EPINEPHRINE BITARTRATE 5 MG: 20; .005 INJECTION, SOLUTION EPIDURAL; INFILTRATION; INTRACAUDAL; PERINEURAL at 12:57

## 2021-08-02 RX ADMIN — ROPIVACAINE HYDROCHLORIDE 15 ML/HR: 2 INJECTION, SOLUTION EPIDURAL; INFILTRATION at 13:12

## 2021-08-02 RX ADMIN — Medication: at 16:16

## 2021-08-02 RX ADMIN — BENZOCAINE 1 APPLICATION: 5.6 OINTMENT TOPICAL at 16:17

## 2021-08-02 RX ADMIN — OXYTOCIN 2 MILLI-UNITS/MIN: 10 INJECTION INTRAVENOUS at 10:13

## 2021-08-02 RX ADMIN — IBUPROFEN 600 MG: 600 TABLET ORAL at 16:16

## 2021-08-02 NOTE — L&D DELIVERY NOTE
[facility]  Vaginal Delivery Note    Delivery     Delivery: Vaginal Delivery, unspecified     YOB: 2019    Time of Birth:  Gestational Age 1:46 PM   37w0d     Anesthesia: Epidural     Delivering clinician: Sonya Marie   Forceps?   No   Vacuum? No    Shoulder dystocia present: No        Delivery narrative:  Uncomplicated  over an intact perineum.  Anterior shoulder delivered with ease.  Infant vigorous at delivery so placed on maternal abdomen.  Delayed cord clamping x 1 min.  Cord doubly clamped and cut by FOB.  Placenta delivered intact with gentle cord traction.     Infant    Findings: female  infant     Infant observations: Weight: 3405 g (7 lb 8.1 oz)   Length: 19  in  Observations/Comments:        Apgars: 8  @ 1 minute /    9  @ 5 minutes   Infant Name: Emiliano     Placenta, Cord, and Fluid    Placenta delivered  Spontaneous  at   2021  1:51 PM     Cord: 3 vessels  present.   Nuchal Cord?  no   Cord blood obtained: Yes    Cord gases obtained:  No    Cord gas results: Venous:  No results found for: PHCVEN    Arterial:  No results found for: PHCART     Repair    Episiotomy: None     No    Lacerations: No   Estimated Blood Loss:  300mL           Complications  none    Disposition  Mother to Mother Baby/Postpartum  in stable condition currently.  Baby to remains with mom  in stable condition currently.      Sonya Montiel MD  21  16:30 EDT

## 2021-08-02 NOTE — LACTATION NOTE
Patient said baby breast fed very well after delivery.  She said she primarily pumped for her first child.  She was advised to attempt breastfeeding every 3 hours and on demand, and to ask for assistance, if needed.  She said she has a home Spectra pump and was encouraged to have it brought to the hospital.

## 2021-08-02 NOTE — H&P
Amy  Obstetric History and Physical    Chief Complaint   Patient presents with   • Contractions       Subjective     Patient is a 26 y.o. female  currently at 37w0d, who presents for term labor  without ROM.    Her prenatal care is benign.  Her previous obstetric/gynecological history is noted for is non-contributory.    The following portions of the patients history were reviewed and updated as appropriate: current medications, allergies, past medical history, past surgical history, past family history, past social history and problem list .       Prenatal Information:   Maternal Prenatal Labs  Blood Type ABO Type   Date Value Ref Range Status   2021 A  Final      Rh Status RH type   Date Value Ref Range Status   2021 Positive  Final      Antibody Screen Antibody Screen   Date Value Ref Range Status   2021 Negative  Final                        Past OB History:       OB History    Para Term  AB Living   2 1 1 0 0 1   SAB TAB Ectopic Molar Multiple Live Births   0 0 0 0 0 1      # Outcome Date GA Lbr Dwight/2nd Weight Sex Delivery Anes PTL Lv   2 Current            1 Term 11 38w0d 20:04 / :23 3110 g (6 lb 13.7 oz) F Vag-Spont EPI N ZO      Complications: Chorioamnionitis      Name: MONIE BAUGHALEJANDRA      Apgar1: 8  Apgar5: 9       Past Medical History: Past Medical History:   Diagnosis Date   • Kidney stone       Past Surgical History History reviewed. No pertinent surgical history.   Family History: History reviewed. No pertinent family history.   Social History:  reports that she has never smoked. She has never used smokeless tobacco.   reports no history of alcohol use.   reports no history of drug use.        REVIEW OF SYSTEMS              Reports fetal movement is normal             Denies leakage of amniotic fluid.             Denies vaginal bleeding             She reports Regular contractions, frequency: Every 4 minutes, intensity strong             All  other systems reviewed and are negative    Objective       Vital Signs Range for the last 24 hours  Temperature: Temp:  [98.2 °F (36.8 °C)-99 °F (37.2 °C)] 99 °F (37.2 °C)   Temp Source: Temp src: Oral   BP: BP: ()/(48-82) 95/55   Pulse: Heart Rate:  [] 87   Respirations: Resp:  [18] 18   SPO2: SpO2:  [93 %] 93 %   O2 Amount (l/min):     O2 Devices     Weight: Weight:  [82.1 kg (181 lb)] 82.1 kg (181 lb)       Presentation: vtx   Cervix: Exam by: Method: sterile exam per RN   Dilation: Cervical Dilation (cm): 5   Effacement: Cervical Effacement: 80%   Station: Fetal Station: -1      AROM w/ clear fluid   Fetal Heart Rate Assessment   Method: Fetal HR Assessment Method: external   Beats/min: Fetal HR (beats/min): 135   Baseline: Fetal Heart Baseline Rate: normal range   Varibility: Fetal HR Variability: moderate (amplitude range 6 to 25 bpm)   Accels: Fetal HR Accelerations: greater than/equal to 15 bpm   Decels: Fetal HR Decelerations: absent   Tracing Category:  1     Uterine Assessment   Method: Method: external tocotransducer   Frequency (min): Contraction Frequency (Minutes): 2-4   Ctx Count in 10 min: Contractions in 10 Minutes: X2   Duration:     Intensity: Contraction Intensity: moderate by palpation   Intensity by IUPC:     Resting Tone: Uterine Resting Tone: soft by palpation   Resting Tone by IUPC:     [place] Units:       Constitutional:  Well developed, well nourished, no acute distress, well-groomed.   Respiratory:  Lungs are clear to auscultation bilaterally, normal breath sounds.   Cardiovascular:  Normal rate and rhythm, no murmurs.   Gastrointestinal:  Soft, gravid, nontender.  Uterus: Soft, nontender. Fundus appropriate for dates.  Neurologic:  Alert & oriented x 3,  no focal deficits noted.   Psychiatric:  Speech and behavior appropriate.   Extremities: no cyanosis, clubbing or edema, no evidence of DVT.        Labs:  Lab Results   Component Value Date    WBC 10.76 08/01/2021    HGB  12.3 08/01/2021    HCT 37.4 08/01/2021    MCV 86.8 08/01/2021     08/01/2021    URICACID 4.2 07/21/2021    AST 31 07/21/2021    ALT 37 (H) 07/21/2021     (H) 07/21/2021     Results from last 7 days   Lab Units 08/01/21  1574   ABO TYPING  A   RH TYPING  Positive   ANTIBODY SCREEN  Negative           Assessment/Plan       Normal labor        Assessment:  1.  Intrauterine pregnancy at 37w0d weeks gestation with reactive fetal status.    2.   term labor  without ROM  3.  Obstetrical history significant for is non-contributory.  4.  GBS status: neg    Plan:  1.Expectant management.  Pitocin augmentation prn.  Epidural in place  2. Plan of care has been reviewed with patient and questions answered.  3.  Risks, benefits of treatment plan have been discussed.  4.  All questions have been answered.        Sonya Montiel MD  8/2/2021  08:41 EDT

## 2021-08-02 NOTE — ANESTHESIA PROCEDURE NOTES
Labor Epidural      Patient reassessed immediately prior to procedure    Patient location during procedure: OB  Performed By  Anesthesiologist: Kosta Ramos DO  Preanesthetic Checklist  Completed: patient identified, IV checked, site marked, risks and benefits discussed, surgical consent, monitors and equipment checked, pre-op evaluation and timeout performed  Prep:  Pt Position:sitting  Sterile Tech:gloves, mask, sterile barrier and cap  Prep:chlorhexidine gluconate and isopropyl alcohol  Monitoring:blood pressure monitoring and continuous pulse oximetry  Epidural Block Procedure:  Approach:midline  Guidance:landmark technique and palpation technique  Location:L3-L4  Needle Type:Tuohy  Needle Gauge:17 G  Loss of Resistance Medium: air  Loss of Resistance: 6cm  Cath Depth at skin:11 cm  Paresthesia: none  Aspiration:negative  Test Dose:negative  Number of Attempts: 1  Post Assessment:  Dressing:secured with tape and occlusive dressing applied (Tegaderm Placed)  Pt Tolerance:patient tolerated the procedure well with no apparent complications  Complications:no

## 2021-08-02 NOTE — PROGRESS NOTES
"08/02/21  12:52 EDT  Erin Mckeon    SUBJECTIVE: comfortable w/ epidural     ASSESSMENTS:     /57   Pulse 102   Temp 98.9 °F (37.2 °C)   Resp 18   Ht 152.4 cm (60\")   Wt 82.1 kg (181 lb)   SpO2 93%   Breastfeeding No   BMI 35.35 kg/m²     Fetal Heart Rate Assessment   Method: Fetal HR Assessment Method: external   Beats/min: Fetal HR (beats/min): 135   Baseline: Fetal Heart Baseline Rate: normal range   Varibility: Fetal HR Variability: moderate (amplitude range 6 to 25 bpm)   Accels: Fetal HR Accelerations: absent   Decels: Fetal HR Decelerations: variable, early   Tracing Category:  2     Uterine Assessment   Method: Method: external tocotransducer   Frequency (min): Contraction Frequency (Minutes): 2-4   Ctx Count in 10 min: Contractions in 10 Minutes: X2   Duration:     Intensity: Contraction Intensity: moderate by palpation   Intensity by IUPC:     Resting Tone: Uterine Resting Tone: soft by palpation   Resting Tone by IUPC:       Presentation: vtx   Cervix: Exam by: Method: sterile exam per RN   Dilation: Cervical Dilation (cm): 7   Effacement: Cervical Effacement: 90%   Station: Fetal Station: 0            PLAN:  Continue pitocin augmentation  FHT overall very reassuring w/ moderate variability and accelerations present.  Making cervical change, so continue with augmentation  Repeat SVE 2h or prn    Sonya Montiel MD  12:52 EDT  08/02/21       "

## 2021-08-03 LAB
HCT VFR BLD AUTO: 30.6 % (ref 34–46.6)
HGB BLD-MCNC: 10.1 G/DL (ref 12–15.9)

## 2021-08-03 PROCEDURE — 85018 HEMOGLOBIN: CPT | Performed by: OBSTETRICS & GYNECOLOGY

## 2021-08-03 PROCEDURE — 85014 HEMATOCRIT: CPT | Performed by: OBSTETRICS & GYNECOLOGY

## 2021-08-03 RX ORDER — FERROUS SULFATE 325(65) MG
325 TABLET ORAL 2 TIMES DAILY WITH MEALS
Status: DISCONTINUED | OUTPATIENT
Start: 2021-08-03 | End: 2021-08-04 | Stop reason: HOSPADM

## 2021-08-03 RX ADMIN — SERTRALINE HYDROCHLORIDE 50 MG: 50 TABLET ORAL at 08:59

## 2021-08-03 RX ADMIN — DOCUSATE SODIUM 100 MG: 100 CAPSULE, LIQUID FILLED ORAL at 20:46

## 2021-08-03 RX ADMIN — DOCUSATE SODIUM 100 MG: 100 CAPSULE, LIQUID FILLED ORAL at 08:59

## 2021-08-03 RX ADMIN — IBUPROFEN 600 MG: 600 TABLET ORAL at 09:00

## 2021-08-03 RX ADMIN — FERROUS SULFATE TAB 325 MG (65 MG ELEMENTAL FE) 325 MG: 325 (65 FE) TAB at 18:20

## 2021-08-03 RX ADMIN — PRENATAL VITAMINS-IRON FUMARATE 27 MG IRON-FOLIC ACID 0.8 MG TABLET 1 TABLET: at 08:59

## 2021-08-03 NOTE — ANESTHESIA POSTPROCEDURE EVALUATION
Patient: Erin Mckeon    Procedure Summary     Date: 08/01/21 Room / Location:     Anesthesia Start: 2330 Anesthesia Stop: 08/02/21 1351    Procedure: LABOR ANALGESIA Diagnosis:     Scheduled Providers:  Provider: Kosta Ramos DO    Anesthesia Type: epidural ASA Status: 2          Anesthesia Type: epidural    Vitals  Vitals Value Taken Time   /60 08/03/21 0745   Temp 98.2 °F (36.8 °C) 08/03/21 0745   Pulse 75 08/03/21 0745   Resp 16 08/03/21 0745   SpO2 93 % 08/01/21 2337           Post Anesthesia Care and Evaluation    Patient location during evaluation: bedside  Patient participation: complete - patient participated  Level of consciousness: awake and alert  Pain management: adequate  Airway patency: patent  Anesthetic complications: No anesthetic complications    Cardiovascular status: acceptable  Respiratory status: acceptable  Hydration status: acceptable  Post Neuraxial Block status: Motor and sensory function returned to baseline and No signs or symptoms of PDPH

## 2021-08-03 NOTE — PROGRESS NOTES
[place]  Vaginal Delivery Progress Note    Subjective     Doing well, pain controlled, lochia less than menses, voiding without difficulty. Breastfeeding going well      Objective     Vital Signs Range for the last 24 hours  Temperature: Temp:  [98.2 °F (36.8 °C)-99.6 °F (37.6 °C)] 98.2 °F (36.8 °C)   Temp Source: Temp src: Oral   BP: BP: ()/(49-84) 107/55   Pulse: Heart Rate:  [] 87   Respirations: Resp:  [16-18] 16   SPO2:     O2 Amount (l/min):     O2 Devices           Physical Exam:  General:  no acute distresss.  Abdomen: Soft, non-tender, fundus firm  Lochia: less than a normal period,  Perineum: is normal  Extremities: normal, atraumatic, no cyanosis, and trace edema.       Lab results reviewed:  Yes    Lab Results   Component Value Date    WBC 10.76 2021    HGB 10.1 (L) 2021    HCT 30.6 (L) 2021    MCV 86.8 2021     2021         Assessment/Plan        (spontaneous vaginal delivery)    Postpartum anemia      Erin Mckeon is Day 1  post-partum       Plan:  Continue current care.  Ferrous sulfate 325mg BID      Shaylee Aguiar CNM  8/3/2021  08:58 EDT

## 2021-08-04 VITALS
WEIGHT: 181 LBS | RESPIRATION RATE: 18 BRPM | DIASTOLIC BLOOD PRESSURE: 68 MMHG | TEMPERATURE: 97.6 F | SYSTOLIC BLOOD PRESSURE: 120 MMHG | OXYGEN SATURATION: 93 % | HEIGHT: 60 IN | BODY MASS INDEX: 35.53 KG/M2 | HEART RATE: 71 BPM

## 2021-08-04 PROBLEM — O41.1290 CHORIOAMNIONITIS: Status: RESOLVED | Noted: 2019-11-03 | Resolved: 2021-08-04

## 2021-08-04 PROBLEM — O47.00 PRETERM UTERINE CONTRACTIONS, ANTEPARTUM: Status: RESOLVED | Noted: 2021-07-22 | Resolved: 2021-08-04

## 2021-08-04 PROBLEM — O28.5 ABNORMAL GENETIC TEST DURING PREGNANCY: Status: RESOLVED | Noted: 2019-06-24 | Resolved: 2021-08-04

## 2021-08-04 PROBLEM — Z34.90 PREGNANCY: Status: RESOLVED | Noted: 2021-07-21 | Resolved: 2021-08-04

## 2021-08-04 RX ORDER — DOCUSATE SODIUM 100 MG/1
100 CAPSULE, LIQUID FILLED ORAL 2 TIMES DAILY PRN
Qty: 60 CAPSULE | Refills: 1 | Status: SHIPPED | OUTPATIENT
Start: 2021-08-04 | End: 2022-07-29

## 2021-08-04 RX ORDER — IBUPROFEN 600 MG/1
600 TABLET ORAL EVERY 6 HOURS PRN
Qty: 60 TABLET | Refills: 1 | Status: SHIPPED | OUTPATIENT
Start: 2021-08-04 | End: 2022-07-29

## 2021-08-04 RX ORDER — FERROUS SULFATE 325(65) MG
325 TABLET ORAL 2 TIMES DAILY WITH MEALS
Qty: 60 TABLET | Refills: 1 | Status: SHIPPED | OUTPATIENT
Start: 2021-08-04 | End: 2022-07-29

## 2021-08-04 RX ADMIN — FERROUS SULFATE TAB 325 MG (65 MG ELEMENTAL FE) 325 MG: 325 (65 FE) TAB at 09:11

## 2021-08-04 RX ADMIN — DOCUSATE SODIUM 100 MG: 100 CAPSULE, LIQUID FILLED ORAL at 09:11

## 2021-08-04 RX ADMIN — SERTRALINE HYDROCHLORIDE 50 MG: 50 TABLET ORAL at 09:11

## 2021-08-04 RX ADMIN — PRENATAL VITAMINS-IRON FUMARATE 27 MG IRON-FOLIC ACID 0.8 MG TABLET 1 TABLET: at 09:11

## 2021-08-04 NOTE — PROGRESS NOTES
8/4/2021  PPD #2    Subjective   Erin feels well.  Patient describes her lochia as less than menses.  Pain is well controlled  She is breastfeeding.      Objective   Temp: Temp:  [97.6 °F (36.4 °C)-98 °F (36.7 °C)] 97.6 °F (36.4 °C) Temp src: Oral   BP: BP: (111-120)/(59-68) 120/68        Pulse: Heart Rate:  [55-77] 71  RR: Resp:  [16-18] 18    General:  No acute distress   Abdomen: Fundus firm and beneath umbilicus   Pelvis: deferred     Lab Results   Component Value Date    WBC 10.76 08/01/2021    HGB 10.1 (L) 08/03/2021    HCT 30.6 (L) 08/03/2021    MCV 86.8 08/01/2021     08/01/2021    HEPBSAG Non-Reactive 02/08/2021    AST 31 07/21/2021    URICACID 4.2 07/21/2021       Assessment  1. PPD# 2 after vaginal delivery  2.   PP anemia    Plan  1. Discharge to home  2. Follow up with LW  in 6 weeks  3. Prescriptions for Motrin, ferrous sulfate, and Colace prescribed and advised on weaning.  4. Advised no tampons, intercourse, or tub baths for 6 weeks.       This note has been electronically signed.    Víctor Godoy CNM  09:26 EDT  August 4, 2021

## 2021-08-04 NOTE — DISCHARGE SUMMARY
[facility]  Vaginal delivery discharge summary      Patient: Erin Mckeon      MR#:[id number]  Admission  Diagnosis:   Patient Active Problem List   Diagnosis   • Abnormal genetic test during pregnancy   •  (normal spontaneous vaginal delivery)   • Chorioamnionitis   • Postpartum anemia         Discharge Diagnosis:    (spontaneous vaginal delivery)   Postpartum anemia         Date of Admission: 2021  Date of Discharge:  2021    Procedures:  Vaginal Delivery, unspecified     2021    1:46 PM      Service:  Obstetrics    Hospital Course:  Patient underwent vaginal delivery and remained in the hospital for 3 days.  During that time she remained afebrile and hemodynamically stable.  On the day of discharge, she was eating, ambulating and voiding without difficulty.  She is breastfeeding.     Labs:    Lab Results   Component Value Date    WBC 10.76 2021    HGB 10.1 (L) 2021    HCT 30.6 (L) 2021    MCV 86.8 2021     2021    URICACID 4.2 2021    AST 31 2021    ALT 37 (H) 2021     (H) 2021     Results from last 7 days   Lab Units 21  2253   ABO TYPING  A   RH TYPING  Positive   ANTIBODY SCREEN  Negative       Discharge Medications     Discharge Medications      New Medications      Instructions Start Date   ibuprofen 600 MG tablet  Commonly known as: ADVIL,MOTRIN   600 mg, Oral, Every 6 Hours PRN         Continue These Medications      Instructions Start Date   FeroSul 325 (65 FE) MG tablet  Generic drug: ferrous sulfate   325 mg, Oral, 2 Times Daily With Meals      Prenatal 27-1 27-1 MG tablet tablet   1 tablet, Oral, Daily      sertraline 50 MG tablet  Commonly known as: ZOLOFT   50 mg, Oral, Daily      Stool Softener Laxative 100 MG capsule  Generic drug: docusate sodium   100 mg, Oral, 2 Times Daily PRN         Stop These Medications    ondansetron ODT 4 MG disintegrating tablet  Commonly known as: ZOFRAN-ODT     ondansetron  ODT 8 MG disintegrating tablet  Commonly known as: ZOFRAN-ODT            Discharge Disposition:  To Home    Discharge Condition:  Stable. PP anemia continuing ferrous sulfate.     Discharge Diet: Regular    Activity at Discharge: Pelvic rest    Follow-up Appointments  Follow up with LW in 6 weeks.     Víctor Godoy CNM  08/04/21  17:45 EDT

## 2021-08-04 NOTE — LACTATION NOTE
08/04/21 1100   Maternal Information   Person Making Referral   (fu consult)   Maternal Reason for Referral   (mom states tender w/feedings but not getting worse)   Maternal Assessment   Breast Size Issue none   Breast Shape Bilateral:;round   Breast Density Bilateral:;soft   Nipples Bilateral:;short   Right Nipple Symptoms tender;redness  (less than yesterday)   Maternal Infant Feeding   Maternal Emotional State independent   Infant Positioning clutch/football  (left)   Signs of Milk Transfer deep jaw excursions noted   Pain with Feeding   (pain less than yesterday)   Comfort Measures Before/During Feeding   (using 24 mm nipple shield)   Latch Assistance none needed   Support Person Involvement verbally supports mother   Milk Expression/Equipment   Breast Pump Type double electric, personal  (has personal pump to use at home)   Breast Pump Flange Type hard   Breast Pump Flange Size 24 mm   Breast Pumping   Breast Pumping Interventions   (pump for missed feeds or feeds <10 min, or if pain persists)

## 2021-08-04 NOTE — LACTATION NOTE
08/03/21 1350   Maternal Information   Person Making Referral nurse;patient   Maternal Reason for Referral latch difficulty  (painful latch; pump 1st x11 mo--would never latch)   Maternal Assessment   Breast Size Issue none   Breast Shape Bilateral:;round   Breast Density Bilateral:;soft   Nipples Bilateral:;short   Left Nipple Symptoms abraded;tender;redness   Right Nipple Symptoms tender;redness   Maternal Infant Feeding   Maternal Emotional State receptive;tense   Infant Positioning clutch/football  (left)   Signs of Milk Transfer deep jaw excursions noted   Pain with Feeding   (pain less with deeper latch)   Comfort Measures Before/During Feeding infant position adjusted;latch adjusted;maternal position adjusted  (tried small nipple shield but didn't make it better)   Latch Assistance minimal assistance   Milk Expression/Equipment   Breast Pump Type double electric, personal  (demonstrated personal pump)   Breast Pump Flange Type hard   Breast Pump Flange Size 24 mm   Breast Pumping   Breast Pumping Interventions   (pump for missed feeds and if feeds <10 minutes)   Helped mom with position and latch and mom will sork on these things. Teaching done as documented under Education. To call lactation services, if there are questions or concerns or if mom wants help with a feeding. Encouraged as much skin to skin as possible.

## 2022-07-15 ENCOUNTER — TELEPHONE (OUTPATIENT)
Dept: OBSTETRICS AND GYNECOLOGY | Facility: CLINIC | Age: 27
End: 2022-07-15

## 2022-07-15 DIAGNOSIS — Z34.90 PREGNANCY, UNSPECIFIED GESTATIONAL AGE: Primary | ICD-10-CM

## 2022-07-15 NOTE — TELEPHONE ENCOUNTER
LMP 6/4/22. Reports regular periods every 28 days. States she had a negative UPT 6/29 and a positive 7/2. Informed appears to match up with LMP. Would recommend appointment the first week of August. PtMukesh CALLES, she requests to see Dr. Aquino. Scheduled 8/2

## 2022-07-28 ENCOUNTER — TELEPHONE (OUTPATIENT)
Dept: OBSTETRICS AND GYNECOLOGY | Facility: CLINIC | Age: 27
End: 2022-07-28

## 2022-07-28 NOTE — TELEPHONE ENCOUNTER
Denies any recent intercourse, straining with BM or lifting. US tomorrow. Her NOB with is Jolanta and she will being seeing Dr. Eugene

## 2022-07-29 ENCOUNTER — OFFICE VISIT (OUTPATIENT)
Dept: OBSTETRICS AND GYNECOLOGY | Facility: CLINIC | Age: 27
End: 2022-07-29

## 2022-07-29 VITALS
BODY MASS INDEX: 34.95 KG/M2 | SYSTOLIC BLOOD PRESSURE: 126 MMHG | WEIGHT: 178 LBS | HEIGHT: 60 IN | DIASTOLIC BLOOD PRESSURE: 70 MMHG

## 2022-07-29 DIAGNOSIS — O20.0 THREATENED ABORTION: Primary | ICD-10-CM

## 2022-07-29 PROCEDURE — 99213 OFFICE O/P EST LOW 20 MIN: CPT | Performed by: NURSE PRACTITIONER

## 2022-07-29 RX ORDER — SERTRALINE HYDROCHLORIDE 100 MG/1
150 TABLET, FILM COATED ORAL DAILY
COMMUNITY
End: 2022-10-25 | Stop reason: DRUGHIGH

## 2022-07-29 NOTE — PROGRESS NOTES
"    Chief Complaint   Patient presents with   • Threatened Miscarriage          HPI  Erin Mckeon is a 27 y.o. female, , who presents with bleeding.  The amount of bleeding is described as light  for 7 days with clots starting on 2022. Patient stated that she had intercourse on 2022 and she experienced light bleeding. Patient stated that the bleeding stopped but returned on 2022. Patient stated that the bleeding has not stopped since 2022. Patient denies any cramping.    Recent Tests:  She had a urine pregnancy test that was done 3 weeks ago that was positive. Patient stated that the date was 2022.  US today: Yes.  Findings showed likely nonviable pregnancy measuring 6w2d and no FHR.  I have personally evaluated the U/S and agree with the findings. Melisa Apple, STEPHAN  She has not had prenatal care.  She denies associated abdominal or pelvic pain.. Her past medical history is non-contributory.  She does not have passage of tissue.  Rh Status: Positive  She reports no additional symptoms or complaints.    The additional following portions of the patient's history were reviewed and updated as appropriate: allergies and current medications.    Review of Systems   Constitutional: Negative.    Respiratory: Negative.    Cardiovascular: Negative.    Gastrointestinal: Negative.    Genitourinary: Positive for vaginal bleeding.     All other systems reviewed and are negative.     I have reviewed and agree with the HPI, ROS, and historical information as entered above. Melisa Apple, APRN    Objective   /70   Ht 152.4 cm (60\")   Wt 80.7 kg (178 lb)   Breastfeeding No   BMI 34.76 kg/m²     Physical Exam  Constitutional:       Appearance: Normal appearance.   Pulmonary:      Effort: Pulmonary effort is normal.   Neurological:      Mental Status: She is alert.            Assessment and Plan    Problem List Items Addressed This Visit    None     Visit Diagnoses     Threatened "     -  Primary    Relevant Orders    US Ob Transvaginal        U/S today reveals IUP measuring 6w2d with no FHR. Size is not consistent with dates and she is sure of LMP. RH+ Discussed likely miscarriage but need 1 week F/U to confirm nonviable.     1. Threatened AB  2. Repeat U/S in 1 week(s).  3. Pelvic Rest.  No douching, intercourse or use of tampons.  4. Report to ED if saturating a pad greater than every 30-60 mins.  Return in about 1 week (around 2022) for U/S KS or TH.   Options reviewed for if she does not pass sac before follow up. She will likely desire cytotec.     Counseling was given to patient for the following topics: diagnostic results, instructions for management, prognosis and importance of treatment compliance . Total time of the encounter was 20 minutes and 10 minutes was spend counseling.      Melisa Apple, APRN  2022

## 2022-09-12 ENCOUNTER — TELEPHONE (OUTPATIENT)
Dept: OBSTETRICS AND GYNECOLOGY | Facility: CLINIC | Age: 27
End: 2022-09-12

## 2022-09-12 DIAGNOSIS — Z87.59 HISTORY OF MISCARRIAGE: Primary | ICD-10-CM

## 2022-09-12 NOTE — TELEPHONE ENCOUNTER
PATIENT STATES SHE HAD A MISCARRIAGE ON 7/30/2022, HAS NOT HAD A PERIOD, HAD A POSITIVE PREGNANCY TEST ON 8/27/2022. WOULD LIKE TO HAVE LABS DRAWN TO DETERMINE HOW FAR ALONG AND TO SCHEDULE A NEW OB WITH DR IBANEZ.    CALL BACK [phone]

## 2022-09-12 NOTE — TELEPHONE ENCOUNTER
She was worked in 7/29/22 for TAB. US revealed IUP with no FHR. Rh Positive. I do not see any follow up. She has not had a period since then and has a positive UPT. Come in for hcg and prog. She wants to have them drawn at [facility].

## 2022-09-13 ENCOUNTER — LAB (OUTPATIENT)
Dept: LAB | Facility: HOSPITAL | Age: 27
End: 2022-09-13

## 2022-09-13 DIAGNOSIS — Z87.59 HISTORY OF MISCARRIAGE: ICD-10-CM

## 2022-09-13 LAB
HCG INTACT+B SERPL-ACNC: 1342 MIU/ML
PROGEST SERPL-MCNC: 7.22 NG/ML

## 2022-09-13 PROCEDURE — 84144 ASSAY OF PROGESTERONE: CPT

## 2022-09-13 PROCEDURE — 36415 COLL VENOUS BLD VENIPUNCTURE: CPT

## 2022-09-13 PROCEDURE — 84702 CHORIONIC GONADOTROPIN TEST: CPT

## 2022-09-14 ENCOUNTER — TELEPHONE (OUTPATIENT)
Dept: OBSTETRICS AND GYNECOLOGY | Facility: CLINIC | Age: 27
End: 2022-09-14

## 2022-09-14 DIAGNOSIS — Z34.90 PREGNANCY, UNSPECIFIED GESTATIONAL AGE: ICD-10-CM

## 2022-09-14 DIAGNOSIS — Z87.59 HISTORY OF MISCARRIAGE: Primary | ICD-10-CM

## 2022-09-15 ENCOUNTER — TELEPHONE (OUTPATIENT)
Dept: OBSTETRICS AND GYNECOLOGY | Facility: CLINIC | Age: 27
End: 2022-09-15

## 2022-09-15 DIAGNOSIS — Z87.59 HISTORY OF MISCARRIAGE: ICD-10-CM

## 2022-09-15 DIAGNOSIS — Z34.90 PREGNANCY, UNSPECIFIED GESTATIONAL AGE: ICD-10-CM

## 2022-09-15 DIAGNOSIS — O20.0 THREATENED ABORTION: ICD-10-CM

## 2022-09-15 PROCEDURE — 36415 COLL VENOUS BLD VENIPUNCTURE: CPT | Performed by: OBSTETRICS & GYNECOLOGY

## 2022-09-15 PROCEDURE — 84702 CHORIONIC GONADOTROPIN TEST: CPT | Performed by: OBSTETRICS & GYNECOLOGY

## 2022-09-15 NOTE — TELEPHONE ENCOUNTER
Stated they were unable to get the Rx asked if could be sent to another pharmacy or if wanting to [facility]

## 2022-09-16 ENCOUNTER — LAB (OUTPATIENT)
Dept: LAB | Facility: HOSPITAL | Age: 27
End: 2022-09-16

## 2022-09-17 LAB — HCG INTACT+B SERPL-ACNC: 1058 MIU/ML

## 2022-09-18 ENCOUNTER — LAB (OUTPATIENT)
Dept: LAB | Facility: HOSPITAL | Age: 27
End: 2022-09-18

## 2022-09-18 DIAGNOSIS — O20.0 THREATENED ABORTION: Primary | ICD-10-CM

## 2022-09-18 LAB — HCG INTACT+B SERPL-ACNC: 349.6 MIU/ML

## 2022-09-18 PROCEDURE — 84702 CHORIONIC GONADOTROPIN TEST: CPT | Performed by: OBSTETRICS & GYNECOLOGY

## 2022-09-18 PROCEDURE — 36415 COLL VENOUS BLD VENIPUNCTURE: CPT | Performed by: OBSTETRICS & GYNECOLOGY

## 2022-09-28 ENCOUNTER — TRANSCRIBE ORDERS (OUTPATIENT)
Dept: LAB | Facility: HOSPITAL | Age: 27
End: 2022-09-28

## 2022-09-28 ENCOUNTER — LAB (OUTPATIENT)
Dept: LAB | Facility: HOSPITAL | Age: 27
End: 2022-09-28

## 2022-09-28 DIAGNOSIS — O02.1 MISSED ABORTION: ICD-10-CM

## 2022-09-28 DIAGNOSIS — O02.1 MISSED ABORTION: Primary | ICD-10-CM

## 2022-09-28 PROCEDURE — 84702 CHORIONIC GONADOTROPIN TEST: CPT

## 2022-09-28 PROCEDURE — 36415 COLL VENOUS BLD VENIPUNCTURE: CPT

## 2022-09-29 LAB — HCG INTACT+B SERPL-ACNC: 1.93 MIU/ML

## 2022-10-25 ENCOUNTER — OFFICE VISIT (OUTPATIENT)
Dept: FAMILY MEDICINE CLINIC | Facility: CLINIC | Age: 27
End: 2022-10-25

## 2022-10-25 VITALS
WEIGHT: 179 LBS | BODY MASS INDEX: 36.08 KG/M2 | HEIGHT: 59 IN | DIASTOLIC BLOOD PRESSURE: 76 MMHG | OXYGEN SATURATION: 99 % | HEART RATE: 72 BPM | SYSTOLIC BLOOD PRESSURE: 118 MMHG

## 2022-10-25 DIAGNOSIS — R63.5 WEIGHT GAIN: ICD-10-CM

## 2022-10-25 DIAGNOSIS — Z13.220 SCREENING FOR HYPERLIPIDEMIA: ICD-10-CM

## 2022-10-25 DIAGNOSIS — Z13.1 SCREENING FOR DIABETES MELLITUS: ICD-10-CM

## 2022-10-25 DIAGNOSIS — F41.1 GENERALIZED ANXIETY DISORDER: Primary | ICD-10-CM

## 2022-10-25 DIAGNOSIS — E66.9 OBESITY (BMI 35.0-39.9 WITHOUT COMORBIDITY): ICD-10-CM

## 2022-10-25 PROCEDURE — 99204 OFFICE O/P NEW MOD 45 MIN: CPT | Performed by: INTERNAL MEDICINE

## 2022-10-25 NOTE — PROGRESS NOTES
Office Note     Name: Erin Mckeon    : 1995     MRN: [id number]     Chief Complaint  Establish Care, Anxiety (Pt would like to get her zoloft refilled through us. ), and Obesity (Pt would like to discuss something for weight loss. )    Subjective     History of Present Illness:  Erin Mckeon is a 27 y.o. female who presents today for establish care      Anxiety, started post partum in , and has done well on it and symptoms were very well controlled,  Started out on 50 MG and then OBGYN increased to 100 MG, and most recently went to 150 MG via online HERS evaluation.  At the time she was having a lot panic attacks ,feeling overwhelmed frequently and irritable, which has al imporved on the higher dose    Obesity: has tried counting calories which has not been successful.  Uses myfitness pal carmita to count calories (1200 per day) and count macros.  Had gotten up to 185 lb and has lost weight about 7-8 lb, lost all at once then plataued in the past 6 weeks. Has also had continues breast growth.  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    Has not been on weight loss medication        Review of Systems:   Review of Systems    Past Medical History:   Past Medical History:   Diagnosis Date   • Kidney stone        Past Surgical History: History reviewed. No pertinent surgical history.    Family History: History reviewed. No pertinent family history.    Social History:   Social History     Socioeconomic History   • Marital status:    Tobacco Use   • Smoking status: Never   • Smokeless tobacco: Never   Vaping Use   • Vaping Use: Never used   Substance and Sexual Activity   • Alcohol use: No   • Drug use: No   • Sexual activity: Defer       Immunizations:   Immunization History   Administered Date(s) Administered   • COVID-19 (PFIZER) PURPLE CAP 2021, 2021   • MMR 2019        Medications:     Current Outpatient  "Medications:   •  sertraline (ZOLOFT) 50 MG tablet, Take 3 tablets by mouth Daily. Pt take 3 pills 1 qd., Disp: , Rfl:   •  sertraline (ZOLOFT) 50 MG tablet, Take 1 tablet by mouth Daily., Disp: 90 tablet, Rfl: 2    Allergies:   Allergies   Allergen Reactions   • Penicillins Swelling     Orbital swelling       Objective     Vital Signs  /76   Pulse 72   Ht 149.9 cm (59\")   Wt 81.2 kg (179 lb)   SpO2 99%   BMI 36.15 kg/m²   Estimated body mass index is 36.15 kg/m² as calculated from the following:    Height as of this encounter: 149.9 cm (59\").    Weight as of this encounter: 81.2 kg (179 lb).          Physical Exam  Vitals and nursing note reviewed.   Constitutional:       Appearance: Normal appearance.   Neck:      Thyroid: No thyroid mass.   Cardiovascular:      Rate and Rhythm: Normal rate and regular rhythm.      Heart sounds: No murmur heard.    No friction rub. No gallop.   Pulmonary:      Effort: Pulmonary effort is normal.      Breath sounds: Normal breath sounds. No wheezing, rhonchi or rales.   Neurological:      Mental Status: She is alert.          Procedures     Assessment and Plan     1. Generalized anxiety disorder  REFILL ZOLOFT 50 MG, 3 tablets by mouth daily  - Comprehensive Metabolic Panel; Future  - Lipid Panel; Future  - Hemoglobin A1c; Future  - TSH Rfx On Abnormal To Free T4; Future  - Comprehensive Metabolic Panel  - Lipid Panel  - Hemoglobin A1c  - TSH Rfx On Abnormal To Free T4    2. Weight gain  - Comprehensive Metabolic Panel; Future  - Lipid Panel; Future  - Hemoglobin A1c; Future  - TSH Rfx On Abnormal To Free T4; Future  - Comprehensive Metabolic Panel  - Lipid Panel  - Hemoglobin A1c  - TSH Rfx On Abnormal To Free T4    3. Obesity (BMI 35.0-39.9 without comorbidity)  - Comprehensive Metabolic Panel; Future  - Lipid Panel; Future  - Hemoglobin A1c; Future  - TSH Rfx On Abnormal To Free T4; Future  - Comprehensive Metabolic Panel  - Lipid Panel  - Hemoglobin A1c  - TSH Rfx On " Abnormal To Free T4    4. Screening for diabetes mellitus    - Comprehensive Metabolic Panel; Future  - Lipid Panel; Future  - Hemoglobin A1c; Future  - TSH Rfx On Abnormal To Free T4; Future  - Comprehensive Metabolic Panel  - Lipid Panel  - Hemoglobin A1c  - TSH Rfx On Abnormal To Free T4    5. Screening for hyperlipidemia  - Comprehensive Metabolic Panel; Future  - Lipid Panel; Future  - Hemoglobin A1c; Future  - TSH Rfx On Abnormal To Free T4; Future  - Comprehensive Metabolic Panel  - Lipid Panel  - Hemoglobin A1c  - TSH Rfx On Abnormal To Free T4       Follow Up  No follow-ups on file.    MD ANDREY [facility]  [address] [address] [phone]

## 2022-10-26 DIAGNOSIS — E66.9 OBESITY (BMI 35.0-39.9 WITHOUT COMORBIDITY): Primary | ICD-10-CM

## 2022-10-26 LAB
ALBUMIN SERPL-MCNC: 4.1 G/DL (ref 3.9–5)
ALBUMIN/GLOB SERPL: 2 {RATIO} (ref 1.2–2.2)
ALP SERPL-CCNC: 82 IU/L (ref 44–121)
ALT SERPL-CCNC: 19 IU/L (ref 0–32)
AST SERPL-CCNC: 17 IU/L (ref 0–40)
BILIRUB SERPL-MCNC: 0.3 MG/DL (ref 0–1.2)
BUN SERPL-MCNC: 7 MG/DL (ref 6–20)
BUN/CREAT SERPL: 11 (ref 9–23)
CALCIUM SERPL-MCNC: 9.5 MG/DL (ref 8.7–10.2)
CHLORIDE SERPL-SCNC: 107 MMOL/L (ref 96–106)
CHOLEST SERPL-MCNC: 159 MG/DL (ref 100–199)
CO2 SERPL-SCNC: 22 MMOL/L (ref 20–29)
CREAT SERPL-MCNC: 0.63 MG/DL (ref 0.57–1)
EGFRCR SERPLBLD CKD-EPI 2021: 126 ML/MIN/1.73
GLOBULIN SER CALC-MCNC: 2.1 G/DL (ref 1.5–4.5)
GLUCOSE SERPL-MCNC: 81 MG/DL (ref 70–99)
HBA1C MFR BLD: 5.3 % (ref 4.8–5.6)
HDLC SERPL-MCNC: 52 MG/DL
LDLC SERPL CALC-MCNC: 94 MG/DL (ref 0–99)
POTASSIUM SERPL-SCNC: 4.5 MMOL/L (ref 3.5–5.2)
PROT SERPL-MCNC: 6.2 G/DL (ref 6–8.5)
SODIUM SERPL-SCNC: 141 MMOL/L (ref 134–144)
TRIGL SERPL-MCNC: 64 MG/DL (ref 0–149)
TSH SERPL DL<=0.005 MIU/L-ACNC: 0.94 UIU/ML (ref 0.45–4.5)
VLDLC SERPL CALC-MCNC: 13 MG/DL (ref 5–40)

## 2022-10-26 RX ORDER — PEN NEEDLE, DIABETIC 30 GX3/16"
1 NEEDLE, DISPOSABLE MISCELLANEOUS DAILY
Qty: 100 EACH | Refills: 1 | Status: SHIPPED | OUTPATIENT
Start: 2022-10-26

## 2022-10-28 ENCOUNTER — TELEPHONE (OUTPATIENT)
Dept: FAMILY MEDICINE CLINIC | Facility: CLINIC | Age: 27
End: 2022-10-28

## 2022-11-01 ENCOUNTER — TELEPHONE (OUTPATIENT)
Dept: FAMILY MEDICINE CLINIC | Facility: CLINIC | Age: 27
End: 2022-11-01

## 2022-11-01 NOTE — TELEPHONE ENCOUNTER
Caller: Erin Mckeon    Relationship to patient: Self    Best call back number: [phone]    What is the call regarding:  PATIENT IS CALLING TO CHECK ON THE PRIOR AUTHORIZATION FOR HER SAXENDA.  PLEASE ADVISE.

## 2022-11-03 NOTE — TELEPHONE ENCOUNTER
Done PA over the phone and case number is [id number] they did say it would be 4 to 15 days to hear back . Calling pt to let know

## 2022-12-15 ENCOUNTER — OFFICE VISIT (OUTPATIENT)
Dept: FAMILY MEDICINE CLINIC | Facility: CLINIC | Age: 27
End: 2022-12-15

## 2022-12-15 VITALS
OXYGEN SATURATION: 98 % | BODY MASS INDEX: 33.57 KG/M2 | HEART RATE: 73 BPM | DIASTOLIC BLOOD PRESSURE: 72 MMHG | HEIGHT: 60 IN | WEIGHT: 171 LBS | SYSTOLIC BLOOD PRESSURE: 116 MMHG

## 2022-12-15 DIAGNOSIS — N62 GYNECOMASTIA: ICD-10-CM

## 2022-12-15 DIAGNOSIS — E66.9 OBESITY (BMI 35.0-39.9 WITHOUT COMORBIDITY): Primary | ICD-10-CM

## 2022-12-15 DIAGNOSIS — M54.9 BACK PAIN, UNSPECIFIED BACK LOCATION, UNSPECIFIED BACK PAIN LATERALITY, UNSPECIFIED CHRONICITY: ICD-10-CM

## 2022-12-15 PROCEDURE — 99213 OFFICE O/P EST LOW 20 MIN: CPT | Performed by: INTERNAL MEDICINE

## 2022-12-15 NOTE — PROGRESS NOTES
Office Note     Name: Erin Mckeon    : 1995     MRN: [id number]     Chief Complaint  Weight Check (Pt is here for  a follow up on her weight today. )    Subjective     History of Present Illness:  Erin Mckeon is a 27 y.o. female who presents today for  followup on weight loss plan    Started saxenda 6 weeks ago, reached full dose about 2 weeks ago, tolerating well.  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  Extremely active between her career and raising her children.  Does not do much dedicated exercise outside of that.    Has successfully lost about 8 pounds since starting the Saxenda and implementing her dietary changes.    One of her primary motivations for continuing to attempt weight loss is because of her chronic back pain.  Currently wears a size double D bra which is unchanged for several years since she went through puberty.  She thinks this is significantly contributed to her back pain.  Conservative measures (NSAIDS, exercise) have been ineffective in the past.    Review of Systems:   Review of Systems    Past Medical History:   Past Medical History:   Diagnosis Date   • Kidney stone        Past Surgical History: History reviewed. No pertinent surgical history.    Family History: History reviewed. No pertinent family history.    Social History:   Social History     Socioeconomic History   • Marital status:    Tobacco Use   • Smoking status: Never   • Smokeless tobacco: Never   Vaping Use   • Vaping Use: Never used   Substance and Sexual Activity   • Alcohol use: No   • Drug use: No   • Sexual activity: Defer       Immunizations:   Immunization History   Administered Date(s) Administered   • COVID-19 (PFIZER) PURPLE CAP 2021, 2021   • MMR 2019        Medications:     Current Outpatient Medications:   •  Insulin Pen  "Needle (Pen Needles) 32G X 4 MM misc, Use once daily with Saxenda as directed, Disp: 100 each, Rfl: 1  •  sertraline (ZOLOFT) 50 MG tablet, Take 3 tablets by mouth Daily., Disp: 240 tablet, Rfl: 1  •  Liraglutide (SAXENDA) 18 MG/3ML injection pen, Inject 3 mg under the skin into the appropriate area as directed Daily., Disp: 15 mL, Rfl: 3    Allergies:   Allergies   Allergen Reactions   • Penicillins Swelling     Orbital swelling       Objective     Vital Signs  /72   Pulse 73   Ht 152.4 cm (60\")   Wt 77.6 kg (171 lb)   SpO2 98%   BMI 33.40 kg/m²   Estimated body mass index is 33.4 kg/m² as calculated from the following:    Height as of this encounter: 152.4 cm (60\").    Weight as of this encounter: 77.6 kg (171 lb).    BMI is >= 30 and <35. (Class 1 Obesity). The following options were offered after discussion;: exercise counseling/recommendations, nutrition counseling/recommendations and pharmacological intervention options      Physical Exam  Vitals and nursing note reviewed.   Constitutional:       Appearance: Normal appearance.   Neck:      Thyroid: No thyroid mass or thyromegaly.   Cardiovascular:      Rate and Rhythm: Normal rate and regular rhythm.      Heart sounds: No murmur heard.    No friction rub. No gallop.   Pulmonary:      Effort: Pulmonary effort is normal.      Breath sounds: Normal breath sounds. No wheezing, rhonchi or rales.   Neurological:      Mental Status: She is alert.            Procedures     Assessment and Plan     1. Obesity (BMI 35.0-39.9 without comorbidity)  Patient continues to be diligent with dietary monitoring calorie restriction.  I did encourage her to have an step counting with a goal of 10,000 steps daily, which could include her only activity at work as well.  I will send a refill on the Saxenda she is at the full dose.  - Liraglutide (SAXENDA) 18 MG/3ML injection pen; Inject 3 mg under the skin into the appropriate area as directed Daily.  Dispense: 15 mL; " Refill: 3    2. Gynecomastia  I suspect this is contributing to her chronic back pain.  I think she would be an excellent candidate for breast reduction surgery in the future when she completes her weight loss journey successfully.    3. Back pain, unspecified back location, unspecified back pain laterality, unspecified chronicity  Secondary to gynecomastia as above.  Down the road would consider furl for surgical breast reduction.       Follow Up  Return in about 2 months (around 2/15/2023) for Followup with Provider.    MD ANDREY Barney [facility]  [address] [id number] [phone]  Answers for HPI/ROS submitted by the patient on 12/15/2022  Please describe your symptoms.: Medication follow up  Have you had these symptoms before?: No  How long have you been having these symptoms?: Greater than 2 weeks  Please list any medications you are currently taking for this condition.: Saxenda  What is the primary reason for your visit?: Other

## 2023-02-27 ENCOUNTER — LAB (OUTPATIENT)
Dept: LAB | Facility: HOSPITAL | Age: 28
End: 2023-02-27
Payer: COMMERCIAL

## 2023-02-27 ENCOUNTER — TRANSCRIBE ORDERS (OUTPATIENT)
Dept: LAB | Facility: HOSPITAL | Age: 28
End: 2023-02-27
Payer: COMMERCIAL

## 2023-02-27 DIAGNOSIS — Z32.01 PREGNANCY EXAMINATION OR TEST, POSITIVE RESULT: Primary | ICD-10-CM

## 2023-02-27 DIAGNOSIS — Z32.01 PREGNANCY EXAMINATION OR TEST, POSITIVE RESULT: ICD-10-CM

## 2023-02-27 PROCEDURE — 84144 ASSAY OF PROGESTERONE: CPT

## 2023-02-27 PROCEDURE — 36415 COLL VENOUS BLD VENIPUNCTURE: CPT

## 2023-02-27 PROCEDURE — 84702 CHORIONIC GONADOTROPIN TEST: CPT

## 2023-02-28 LAB
HCG INTACT+B SERPL-ACNC: 77.3 MIU/ML
PROGEST SERPL-MCNC: 15.6 NG/ML

## 2023-03-01 ENCOUNTER — LAB (OUTPATIENT)
Dept: LAB | Facility: HOSPITAL | Age: 28
End: 2023-03-01
Payer: COMMERCIAL

## 2023-03-01 ENCOUNTER — TRANSCRIBE ORDERS (OUTPATIENT)
Dept: LAB | Facility: HOSPITAL | Age: 28
End: 2023-03-01
Payer: COMMERCIAL

## 2023-03-01 DIAGNOSIS — Z32.01 PREGNANCY EXAMINATION OR TEST, POSITIVE RESULT: Primary | ICD-10-CM

## 2023-03-01 DIAGNOSIS — Z32.01 PREGNANCY EXAMINATION OR TEST, POSITIVE RESULT: ICD-10-CM

## 2023-03-01 LAB — HCG INTACT+B SERPL-ACNC: 26.3 MIU/ML

## 2023-03-01 PROCEDURE — 84702 CHORIONIC GONADOTROPIN TEST: CPT

## 2023-03-01 PROCEDURE — 36415 COLL VENOUS BLD VENIPUNCTURE: CPT

## 2023-03-22 ENCOUNTER — TRANSCRIBE ORDERS (OUTPATIENT)
Dept: LAB | Facility: HOSPITAL | Age: 28
End: 2023-03-22
Payer: COMMERCIAL

## 2023-03-22 ENCOUNTER — LAB (OUTPATIENT)
Dept: LAB | Facility: HOSPITAL | Age: 28
End: 2023-03-22
Payer: COMMERCIAL

## 2023-03-22 DIAGNOSIS — O02.1 MISSED ABORTION: Primary | ICD-10-CM

## 2023-03-22 DIAGNOSIS — O02.1 MISSED ABORTION: ICD-10-CM

## 2023-03-22 LAB
ABO GROUP BLD: NORMAL
BLD GP AB SCN SERPL QL: NEGATIVE
DEPRECATED RDW RBC AUTO: 40.2 FL (ref 37–54)
ERYTHROCYTE [DISTWIDTH] IN BLOOD BY AUTOMATED COUNT: 12.4 % (ref 12.3–15.4)
HCT VFR BLD AUTO: 41 % (ref 34–46.6)
HGB BLD-MCNC: 13.5 G/DL (ref 12–15.9)
MCH RBC QN AUTO: 29.2 PG (ref 26.6–33)
MCHC RBC AUTO-ENTMCNC: 32.9 G/DL (ref 31.5–35.7)
MCV RBC AUTO: 88.7 FL (ref 79–97)
PLATELET # BLD AUTO: 353 10*3/MM3 (ref 140–450)
PMV BLD AUTO: 10.8 FL (ref 6–12)
RBC # BLD AUTO: 4.62 10*6/MM3 (ref 3.77–5.28)
RH BLD: POSITIVE
WBC NRBC COR # BLD: 7.6 10*3/MM3 (ref 3.4–10.8)

## 2023-03-22 PROCEDURE — 84144 ASSAY OF PROGESTERONE: CPT

## 2023-03-22 PROCEDURE — 84702 CHORIONIC GONADOTROPIN TEST: CPT

## 2023-03-22 PROCEDURE — 86900 BLOOD TYPING SEROLOGIC ABO: CPT

## 2023-03-22 PROCEDURE — 84443 ASSAY THYROID STIM HORMONE: CPT

## 2023-03-22 PROCEDURE — 86901 BLOOD TYPING SEROLOGIC RH(D): CPT

## 2023-03-22 PROCEDURE — 84146 ASSAY OF PROLACTIN: CPT

## 2023-03-22 PROCEDURE — 83520 IMMUNOASSAY QUANT NOS NONAB: CPT

## 2023-03-22 PROCEDURE — 86147 CARDIOLIPIN ANTIBODY EA IG: CPT

## 2023-03-22 PROCEDURE — 86148 ANTI-PHOSPHOLIPID ANTIBODY: CPT

## 2023-03-22 PROCEDURE — 86850 RBC ANTIBODY SCREEN: CPT

## 2023-03-22 PROCEDURE — 36415 COLL VENOUS BLD VENIPUNCTURE: CPT

## 2023-03-22 PROCEDURE — 85027 COMPLETE CBC AUTOMATED: CPT

## 2023-03-23 LAB
HCG INTACT+B SERPL-ACNC: <1 MIU/ML
PROGEST SERPL-MCNC: 9.45 NG/ML
PROLACTIN SERPL-MCNC: 18.1 NG/ML (ref 4.79–23.3)
TSH SERPL DL<=0.05 MIU/L-ACNC: 2.44 UIU/ML (ref 0.27–4.2)

## 2023-04-05 LAB
INTERPRETATION: NORMAL
SPECIMEN STATUS: NORMAL

## 2023-05-12 ENCOUNTER — TRANSCRIBE ORDERS (OUTPATIENT)
Dept: LAB | Facility: HOSPITAL | Age: 28
End: 2023-05-12
Payer: COMMERCIAL

## 2023-05-12 ENCOUNTER — LAB (OUTPATIENT)
Dept: LAB | Facility: HOSPITAL | Age: 28
End: 2023-05-12
Payer: COMMERCIAL

## 2023-05-12 DIAGNOSIS — Z32.01 PREGNANCY EXAMINATION OR TEST, POSITIVE RESULT: Primary | ICD-10-CM

## 2023-05-12 DIAGNOSIS — Z32.01 PREGNANCY EXAMINATION OR TEST, POSITIVE RESULT: ICD-10-CM

## 2023-05-12 LAB
ABO GROUP BLD: NORMAL
AMPHET+METHAMPHET UR QL: NEGATIVE
AMPHETAMINES UR QL: NEGATIVE
BARBITURATES UR QL SCN: NEGATIVE
BENZODIAZ UR QL SCN: NEGATIVE
BLD GP AB SCN SERPL QL: NEGATIVE
BUPRENORPHINE SERPL-MCNC: NEGATIVE NG/ML
CANNABINOIDS SERPL QL: NEGATIVE
COCAINE UR QL: NEGATIVE
DEPRECATED RDW RBC AUTO: 41.5 FL (ref 37–54)
ERYTHROCYTE [DISTWIDTH] IN BLOOD BY AUTOMATED COUNT: 13.1 % (ref 12.3–15.4)
HBV SURFACE AG SERPL QL IA: NORMAL
HCT VFR BLD AUTO: 39.9 % (ref 34–46.6)
HCV AB SER DONR QL: NORMAL
HGB BLD-MCNC: 13.4 G/DL (ref 12–15.9)
MCH RBC QN AUTO: 29.4 PG (ref 26.6–33)
MCHC RBC AUTO-ENTMCNC: 33.6 G/DL (ref 31.5–35.7)
MCV RBC AUTO: 87.5 FL (ref 79–97)
METHADONE UR QL SCN: NEGATIVE
OPIATES UR QL: NEGATIVE
OXYCODONE UR QL SCN: NEGATIVE
PCP UR QL SCN: NEGATIVE
PLATELET # BLD AUTO: 319 10*3/MM3 (ref 140–450)
PMV BLD AUTO: 11.2 FL (ref 6–12)
PROPOXYPH UR QL: NEGATIVE
RBC # BLD AUTO: 4.56 10*6/MM3 (ref 3.77–5.28)
RH BLD: POSITIVE
TRICYCLICS UR QL SCN: NEGATIVE
WBC NRBC COR # BLD: 8.29 10*3/MM3 (ref 3.4–10.8)

## 2023-05-12 PROCEDURE — 82947 ASSAY GLUCOSE BLOOD QUANT: CPT

## 2023-05-12 PROCEDURE — 86762 RUBELLA ANTIBODY: CPT

## 2023-05-12 PROCEDURE — 85027 COMPLETE CBC AUTOMATED: CPT

## 2023-05-12 PROCEDURE — 86803 HEPATITIS C AB TEST: CPT

## 2023-05-12 PROCEDURE — 86780 TREPONEMA PALLIDUM: CPT

## 2023-05-12 PROCEDURE — 86900 BLOOD TYPING SEROLOGIC ABO: CPT

## 2023-05-12 PROCEDURE — 36415 COLL VENOUS BLD VENIPUNCTURE: CPT

## 2023-05-12 PROCEDURE — 80306 DRUG TEST PRSMV INSTRMNT: CPT

## 2023-05-12 PROCEDURE — 87086 URINE CULTURE/COLONY COUNT: CPT

## 2023-05-12 PROCEDURE — 87340 HEPATITIS B SURFACE AG IA: CPT

## 2023-05-12 PROCEDURE — 87491 CHLMYD TRACH DNA AMP PROBE: CPT

## 2023-05-12 PROCEDURE — 86901 BLOOD TYPING SEROLOGIC RH(D): CPT

## 2023-05-12 PROCEDURE — G0432 EIA HIV-1/HIV-2 SCREEN: HCPCS

## 2023-05-12 PROCEDURE — 87591 N.GONORRHOEAE DNA AMP PROB: CPT

## 2023-05-12 PROCEDURE — 87661 TRICHOMONAS VAGINALIS AMPLIF: CPT

## 2023-05-12 PROCEDURE — 86850 RBC ANTIBODY SCREEN: CPT

## 2023-05-13 LAB
BACTERIA SPEC AEROBE CULT: NO GROWTH
GLUCOSE SERPL-MCNC: 88 MG/DL (ref 65–99)
HIV1+2 AB SER QL: NORMAL
RUBV IGG SERPL IA-ACNC: <0.9 INDEX

## 2023-05-16 LAB
C TRACH RRNA SPEC QL NAA+PROBE: NEGATIVE
N GONORRHOEA RRNA SPEC QL NAA+PROBE: NEGATIVE
T VAGINALIS RRNA SPEC QL NAA+PROBE: NEGATIVE
TREPONEMA PALLIDUM IGG+IGM AB [PRESENCE] IN SERUM OR PLASMA BY IMMUNOASSAY: NON REACTIVE

## 2023-06-08 ENCOUNTER — TRANSCRIBE ORDERS (OUTPATIENT)
Dept: OBSTETRICS AND GYNECOLOGY | Facility: HOSPITAL | Age: 28
End: 2023-06-08
Payer: COMMERCIAL

## 2023-06-08 DIAGNOSIS — O28.5 ABNORMAL GENETIC TEST DURING PREGNANCY: Primary | ICD-10-CM

## 2023-06-08 DIAGNOSIS — N96 RECURRENT PREGNANCY LOSS: ICD-10-CM

## 2023-06-08 DIAGNOSIS — Z34.90 PREGNANCY, UNSPECIFIED GESTATIONAL AGE: ICD-10-CM

## 2023-07-19 PROBLEM — O28.5 ABNORMAL GENETIC TEST DURING PREGNANCY: Status: ACTIVE | Noted: 2023-07-19

## 2023-07-19 PROBLEM — N96 RECURRENT PREGNANCY LOSS: Status: ACTIVE | Noted: 2023-07-19

## 2023-07-19 PROBLEM — Z34.90 PREGNANCY: Status: ACTIVE | Noted: 2023-07-19

## 2023-08-23 ENCOUNTER — E-VISIT (OUTPATIENT)
Dept: FAMILY MEDICINE CLINIC | Facility: TELEHEALTH | Age: 28
End: 2023-08-23
Payer: COMMERCIAL

## 2023-08-23 DIAGNOSIS — F41.1 GENERALIZED ANXIETY DISORDER: Primary | ICD-10-CM

## 2023-08-23 PROCEDURE — BRIGHTMDVISIT: Performed by: NURSE PRACTITIONER

## 2023-08-23 NOTE — EXTERNAL PATIENT INSTRUCTIONS
Note   i have sent a 30 day refill to your pharmacy, subsequent refills must come from your PCP.   Diagnosis   Behavioral health screening   My name is STEPHAN Orosco. I'm a healthcare provider at [facility]. I've reviewed your interview. Based on your responses, I'm including recommendations as next steps in your care.   Many people with mild mental health symptoms don't need medication, and can be treated with counseling, coaching, or other types of care management.   When to seek care   If you're having suicidal thoughts, call 911 right away.   The [employer] Suicide and Crisis Lifeline is also available. You can dial [phone]   to call the lifeline. You can also connect with a counselor using their online chat  .   Call us at [phone]   with any sudden or unexpected symptoms.    New or worsening depression symptoms    New or worsening anxiety symptoms    Feeling extremely agitated or restless    Panic attacks    Worsening insomnia    New or worsening irritability    Inappropriate aggression, anger, or violence    Dangerous impulses    Extreme increase in activity or talking    Other unusual changes in behavior, mood, thoughts, or feeling   Other treatment   The tips below may help you feel better:   Exercise    Physical exercise has an especially positive effect on mental health. If you can, try walking 30 minutes a day, 3 to 5 times a week. If that sounds like too much, challenge yourself to start walking for just 10 minutes a day. If walking is not for you, find another activity. Any kind of physical activity helps. The best exercise is the kind you enjoy and will actually do.   Improve your sleep   Getting better sleep is one of the best things you can do to improve your physical and mental health.    Caffeine, tobacco, and alcohol can cause interrupted sleep. Cutting down or quitting these can improve the quality of your sleep. If you can't quit caffeine completely, try avoiding it later in the day.    " Set a regular bedtime, and allow a period of time to \"unwind\" before going to sleep.    Wake up at the same time every day.    Turn off or put away all electronic devices an hour before going to sleep.    Avoid reading, watching TV, or using electronic devices in bed.    As much as possible, keep your bedroom dark, cool, and quiet.    If you're struggling to sleep, don't stay in bed. Get up and go to a quiet spot. Read or do relaxation exercises. Then go back to bed and try again.   Try mindfulness exercises    If your mind races, focus on your body instead. Breathe in slowly through your nose and out through your mouth.    Some people find that meditation helps with mood symptoms. If you want to try meditation but don't know how, mobile apps can get you started.   Use your creativity    Making something with your hands can help. Activities like writing in a journal, gardening, woodworking, cooking, or doing a craft can help focus your mind, and be therapeutic.   Connect with others    If you can't meet people in person, send a short text or email to someone just to keep in touch.    If you use social media, notice how it makes you feel. If certain topics or people have a negative effect on your mood, unfollow them. Limit the time you spend on social media. Active participation can be better than passive scrolling through a feed.    Try volunteering. Or just do something kind for someone. This can lift your mood as well as theirs.   Your provider   Your diagnosis was provided by STEPHAN Orosco, a member of your trusted care team at [facility].   If you have any questions, call us at [phone]  .    "

## 2023-08-23 NOTE — E-VISIT TREATED
Chief Complaint: Anxiety, Depression, Stress   Patient introduction   Patient is 28-year-old female presenting with mood symptoms. Patient has had current symptoms for more than a year. No recent unusual stress from their home situation, family, personal relationships, work, finances, COVID-19, or current news and events.   Warning. The following may warrant further investigation:        Patient is pregnant   Patient-submitted comments explaining reason for visit: I have had a prescription for Zoloft since 1/2020 and I need a refill..   Patient did not request an excuse note.   Depression screening   PHQ-9. Response options are: Not at all (0), On several days (1), More than half the days (2), or Nearly every day (3).   Over the past 2 weeks, patient has been bothered:    (0) Not at all by having little interest or pleasure in doing things    (0) Not at all by depressed mood    (0) Not at all by sleep disturbance    (0) Not at all by fatigue or lethargy    (0) Not at all by change in appetite    (0) Not at all by feelings of worthlessness or excessive guilt    (0) Not at all by poor concentration    (0) Not at all by observable restlessness or slowness in movement    (0) Not at all by thoughts of hurting themselves or that they would be better off dead   Score: 0. Interpretation: 0 to 4: None to minimal. 5 to 9: Mild depression. 10 to 14: Major Depressive Disorder, Mild. 15 to 19: Major Depressive Disorder, Moderately Severe. 20 to 27: Major Depressive Disorder, Severe.   Anxiety screening   СЕРГЕЙ-7. Response options are: Not at all (0), On several days (1), More than half the days (2), or Nearly every day (3)   Over the past 2 weeks, patient has been bothered:    (0) Not at all by feeling nervous, anxious, or on edge    (0) Not at all by not being able to stop or control worrying    (0) Not at all by worrying too much about different things    (0) Not at all by having trouble relaxing    (0) Not at all by being so  restless that it is hard to sit still    (0) Not at all by becoming easily annoyed or irritable    (0) Not at all by feeling afraid, as if something awful might happen   Score: 0. Interpretation: 0 to 4: None to minimal. 5 to 9: Mild anxiety. 10 to 14: Moderate anxiety. 15 to 21: Severe anxiety.   Suicide risk screening   Score: Negative screen (based on PHQ-9 responses above).   Action taken based on risk:    Negative screen: Patient completed interview.    Low risk: Patient completed interview. Follow-up per provider discretion.    Moderate risk: Recommended to call [phone] or [phone] or to go to their nearest ER. Patient given option to continue with the interview if those options are not relevant at this time. Follow-up per provider discretion.    High risk: Interview terminated. Recommended to go to ER or to call 911 or [phone].   Repetitive thoughts and behaviors screening   DSM-5 Level 1 Cross-Cutting Symptom Measure, Section X. 2 items. Response options are: Not at all (0), Rarely (1), Several days (2), More than half the days (3), or Nearly every day (4)   Over the past 2 weeks, patient has been bothered:    (0) Not at all by unpleasant thoughts, urges, or images that repeatedly enter their mind    (0) Not at all by feeling driven to repeat certain behaviors or mental acts   Score: 0. Interpretation: 0 to 2 (with 0 to 1 on both items): Negative screen. 2 or higher (with 2 or higher on either item): Positive screen.   Corrina/hypomania screening   DSM-5 Level 1 Cross-Cutting Symptom Measure, Section III. 2 items. Response options are: Not at all (0), Rarely (1), Several days (2), More than half the days (3), or Nearly every day (4)   Over the past 2 weeks, patient has been bothered:    (0) Not at all by sleeping less than usual, but still having a lot of energy    (0) Not at all by starting lots more projects than usual or doing more risky things than usual   Score: 0. Interpretation: 0 to 2 (with 1 on both items):  Negative screen. 2 or higher (with 2 or higher on at least 1 item): Positive screen; in-interview follow-up with Soumya Self-Rating Corrina (ASRM) Scale.   Psychosis/hallucination screening   DSM-5 Level 1 Cross-Cutting Symptom Measure, Section VII. 2 items. Response options are: Not at all (0), Rarely (1), Several days (2), More than half the days (3), or Nearly every day (4)   Over the past 2 weeks, patient has been bothered:    (0) Not at all by hearing things other people could not hear    (0) Not at all by feeling that someone could hear their thoughts   Score: 0. Interpretation: 0: Negative screen. 1 or higher: Positive screen.   Substance abuse screening   DSM-5 Level 1 Cross-Cutting Symptom Measure, Section XIII. 2 items on use of tobacco, recreational drugs, or prescription medications beyond the amount prescribed or duration of prescription.   Over the past 2 weeks, patient:    (0) Did not use tobacco    (0) Did not use a recreational or prescription drug on their own   Score: 0. Interpretation: 0 is a negative screen. 1 or higher with positive response for prescription/recreational drug abuse leads to follow-up with Level 2 Cross-Cutting Symptom Measure, Section XIII. 1 or higher with positive response for tobacco use leads to tobacco cessation advice in AVS.   Comorbid/Exacerbating conditions   No history of asthma, cancer, chronic pain, congestive heart failure, coronary artery disease, diabetes, epilepsy, hypertension, inflammatory arthritis, kidney disease or history of kindey function problems, lupus, multiple sclerosis, Parkinson disease, thyroid disorder, or viral hepatitis.   Past mental health history   Previous diagnosis of depression and generalized anxiety disorder. Regarding month and year of first depression diagnosis, patient writes: 1/2020. Since initial depression diagnosis, patient has not had a period when symptoms resolved and they did not need medication.   Family history of mental  health disorders   First-degree relative(s) with a history of depression and generalized anxiety disorder. Regarding medication taken by first-degree relative(s), patient writes: unanswered.   Current mental health treatment   Patient is not currently in counseling or therapy. Patient is not currently being seen by a psychiatrist and has not been seen by one in the last 2 years.   Currently taking sertraline.   As to effectiveness of current treatment:   Patient is satisfied with sertraline. Patient wants to refill sertraline.   Previous mental health treatment   Patient has not taken any other medications for any mental health condition in the past.   Pregnancy/menstrual status/breastfeeding   Patient is pregnant. Gestational age is 14 to 27 weeks.   Vital signs    Height: 152 centimeters    Weight: 78.4 kilograms   Current medications   Currently taking prenatal vitamin 27-0.8 27-0.8 MG tablet tablet and sertraline 50 MG tablet.   Medication allergies   None.   Medication contraindications   Because patient is pregnant, trazodone not prescribed.   Assessment   Encounter for screening examination for other mental health and behavioral disorders.   This diagnosis is based on review of patient interview responses and other available clinical information.    PHQ-9 depression screening score: 0. Interpretation: 0 to 4: None to minimal.    СЕРГЕЙ-7 generalized anxiety screening score: 0. Interpretation: 0 to 4: None to minimal.   Suicide risk severity screening was negative.   Screening results show low likelihood of dashawn/hypomania and psychosis.   Plan   Medications:   No medications prescribed.   Education:    Condition and causes    Treatment and self-care    Possible medication side effects    When to call provider   ----------   Electronically signed by STEPHAN Orosco on 2023-08-23 at 16:19PM   ----------   Patient Interview Transcript:   Have you ever been diagnosed with any of these mental health conditions?  Select all that apply.    Depression    Generalized anxiety disorder (СЕРГЕЙ)   Not selected:    Panic attacks    Post traumatic stress disorder (PTSD)    Obsessive-compulsive disorder (OCD)    Bipolar disorder    Schizophrenia or schizoaffective disorder    A mental health condition not listed here (specify)    None of the above   When were you first diagnosed with depression? Please specify the month and year, or your best estimate, as MM/YYYY.    1/2020   Since you were first diagnosed with depression, has there been a time when your symptoms went away completely and you didn't need to take medication? Select one.    No   Not selected:    Yes   Are you currently taking medication for any mental health condition? Select one.    Yes   Not selected:    No   Which of these medications are you currently taking for a mental health condition? Select all that apply.    Zoloft (sertraline)   Not selected:    Atarax or Vistaril (hydroxyzine)    BuSpar (buspirone)    Celexa (citalopram)    Cymbalta or Drizalma Sprinkle (duloxetine)    Effexor or Effexor XR (venlafaxine)    Lexapro (escitalopram)    Paxil, Paxil CR, or Pexeva (paroxetine)    Pristiq (desvenlafaxine)    Prozac (fluoxetine)    Remeron (mirtazapine)    Trazodone    Wellbutrin SR, Wellbutrin XL, Forfivo XL, or Aplenzin (bupropion)    Other (specify medication and whether you're satisfied with it)   Have you taken any other medications for this or any other mental health condition in the past? Select one.    No   Not selected:    Yes   I'm satisfied with Zoloft (sertraline)    Yes   Not selected:    No   I want to refill/restart    Yes   Not selected:    No   Have you recently experienced unusual stress from any of these? Select all that apply.    None of the above   Not selected:    Personal relationships    Home situation    Family    Work    Finances    Something related to COVID-19    Current news and events   Are you currently in counseling or therapy? Select  one.    No   Not selected:    Yes   Are you currently being seen by a psychiatrist, or have you been seen by a psychiatrist in the last 2 years? Select one.    No   Not selected:    Yes, currently    Yes, within the last 2 years   Do any of these apply to you? Select all that apply.    I'm pregnant   Not selected:    I've given birth in the past 12 months    I'm breastfeeding    None of the above   How far along are you in your pregnancy? Select one.    14 to 27 weeks   Not selected:    0 to 13 weeks    28 to 40 weeks    I'm not sure   Do you have any of these medical conditions? Scroll to see all options. Select all that apply.    None of the above   Not selected:    Asthma    Cancer    Chronic pain    Congestive heart failure    Coronary artery disease (blocked arteries in the heart)    Diabetes    Epilepsy    High blood pressure    Inflammatory arthritis    Kidney disease or history of kidney function problems    Lupus (SLE)    Multiple sclerosis    Parkinson disease    Thyroid disorder    Viral hepatitis   Do any of these apply to your first-degree blood relatives? First-degree blood relatives include parents, siblings, and children who you're related to by birth, not by marriage or adoption. Select all that apply.    Depression    Generalized anxiety disorder (СЕРГЕЙ)   Not selected:    Panic attacks    Post traumatic stress disorder (PTSD)    Obsessive-compulsive disorder (OCD)    Bipolar disorder    Schizophrenia or schizoaffective disorder    Drug or alcohol addiction (substance use disorder)     by suicide    Attempted suicide    No, not that I know of   Are any of your first-degree blood relatives taking medications for their condition? Select one.    Yes   Not selected:    No    I'm not sure   Genetics often play a role in how well medications work for mental health conditions. For example, if your sister with depression did well on sertraline (Zoloft), then it's likely that sertraline would work well  for you, too. If you know the name of the medication your family member takes for their mental health condition, list it here. If not, click Next.   The patient did not enter any additional information.   1. Over the past 2 weeks, how often have you been bothered by: Having little interest or pleasure in doing things Select one.    Not at all   Not selected:    Several days    More than half the days    Nearly every day   2. Over the past 2 weeks, how often have you been bothered by: Feeling down, depressed, or hopeless Select one.    Not at all   Not selected:    Several days    More than half the days    Nearly every day   3. Over the past 2 weeks, how often have you been bothered by: Trouble falling or staying asleep, or sleeping too much Select one.    Not at all   Not selected:    Several days    More than half the days    Nearly every day   4. Over the past 2 weeks, how often have you been bothered by: Feeling tired or having little energy Select one.    Not at all   Not selected:    Several days    More than half the days    Nearly every day   5. Over the past 2 weeks, how often have you been bothered by: Poor appetite or overeating Select one.    Not at all   Not selected:    Several days    More than half the days    Nearly every day   6. Over the past 2 weeks, how often have you been bothered by: Feeling bad about yourself, that you're a failure, or that you've let yourself or friends and family down Select one.    Not at all   Not selected:    Several days    More than half the days    Nearly every day   7. Over the past 2 weeks, how often have you been bothered by: Trouble concentrating on things like watching TV or reading the news Select one.    Not at all   Not selected:    Several days    More than half the days    Nearly every day   8. Over the past 2 weeks, how often have you been bothered by: Moving or speaking so slowly that other people could have noticed OR Being so fidgety or restless that you  have been moving around a lot more than usual Select one.    Not at all   Not selected:    Several days    More than half the days    Nearly every day   9. Over the past 2 weeks, how often have you been bothered by: Thoughts that you'd be better off dead or thoughts of hurting yourself Select one.    Not at all   Not selected:    Several days    More than half the days    Nearly every day   1. Over the past 2 weeks, how often have you been bothered by: Feeling nervous, anxious, or on edge? Select one.    Not at all   Not selected:    Several days    More than half the days    Nearly every day   2. Over the past 2 weeks, how often have you been bothered by: Not being able to stop or control worrying? Select one.    Not at all   Not selected:    Several days    More than half the days    Nearly every day   3. Over the past 2 weeks, how often have you been bothered by: Worrying too much about different things? Select one.    Not at all   Not selected:    Several days    More than half the days    Nearly every day   4. Over the past 2 weeks, how often have you been bothered by: Having trouble relaxing? Select one.    Not at all   Not selected:    Several days    More than half the days    Nearly every day   5. Over the past 2 weeks, how often have you been bothered by: Being so restless that it's hard to sit still? Select one.    Not at all   Not selected:    Several days    More than half the days    Nearly every day   6. Over the past 2 weeks, how often have you been bothered by: Becoming easily annoyed or irritable? Select one.    Not at all   Not selected:    Several days    More than half the days    Nearly every day   7. Over the past 2 weeks, how often have you been bothered by: Feeling afraid, as if something awful might happen? Select one.    Not at all   Not selected:    Several days    More than half the days    Nearly every day   Over the past 2 weeks, how often have you been bothered by: Sleeping less than  usual, but still having a lot of energy? Select one.    Not at all   Not selected:    1 to 2 days    Several days    More than half the days    Nearly every day   Over the past 2 weeks, how often have you been bothered by: Starting lots more projects than usual or doing more risky things than usual? Select one.    Not at all   Not selected:    1 to 2 days    Several days    More than half the days    Nearly every day   Over the past 2 weeks, how often have you been bothered by: Hearing things other people couldn't hear, such as voices even when no one was around? Select one.    Not at all   Not selected:    1 to 2 days    Several days    More than half the days    Nearly every day   Over the past 2 weeks, how often have you been bothered by: Feeling that someone could hear your thoughts, or that you could hear what another person was thinking? Select one.    Not at all   Not selected:    1 to 2 days    Several days    More than half the days    Nearly every day   Over the past 2 weeks, how often have you been bothered by: Unpleasant thoughts, urges, or images that repeatedly enter your mind? Select one.    Not at all   Not selected:    1 to 2 days    Several days    More than half the days    Nearly every day   Over the past 2 weeks, how often have you been bothered by: Feeling driven to perform certain behaviors or mental acts over and over again? Select one.    Not at all   Not selected:    1 to 2 days    Several days    More than half the days    Nearly every day   In the past year, how often did you have a drink containing alcohol? Select one.    Never   Not selected:    Monthly or less    2 to 4 times per month    2 to 3 times per week    4 or more times per week   Over the past 2 weeks, how often have you: Smoked any cigarettes, smoked a cigar or pipe, or used snuff or chewing tobacco? Select one.    Not at all   Not selected:    1 to 2 days    Several days    More than half the days    Nearly every day   Over  "the past 2 weeks, how often did you use any of these on your own? \"On your own\" means without a doctor's prescription, or more than prescribed, or longer than prescribed. - Prescription painkillers, such as Vicodin - Stimulants, such as Ritalin or Adderall - Sedatives or tranquilizers, such as sleeping pills or Valium - Marijuana - Cocaine or crack - Club drugs, such as Ecstasy - Hallucinogens, such as LSD - Heroin - Inhalants or solvents, such as glue - Methamphetamines, such as speed Select one.    Not at all   Not selected:    1 to 2 days    Several days    More than half the days    Nearly every day   Think about all of the symptoms you've shared with us today. How long have you been feeling this way? Select one.    More than a year   Not selected:    Less than a year    I'm not sure   These last few questions help us make sure your treatment plan is safe for you. Do you have any of these conditions? Select all that apply.    None of these   Not selected:    Uncorrected or persistent electrolyte abnormalities, such as potassium, sodium, calcium or magnesium    QT prolongation    Congenital long QT syndrome (LQTS)    Ventricular arrhythmias, such as ventricular fibrillation or ventricular tachycardia    Bradycardia (low heart rate)    Recent heart attack    Congestive heart failure (CHF)   Do any of these apply to you now or in the recent past? \"Cold turkey\" here means stopping a medication suddenly rather than slowly taking lower and lower doses until you're off the medication. Select all that apply.    None of these   Not selected:    Seizure disorder    Bulimia or anorexia    Liver disease    Alcohol abuse    Stopped using alcohol \"cold turkey\"    Stopped using a sedative \"cold turkey\"    Stopped using an anti-seizure drug \"cold turkey\"    Stopped using a benzodiazepine drug (Klonopin, Valium, Ativan, Xanax) \"cold turkey\"   Do any of the following apply to you? Select all that apply.    None of these   Not " selected:    I'm currently taking pimozide    I'm currently taking thioridazine    I've taken an MAO inhibitor in the last 14 days    I've taken linezolid or IV methylene blue in the last 14 days   Are you still taking these medications listed in your medical record? If you're not taking any of these, click Next. Select all that apply.    prenatal vitamin 27-0.8 27-0.8 MG tablet tablet    sertraline 50 MG tablet   Are you taking any other medications, vitamins, or supplements? Select one.    No   Not selected:    Yes   Have you ever had an allergic or bad reaction to any medication? Select one.    No   Not selected:    Yes   Knowing your Body Mass Index (BMI) can help your provider choose the best medication for you. To determine your BMI, we need to know your height and weight. Enter your height.    Height   Enter your weight (in pounds).    Weight   Do you need a doctor's note? A doctor's note confirms that you received care today and states when you can return to school or work. It does not contain information about your diagnosis or treatment plan. Your provider will make the final decision on whether to give you a doctor's note. Doctor's notes CANNOT be backdated. Select one.    No   Not selected:    Today only (1 day)    Today and tomorrow (2 days)    3 days   What is the main reason you're taking this interview today?    I have had a prescription for Zoloft since 1/2020 and I need a refill.   ----------   Medical history   Medical history data does not currently exist for this patient.

## 2023-09-11 ENCOUNTER — LAB (OUTPATIENT)
Dept: LAB | Facility: HOSPITAL | Age: 28
End: 2023-09-11
Payer: COMMERCIAL

## 2023-09-11 ENCOUNTER — TRANSCRIBE ORDERS (OUTPATIENT)
Dept: LAB | Facility: HOSPITAL | Age: 28
End: 2023-09-11
Payer: COMMERCIAL

## 2023-09-11 DIAGNOSIS — Z34.82 PRENATAL CARE, SUBSEQUENT PREGNANCY, SECOND TRIMESTER: Primary | ICD-10-CM

## 2023-09-11 DIAGNOSIS — Z34.82 PRENATAL CARE, SUBSEQUENT PREGNANCY, SECOND TRIMESTER: ICD-10-CM

## 2023-09-11 LAB
BLD GP AB SCN SERPL QL: NEGATIVE
DEPRECATED RDW RBC AUTO: 38.9 FL (ref 37–54)
ERYTHROCYTE [DISTWIDTH] IN BLOOD BY AUTOMATED COUNT: 12.3 % (ref 12.3–15.4)
GLUCOSE 1H P 100 G GLC PO SERPL-MCNC: 125 MG/DL (ref 65–139)
HCT VFR BLD AUTO: 33 % (ref 34–46.6)
HGB BLD-MCNC: 10.9 G/DL (ref 12–15.9)
MCH RBC QN AUTO: 28.8 PG (ref 26.6–33)
MCHC RBC AUTO-ENTMCNC: 33 G/DL (ref 31.5–35.7)
MCV RBC AUTO: 87.3 FL (ref 79–97)
PLATELET # BLD AUTO: 276 10*3/MM3 (ref 140–450)
PMV BLD AUTO: 11.5 FL (ref 6–12)
RBC # BLD AUTO: 3.78 10*6/MM3 (ref 3.77–5.28)
WBC NRBC COR # BLD: 10.88 10*3/MM3 (ref 3.4–10.8)

## 2023-09-11 PROCEDURE — 86850 RBC ANTIBODY SCREEN: CPT

## 2023-09-11 PROCEDURE — 36415 COLL VENOUS BLD VENIPUNCTURE: CPT

## 2023-09-11 PROCEDURE — 82950 GLUCOSE TEST: CPT

## 2023-09-11 PROCEDURE — 82962 GLUCOSE BLOOD TEST: CPT

## 2023-09-11 PROCEDURE — 85027 COMPLETE CBC AUTOMATED: CPT

## 2023-09-26 ENCOUNTER — HOSPITAL ENCOUNTER (OUTPATIENT)
Dept: WOMENS IMAGING | Facility: HOSPITAL | Age: 28
Discharge: HOME | End: 2023-09-26
Admitting: OBSTETRICS & GYNECOLOGY
Payer: COMMERCIAL

## 2023-09-26 ENCOUNTER — OFFICE VISIT (OUTPATIENT)
Dept: OBSTETRICS AND GYNECOLOGY | Facility: HOSPITAL | Age: 28
End: 2023-09-26
Payer: COMMERCIAL

## 2023-09-26 VITALS — WEIGHT: 185.6 LBS | SYSTOLIC BLOOD PRESSURE: 119 MMHG | BODY MASS INDEX: 36.25 KG/M2 | DIASTOLIC BLOOD PRESSURE: 66 MMHG

## 2023-09-26 DIAGNOSIS — Z34.90 PREGNANCY, UNSPECIFIED GESTATIONAL AGE: ICD-10-CM

## 2023-09-26 DIAGNOSIS — O28.5 ABNORMAL GENETIC TEST DURING PREGNANCY: ICD-10-CM

## 2023-09-26 DIAGNOSIS — N96 RECURRENT PREGNANCY LOSS: ICD-10-CM

## 2023-09-26 DIAGNOSIS — O28.5 ABNORMAL GENETIC TEST DURING PREGNANCY: Primary | ICD-10-CM

## 2023-09-26 DIAGNOSIS — O35.BXX0 FETAL VENTRICULAR SEPTAL DEFECT AFFECTING ANTEPARTUM CARE OF MOTHER, SINGLE OR UNSPECIFIED FETUS: ICD-10-CM

## 2023-09-26 PROCEDURE — 76816 OB US FOLLOW-UP PER FETUS: CPT

## 2023-09-26 PROCEDURE — 76825 ECHO EXAM OF FETAL HEART: CPT

## 2023-09-26 PROCEDURE — 93325 DOPPLER ECHO COLOR FLOW MAPG: CPT

## 2023-09-26 NOTE — LETTER
September 26, 2023     Elena Esparza MD  [address] [place] [id number]    Patient: Erin Mckeon   YOB: 1995   Date of Visit: 9/26/2023       Dear Elena Esparza MD:    Thank you for referring Erin Mckeon to me for evaluation. Below are the relevant portions of my assessment and plan of care.    Encounter Diagnosis and Orders:       If you have questions, please do not hesitate to call me. I look forward to following Erin along with you.         Sincerely,        Douglas A. Milligan, MD        CC: Quin Ambriz MD

## 2023-09-26 NOTE — ASSESSMENT & PLAN NOTE
Congenital heart disease (CHD) is the most common congenital anomaly and a ventricular septal defect is the most common type of CHD.  A VSD refers to an incomplete separation between the right and left ventricles.  Ventricular septal defects are classified into four major types: conoventricular (a hole where portions of the ventricular septum should meet just beneath the pulmonary and aortic valves), perimembranous (a hole in the upper section of the ventricular septum), inlet (a hole near the septum at the level of the tricuspid and mitral valves which can be a component of an atrioventricular septal defect), and muscular (where the hole is in the lower portion of the ventricular septum and is the most common type of VSD).  The prevalence of a VSD is 0.4 to 2.7 per 1,000 livebirths and accounts for 20-30% of congenital heart disease.        The majority of VSDs are sporadic; however, more than 100 genetic and chromosomal syndromes have been described with VSD as an abnormality.  Isolated VSDs usually have an excellent prognosis, with the quality of life for the patient similar to that of the general population.  The prognosis is frequently dependent on associated abnormalities or syndromic diagnosis.  Spontaneous closure in utero can occur but the incidence and mechanism remain unclear.  The reported incidence of spontaneous VSD closure ranges from 5-84% depending on the size, site and type of defect as well as the follow-up duration.  It appears that mid ventricular VSDs tend to close earlier and more frequently than the anterior or apical muscular VSD.  If these defects are isolated and are present after delivery, they typically do not cause symptoms, do not place stress/strain on the heart or lungs, and close spontaneously within the first year of life.  Sometimes, however, there are multiple ventricular septal defects not appreciated by fetal echocardiogram or the defect may be more hemodynamically significant  after birth than expected prenatally and require intervention in infancy.  Those that require closure may be closed surgically or in the cardiac catheterization laboratory.  In either case, a good recovery with a normal quality of life is expected.         The recurrence risk is 3% when one sibling is affected and 10% when two siblings are affected. The recurrence risk when a parent has a VSD is approximately 3%.

## 2023-09-26 NOTE — PROGRESS NOTES
"Documentation of the ultrasound findings, images, and interpretations will be available in the patient's Viewpoint report which is located in the imaging tab in chart review.    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  29w5d who presents today for abnormal NIPT    TAHIR: Estimated Date of Delivery: 23     ROS:   As noted in HPI.     Objective     Vital Signs  /66   Wt 84.2 kg (185 lb 9.6 oz)   LMP 2023   Estimated body mass index is 36.25 kg/m² as calculated from the following:    Height as of 12/15/22: 152.4 cm (60\").    Weight as of this encounter: 84.2 kg (185 lb 9.6 oz).    Physical Exam    Ultrasound Impression:   See viewpoint    Assessment and Plan     Erin Mckeon is a 28 y.o.  29w5d who presents today for abnormal NIPT    Diagnoses and all orders for this visit:    1. Abnormal genetic test during pregnancy (Primary)  -     [facility] [place]; Future    2. Pregnancy, unspecified gestational age  -     [facility]; Future    3. Fetal ventricular septal defect affecting antepartum care of mother, single or unspecified fetus  Assessment & Plan:  Congenital heart disease (CHD) is the most common congenital anomaly and a ventricular septal defect is the most common type of CHD.  A VSD refers to an incomplete separation between the right and left ventricles.  Ventricular septal defects are classified into four major types: conoventricular (a hole where portions of the ventricular septum should meet just beneath the pulmonary and aortic valves), perimembranous (a hole in the upper section of the ventricular septum), inlet (a hole near the septum at the level of the tricuspid and mitral valves " which can be a component of an atrioventricular septal defect), and muscular (where the hole is in the lower portion of the ventricular septum and is the most common type of VSD).  The prevalence of a VSD is 0.4 to 2.7 per 1,000 livebirths and accounts for 20-30% of congenital heart disease.        The majority of VSDs are sporadic; however, more than 100 genetic and chromosomal syndromes have been described with VSD as an abnormality.  Isolated VSDs usually have an excellent prognosis, with the quality of life for the patient similar to that of the general population.  The prognosis is frequently dependent on associated abnormalities or syndromic diagnosis.  Spontaneous closure in utero can occur but the incidence and mechanism remain unclear.  The reported incidence of spontaneous VSD closure ranges from 5-84% depending on the size, site and type of defect as well as the follow-up duration.  It appears that mid ventricular VSDs tend to close earlier and more frequently than the anterior or apical muscular VSD.  If these defects are isolated and are present after delivery, they typically do not cause symptoms, do not place stress/strain on the heart or lungs, and close spontaneously within the first year of life.  Sometimes, however, there are multiple ventricular septal defects not appreciated by fetal echocardiogram or the defect may be more hemodynamically significant after birth than expected prenatally and require intervention in infancy.  Those that require closure may be closed surgically or in the cardiac catheterization laboratory.  In either case, a good recovery with a normal quality of life is expected.         The recurrence risk is 3% when one sibling is affected and 10% when two siblings are affected. The recurrence risk when a parent has a VSD is approximately 3%.      Orders:  -     [facility]; Future         Follow Up  Return in about 4 weeks (around 10/24/2023).    I spent  15 minutes caring for the patient on the day of service. This included: obtaining or reviewing a separately obtained medical history, reviewing patient records, performing a medically appropriate exam and/or evaluation, counseling or educating the patient/family/caregiver, ordering medications, labs, and/or procedures and documenting such in the medical record. This does not include time spent on review and interpretation of other tests such as fetal ultrasound or the performance of other procedures such as amniocentesis or CVS.      Douglas A. Milligan, MD  Maternal Fetal Medicine, [facility]     2023

## 2023-10-05 ENCOUNTER — APPOINTMENT (OUTPATIENT)
Dept: GENERAL RADIOLOGY | Facility: HOSPITAL | Age: 28
End: 2023-10-05
Payer: COMMERCIAL

## 2023-10-05 ENCOUNTER — APPOINTMENT (OUTPATIENT)
Dept: CT IMAGING | Facility: HOSPITAL | Age: 28
End: 2023-10-05
Payer: COMMERCIAL

## 2023-10-05 ENCOUNTER — HOSPITAL ENCOUNTER (EMERGENCY)
Facility: HOSPITAL | Age: 28
Discharge: STILL ADMITTED OR ADMINISTRATIVE | End: 2023-10-05
Attending: EMERGENCY MEDICINE
Payer: COMMERCIAL

## 2023-10-05 ENCOUNTER — HOSPITAL ENCOUNTER (OUTPATIENT)
Facility: HOSPITAL | Age: 28
Discharge: HOME | End: 2023-10-05
Attending: OBSTETRICS & GYNECOLOGY | Admitting: OBSTETRICS & GYNECOLOGY
Payer: COMMERCIAL

## 2023-10-05 VITALS
RESPIRATION RATE: 17 BRPM | TEMPERATURE: 97.8 F | SYSTOLIC BLOOD PRESSURE: 105 MMHG | WEIGHT: 183 LBS | BODY MASS INDEX: 35.93 KG/M2 | HEIGHT: 60 IN | DIASTOLIC BLOOD PRESSURE: 73 MMHG | OXYGEN SATURATION: 100 % | HEART RATE: 93 BPM

## 2023-10-05 VITALS
BODY MASS INDEX: 35.93 KG/M2 | SYSTOLIC BLOOD PRESSURE: 117 MMHG | HEART RATE: 82 BPM | OXYGEN SATURATION: 98 % | RESPIRATION RATE: 18 BRPM | DIASTOLIC BLOOD PRESSURE: 73 MMHG | HEIGHT: 60 IN | WEIGHT: 183 LBS | TEMPERATURE: 97.8 F

## 2023-10-05 DIAGNOSIS — R06.00 DYSPNEA, UNSPECIFIED TYPE: Primary | ICD-10-CM

## 2023-10-05 DIAGNOSIS — Z3A.31 31 WEEKS GESTATION OF PREGNANCY: ICD-10-CM

## 2023-10-05 DIAGNOSIS — R07.9 CHEST PAIN, UNSPECIFIED TYPE: ICD-10-CM

## 2023-10-05 LAB
ALBUMIN SERPL-MCNC: 3.2 G/DL (ref 3.5–5.2)
ALBUMIN/GLOB SERPL: 1 G/DL
ALP SERPL-CCNC: 114 U/L (ref 39–117)
ALT SERPL W P-5'-P-CCNC: 19 U/L (ref 1–33)
ANION GAP SERPL CALCULATED.3IONS-SCNC: 10 MMOL/L (ref 5–15)
AST SERPL-CCNC: 18 U/L (ref 1–32)
B PARAPERT DNA SPEC QL NAA+PROBE: NOT DETECTED
B PERT DNA SPEC QL NAA+PROBE: NOT DETECTED
BASOPHILS # BLD AUTO: 0.04 10*3/MM3 (ref 0–0.2)
BASOPHILS NFR BLD AUTO: 0.3 % (ref 0–1.5)
BILIRUB SERPL-MCNC: 0.2 MG/DL (ref 0–1.2)
BUN SERPL-MCNC: 4 MG/DL (ref 6–20)
BUN/CREAT SERPL: 8.7 (ref 7–25)
C PNEUM DNA NPH QL NAA+NON-PROBE: NOT DETECTED
CALCIUM SPEC-SCNC: 9.2 MG/DL (ref 8.6–10.5)
CHLORIDE SERPL-SCNC: 108 MMOL/L (ref 98–107)
CO2 SERPL-SCNC: 21 MMOL/L (ref 22–29)
CREAT SERPL-MCNC: 0.46 MG/DL (ref 0.57–1)
D DIMER PPP FEU-MCNC: 0.96 MCGFEU/ML (ref 0–0.5)
DEPRECATED RDW RBC AUTO: 42.4 FL (ref 37–54)
EGFRCR SERPLBLD CKD-EPI 2021: 135.5 ML/MIN/1.73
EOSINOPHIL # BLD AUTO: 0.51 10*3/MM3 (ref 0–0.4)
EOSINOPHIL NFR BLD AUTO: 4.5 % (ref 0.3–6.2)
ERYTHROCYTE [DISTWIDTH] IN BLOOD BY AUTOMATED COUNT: 12.9 % (ref 12.3–15.4)
FLUAV SUBTYP SPEC NAA+PROBE: NOT DETECTED
FLUBV RNA ISLT QL NAA+PROBE: NOT DETECTED
GLOBULIN UR ELPH-MCNC: 3.1 GM/DL
GLUCOSE SERPL-MCNC: 104 MG/DL (ref 65–99)
HADV DNA SPEC NAA+PROBE: NOT DETECTED
HCOV 229E RNA SPEC QL NAA+PROBE: NOT DETECTED
HCOV HKU1 RNA SPEC QL NAA+PROBE: NOT DETECTED
HCOV NL63 RNA SPEC QL NAA+PROBE: NOT DETECTED
HCOV OC43 RNA SPEC QL NAA+PROBE: NOT DETECTED
HCT VFR BLD AUTO: 35.1 % (ref 34–46.6)
HGB BLD-MCNC: 11.3 G/DL (ref 12–15.9)
HMPV RNA NPH QL NAA+NON-PROBE: NOT DETECTED
HOLD SPECIMEN: NORMAL
HPIV1 RNA ISLT QL NAA+PROBE: NOT DETECTED
HPIV2 RNA SPEC QL NAA+PROBE: NOT DETECTED
HPIV3 RNA NPH QL NAA+PROBE: NOT DETECTED
HPIV4 P GENE NPH QL NAA+PROBE: NOT DETECTED
IMM GRANULOCYTES # BLD AUTO: 0.11 10*3/MM3 (ref 0–0.05)
IMM GRANULOCYTES NFR BLD AUTO: 1 % (ref 0–0.5)
LIPASE SERPL-CCNC: 23 U/L (ref 13–60)
LYMPHOCYTES # BLD AUTO: 1.44 10*3/MM3 (ref 0.7–3.1)
LYMPHOCYTES NFR BLD AUTO: 12.6 % (ref 19.6–45.3)
M PNEUMO IGG SER IA-ACNC: NOT DETECTED
MCH RBC QN AUTO: 28.9 PG (ref 26.6–33)
MCHC RBC AUTO-ENTMCNC: 32.2 G/DL (ref 31.5–35.7)
MCV RBC AUTO: 89.8 FL (ref 79–97)
MONOCYTES # BLD AUTO: 0.86 10*3/MM3 (ref 0.1–0.9)
MONOCYTES NFR BLD AUTO: 7.5 % (ref 5–12)
NEUTROPHILS NFR BLD AUTO: 74.1 % (ref 42.7–76)
NEUTROPHILS NFR BLD AUTO: 8.47 10*3/MM3 (ref 1.7–7)
NRBC BLD AUTO-RTO: 0 /100 WBC (ref 0–0.2)
NT-PROBNP SERPL-MCNC: 61.5 PG/ML (ref 0–450)
PLATELET # BLD AUTO: 282 10*3/MM3 (ref 140–450)
PMV BLD AUTO: 10.8 FL (ref 6–12)
POTASSIUM SERPL-SCNC: 3.4 MMOL/L (ref 3.5–5.2)
PROT SERPL-MCNC: 6.3 G/DL (ref 6–8.5)
QT INTERVAL: 348 MS
QTC INTERVAL: 423 MS
RBC # BLD AUTO: 3.91 10*6/MM3 (ref 3.77–5.28)
RHINOVIRUS RNA SPEC NAA+PROBE: NOT DETECTED
RSV RNA NPH QL NAA+NON-PROBE: NOT DETECTED
SARS-COV-2 RNA NPH QL NAA+NON-PROBE: NOT DETECTED
SODIUM SERPL-SCNC: 139 MMOL/L (ref 136–145)
TROPONIN T SERPL HS-MCNC: <6 NG/L
WBC NRBC COR # BLD: 11.43 10*3/MM3 (ref 3.4–10.8)
WHOLE BLOOD HOLD COAG: NORMAL
WHOLE BLOOD HOLD SPECIMEN: NORMAL

## 2023-10-05 PROCEDURE — 93005 ELECTROCARDIOGRAM TRACING: CPT | Performed by: EMERGENCY MEDICINE

## 2023-10-05 PROCEDURE — 36415 COLL VENOUS BLD VENIPUNCTURE: CPT

## 2023-10-05 PROCEDURE — 99284 EMERGENCY DEPT VISIT MOD MDM: CPT

## 2023-10-05 PROCEDURE — G0463 HOSPITAL OUTPT CLINIC VISIT: HCPCS

## 2023-10-05 PROCEDURE — 99214 OFFICE O/P EST MOD 30 MIN: CPT | Performed by: OBSTETRICS & GYNECOLOGY

## 2023-10-05 PROCEDURE — 93005 ELECTROCARDIOGRAM TRACING: CPT

## 2023-10-05 PROCEDURE — 25510000001 IOPAMIDOL PER 1 ML: Performed by: OBSTETRICS & GYNECOLOGY

## 2023-10-05 PROCEDURE — 71045 X-RAY EXAM CHEST 1 VIEW: CPT

## 2023-10-05 PROCEDURE — 80053 COMPREHEN METABOLIC PANEL: CPT | Performed by: EMERGENCY MEDICINE

## 2023-10-05 PROCEDURE — 84484 ASSAY OF TROPONIN QUANT: CPT | Performed by: EMERGENCY MEDICINE

## 2023-10-05 PROCEDURE — 59025 FETAL NON-STRESS TEST: CPT | Performed by: OBSTETRICS & GYNECOLOGY

## 2023-10-05 PROCEDURE — 85025 COMPLETE CBC W/AUTO DIFF WBC: CPT | Performed by: EMERGENCY MEDICINE

## 2023-10-05 PROCEDURE — 83690 ASSAY OF LIPASE: CPT | Performed by: EMERGENCY MEDICINE

## 2023-10-05 PROCEDURE — 0202U NFCT DS 22 TRGT SARS-COV-2: CPT | Performed by: EMERGENCY MEDICINE

## 2023-10-05 PROCEDURE — 83880 ASSAY OF NATRIURETIC PEPTIDE: CPT | Performed by: EMERGENCY MEDICINE

## 2023-10-05 PROCEDURE — 59025 FETAL NON-STRESS TEST: CPT

## 2023-10-05 PROCEDURE — 71275 CT ANGIOGRAPHY CHEST: CPT

## 2023-10-05 PROCEDURE — 85379 FIBRIN DEGRADATION QUANT: CPT | Performed by: EMERGENCY MEDICINE

## 2023-10-05 RX ORDER — HYDROXYZINE HYDROCHLORIDE 25 MG/1
50 TABLET, FILM COATED ORAL ONCE
Status: COMPLETED | OUTPATIENT
Start: 2023-10-05 | End: 2023-10-05

## 2023-10-05 RX ORDER — SODIUM CHLORIDE 0.9 % (FLUSH) 0.9 %
10 SYRINGE (ML) INJECTION AS NEEDED
Status: DISCONTINUED | OUTPATIENT
Start: 2023-10-05 | End: 2023-10-05 | Stop reason: HOSPADM

## 2023-10-05 RX ORDER — ALUMINA, MAGNESIA, AND SIMETHICONE 2400; 2400; 240 MG/30ML; MG/30ML; MG/30ML
30 SUSPENSION ORAL ONCE
Status: DISCONTINUED | OUTPATIENT
Start: 2023-10-05 | End: 2023-10-05 | Stop reason: HOSPADM

## 2023-10-05 RX ADMIN — IOPAMIDOL 140 ML: 755 INJECTION, SOLUTION INTRAVENOUS at 19:06

## 2023-10-05 RX ADMIN — HYDROXYZINE HYDROCHLORIDE 50 MG: 25 TABLET, FILM COATED ORAL at 19:45

## 2023-10-05 NOTE — ED PROVIDER NOTES
EMERGENCY DEPARTMENT ENCOUNTER    Pt Name: Erin Mckeon  MRN: [id number]  Pt :   1995  Room Number:  Room/bed info not found  Date of encounter:  10/5/2023  PCP: Elena Esparza MD  ED Provider: STEPHAN García    Historian: Patient    HPI:  Chief Complaint: chest tightness/ SOA, 31 weeks pregnant    Context: Erin Mckeon is a 28 y.o. female who presents to the ED c/o chest pain, shortness of breath.  Patient is a 31-week pregnant female.  She is a G6, P2.  Dr. Ambriz is her OB/GYN.  She advises last night she developed heaviness across her chest.  She complains of not being able to take a deep breath.  She denies any history of PE or DVT.  She has had some allergy symptoms.  She is 31 weeks pregnant.  She denies any abdominal pain.  Negative for vaginal bleeding.  HPI     REVIEW OF SYSTEMS  A chief complaint appropriate review of systems was completed and is negative except as noted in the HPI.     PAST MEDICAL HISTORY  Past Medical History:   Diagnosis Date    3/2023     Kidney stone 2019     (normal spontaneous vaginal delivery) 2019    Postpartum anemia 2021       PAST SURGICAL HISTORY  No past surgical history on file.    FAMILY HISTORY  Family History   Problem Relation Age of Onset    No Known Problems Mother     Hypertension Father        SOCIAL HISTORY  Social History     Socioeconomic History    Marital status:    Tobacco Use    Smoking status: Never    Smokeless tobacco: Never   Vaping Use    Vaping Use: Never used   Substance and Sexual Activity    Alcohol use: No    Drug use: No    Sexual activity: Defer       ALLERGIES  Penicillins    PHYSICAL EXAM  Physical Exam  Vitals and nursing note reviewed.   Constitutional:       General: She is not in acute distress.     Appearance: She is well-developed. She is not ill-appearing.   HENT:      Head: Normocephalic and atraumatic.      Mouth/Throat:      Mouth: Mucous membranes are moist.   Eyes:      Extraocular  Movements: Extraocular movements intact.      Pupils: Pupils are equal, round, and reactive to light.   Cardiovascular:      Rate and Rhythm: Normal rate and regular rhythm.   Pulmonary:      Effort: Pulmonary effort is normal.      Breath sounds: Normal breath sounds. No decreased breath sounds, wheezing or rhonchi.   Abdominal:      Comments: Nontender on exam.  Patient has a gravid abdomen.   Musculoskeletal:         General: Normal range of motion.      Cervical back: Normal range of motion and neck supple.      Right lower leg: No tenderness. No edema.      Left lower leg: No tenderness. No edema.   Skin:     General: Skin is warm and dry.   Neurological:      Mental Status: She is alert and oriented to person, place, and time.   Psychiatric:         Mood and Affect: Mood normal.         Behavior: Behavior normal.         LAB RESULTS  Results for orders placed or performed in visit on 09/11/23   CBC (No Diff)    Specimen: Blood   Result Value Ref Range    WBC 10.88 (H) 3.40 - 10.80 10*3/mm3    RBC 3.78 3.77 - 5.28 10*6/mm3    Hemoglobin 10.9 (L) 12.0 - 15.9 g/dL    Hematocrit 33.0 (L) 34.0 - 46.6 %    MCV 87.3 79.0 - 97.0 fL    MCH 28.8 26.6 - 33.0 pg    MCHC 33.0 31.5 - 35.7 g/dL    RDW 12.3 12.3 - 15.4 %    RDW-SD 38.9 37.0 - 54.0 fl    MPV 11.5 6.0 - 12.0 fL    Platelets 276 140 - 450 10*3/mm3   Glucose, Post 50 Gm Glucola    Specimen: Blood   Result Value Ref Range    Gestational  65 - 139 mg/dL   Antibody Screen    Specimen: Blood   Result Value Ref Range    Antibody Screen Negative        If labs were ordered, I independently reviewed the results and considered them in treating the patient.    RADIOLOGY  XR Chest 1 View   Final Result   Impression:   No acute process identified.         Electronically Signed: Sony Gordon MD     10/5/2023 2:23 PM CDT     Workstation ID: [id number]        [x] Radiologist's Report Reviewed:  I ordered and independently reviewed the above noted radiographic studies.   See radiologist's dictation for official interpretation.      PROCEDURES    Procedures    ECG 12 Lead ED Triage Standing Order; Chest Pain   Preliminary Result   Test Reason : ED Triage Standing Order~   Blood Pressure :   */*   mmHG   Vent. Rate :  89 BPM     Atrial Rate :  89 BPM      P-R Int : 124 ms          QRS Dur :  82 ms       QT Int : 348 ms       P-R-T Axes :  48   6  45 degrees      QTc Int : 423 ms      Normal sinus rhythm   Nonspecific ST and T wave abnormality   Abnormal ECG   No previous ECGs available      Referred By: EDMD           Confirmed By:           MEDICATIONS GIVEN IN ER    Medications - No data to display      MEDICAL DECISION MAKING, PROGRESS, and CONSULTS   Medical Decision Making  Erin Mckeon is a 28 y.o. female who presents to the ED c/o chest pain, shortness of breath.  Patient is a 31-week pregnant female.  She is a G6, P2.  Dr. Ambriz is her OB/GYN.  She advises last night she developed heaviness across her chest.  She complains of not being able to take a deep breath.  She denies any history of PE or DVT.  She has had some allergy symptoms.  She is 31 weeks pregnant.  She denies any abdominal pain.  Negative for vaginal bleeding.      Problems Addressed:  31 weeks gestation of pregnancy: complicated acute illness or injury     Details: Patient is 31 weeks pregnant.  Patient is a G6, P2.  Chest pain, unspecified type: complicated acute illness or injury     Details: Patient has had chest pain and shortness of breath for 1 night.  Dyspnea, unspecified type: complicated acute illness or injury     Details: Pregnancy adjusted D-dimer is negative for acute findings.  Chest x-ray is negative for acute findings.    Amount and/or Complexity of Data Reviewed  Labs: ordered. Decision-making details documented in ED Course.  Radiology: ordered. Decision-making details documented in ED Course.  ECG/medicine tests: ordered. Decision-making details documented in ED Course.    Risk  Prescription  drug management.        All labs have been independently reviewed by me.  All radiology studies have been reviewed by me and the radiologist dictating the report.  All EKG's have been independently viewed by me.    [] Discussed with radiology regarding test interpretation:    Discussion below represents my analysis of pertinent findings related to patient's condition, differential diagnosis, treatment plan and final disposition.    Differential diagnosis:  The differential diagnosis associated with the patient's presentation includes: Chest pain, shortness of breath, PE, pneumonia, pregnancy.    Additional sources  Discussed/ obtained information from independent historians:   [] Spouse  [] Parent  [] Family member  [] Friend  [] EMS   [] Other:  External (non-ED) record review:   [] Inpatient record:   [] Office record:   [x] Outpatient record:   [x] Prior Outpatient labs:   [x] Prior Outpatient radiology:   [] Primary Care record:   [] Outside ED record:   [] Other:   Patient's care impacted by:   [] Diabetes  [] Hypertension  [] Hyperlipidemia  [] Hypothyroidism   [] Coronary Artery Disease   [] COPD   [] Cancer   [] Obesity  [] GERD   [] Tobacco Abuse   [] Substance Abuse    [] Anxiety   [x] Depression   [x] Other: Patient is G6, P2.  Care significantly affected by Social Determinants of Health (housing and economic circumstances, unemployment)    [] Yes     [x] No   If yes, Patient's care significantly limited by  Social Determinants of Health including:   [] Inadequate housing   [] Low income   [] Alcoholism and drug addiction in family   [] Problems related to primary support group   [] Unemployment   [] Problems related to employment   [] Other Social Determinants of Health:     Shared decision making: Shared decision making with patient pregnancy adjusted D-dimer is negative.  We will discharge the patient home.  Patient to go to OB for monitoring.    Orders placed during this visit:  Orders Placed This  Encounter   Procedures    COVID PRE-OP / PRE-PROCEDURE SCREENING ORDER (NO ISOLATION) - Swab, Nasopharynx    Respiratory Panel PCR w/COVID-19(SARS-CoV-2) ROMERO/CRISTINO/GAMALIEL/PAD/COR/MAD/NEFTALI In-House, NP Swab in UTM/VTM, 3-4 HR TAT - Swab, Nasopharynx    XR Chest 1 View    [place] Draw    High Sensitivity Troponin T    Comprehensive Metabolic Panel    Lipase    BNP    CBC Auto Differential    D-dimer, Quantitative    CBC & Differential    Green Top (Gel)    Lavender Top    Gold Top - SST    Gray Top    Light Blue Top       I considered prescription management  with:   [] Pain medication  [] Antiviral  [] Antibiotic   [] Other:   Rationale:  Additional orders considered but not ordered:  The following testing was considered but ultimately not selected after discussion with patient/family:    ED Course:    ED Course as of 10/05/23 2245   Thu Oct 05, 2023   1637 D-Dimer, Quant(!): 0.96  Normal pregnancy adjusted D-dimer. [KG]   1711 Labor and delivery contacted.  Patient is currently in the triage to room.  Discharge instructions were given to the nurse. [KG]      ED Course User Index  [KG] Nayana Wall, APRN            DIAGNOSIS  Final diagnoses:   Dyspnea, unspecified type   Chest pain, unspecified type   31 weeks gestation of pregnancy       DISPOSITION    DISCHARGE    Patient discharged in stable condition.    Reviewed implications of results, diagnosis, meds, responsibility to follow up, warning signs and symptoms of possible worsening, potential complications and reasons to return to ER.    Patient/Family voiced understanding of above instructions.    Discussed plan for discharge, as there is no emergent indication for admission.  Pt/family is agreeable and understands need for follow up and possible repeat testing.  Pt/family is aware that discharge does not mean that nothing is wrong but that it indicates no emergency is currently present that requires admission and they must continue care with follow-up as given  below or with a physician of their choice.     FOLLOW-UP  Elena Esparza MD  [address] [id number] [phone]          Quin Ambriz MD  [address] [id number] [phone]               Medication List      No changes were made to your prescriptions during this visit.          ED Disposition       ED Disposition   Discharge    Condition   Stable    Comment   --               Please note that portions of this document were completed with voice recognition software.       Nayana Wall, APRN  10/05/23 4591

## 2023-10-05 NOTE — H&P
"[facility]  Obstetric History and Physical    Chief Complaint   Patient presents with    Shortness of Breath     10/4/23 2200- trouble catching her breath while laying down.       Subjective     Patient is a 28 y.o. female  currently at 31w0d, who presents with complaints of shortness of breath beginning last evening.  She denies chest pain but states she does feel a sensation \"like a weight on her chest\".  She denies headache, scotomata or epigastric pain.  She has no prior history of asthma.  She states symptoms were worse when trying to lie down.  Last evening, she states she actually had to walk around the room in order to facilitate breathing.  She denies dyspnea on exertion.  Of note is the fact that the patient did stop her prescription for Zoloft a few days ago.  Her prescription had .  She is now gotten a new prescription and took her first dose today.    Patient initially presented to the emergency room where a complete laboratory evaluation was ordered.  She did have a positive D-dimer, but I have informed her that that is actually usually elevated in pregnancy.  EKG confirmed sinus rhythm.  Testing for COVID-19, RSV and flu were all negative.  O2 sat is normal at 98% on room air.  She is not tachypneic.    Her prenatal care is benign to date. Her previous obstetric/gynecological history is notable for 2 prior vaginal deliveries and 3 prior spontaneous abortions.    The following portions of the patients history were reviewed and updated as appropriate: current medications, allergies, past medical history, past surgical history, past family history, past social history, and problem list .        Prenatal Information:   Maternal Prenatal Labs  Blood Type No results found for: ABO   Rh Status No results found for: RH   Antibody Screen No results found for: ABSCRN   Gonnorhea No results found for: GCCX   Chlamydia No results found for: CLAMYDCU   RPR No results found for: RPR   Syphilis " Antibody No results found for: SYPHILIS   Rubella No results found for: RUBELLAIGGIN   Hepatitis B Surface Antigen No results found for: HEPBSAG   HIV-1 Antibody No results found for: LABHIV1   Hepatitis C Antibody No results found for: HEPCAB   Rapid Urin Drug Screen No results found for: AMPMETHU, BARBITSCNUR, LABBENZSCN, LABMETHSCN, LABOPIASCN, THCURSCR, COCAINEUR, AMPHETSCREEN, PROPOXSCN, BUPRENORSCNU, METAMPSCNUR, OXYCODONESCN, TRICYCLICSCN   Group B Strep Culture No results found for: GBSANTIGEN           External Prenatal Results       Pregnancy Outside Results - Transcribed From Office Records - See Scanned Records For Details       Test Value Date Time    ABO  A  05/12/23 1607    Rh  Positive  05/12/23 1607    Antibody Screen  Negative  09/11/23 1150       Negative  05/12/23 1607       Negative  03/22/23 1632    Varicella IgG       Rubella  <0.90 index 05/12/23 1607    Hgb  11.3 g/dL 10/05/23 1451       10.9 g/dL 09/11/23 1150       13.4 g/dL 05/12/23 1607       13.5 g/dL 03/22/23 1632    Hct  35.1 % 10/05/23 1451       33.0 % 09/11/23 1150       39.9 % 05/12/23 1607       41.0 % 03/22/23 1632    Glucose Fasting GTT       Glucose Tolerance Test 1 hour       Glucose Tolerance Test 3 hour       Gonorrhea (discrete)  Negative  05/12/23 1607    Chlamydia (discrete)  Negative  05/12/23 1607    RPR  Non-Reactive  02/08/21 1101    VDRL       Syphilis Antibody       HBsAg  Non-Reactive  05/12/23 1607    Herpes Simplex Virus PCR       Herpes Simplex VIrus Culture       HIV  Non-Reactive  05/12/23 1607    Hep C RNA Quant PCR       Hep C Antibody  Non-Reactive  05/12/23 1607    AFP       Group B Strep ^ NEG  07/26/21     GBS Susceptibility to Clindamycin       GBS Susceptibility to Erythromycin       Fetal Fibronectin       Genetic Testing, Maternal Blood                 Drug Screening       Test Value Date Time    Urine Drug Screen       Amphetamine Screen  Negative  05/12/23 1607    Barbiturate Screen  Negative   23 1607    Benzodiazepine Screen  Negative  23 1607    Methadone Screen  Negative  23 1607    Phencyclidine Screen  Negative  23 1607    Opiates Screen  Negative  23 1607    THC Screen  Negative  23 1607    Cocaine Screen       Propoxyphene Screen  Negative  23 1607    Buprenorphine Screen  Negative  23 1607    Methamphetamine Screen       Oxycodone Screen  Negative  23 1607    Tricyclic Antidepressants Screen  Negative  23 1607              Legend    ^: Historical                              Past OB History:       OB History    Para Term  AB Living   6 2 2 0 3 2   SAB IAB Ectopic Molar Multiple Live Births   3 0 0 0 0 2      # Outcome Date GA Lbr Dwight/2nd Weight Sex Delivery Anes PTL Lv   6 Current            5 2023 6w0d          4 SAB 2022 6w0d          3 SAB 22 8w0d    Spon misscar      2 Term 21 37w0d  3405 g (7 lb 8.1 oz) F Vaginal unsp EPI N ZO      Name: JOHN BAUGH      Apgar1: 8  Apgar5: 9   1 Term 19 38w0d 20:04 / 01:23 3110 g (6 lb 13.7 oz) F Vag-Spont EPI N ZO      Complications: Chorioamnionitis      Name: JOHN BAUGH      Apgar1: 8  Apgar5: 9      Obstetric Comments   FOB #1 Pregnancy #1  at 38 weeks female   FOB #1 Pregnancy #2  at 37 weeks, female   FOB #1 Pregnancy #3 SAB at 8 weeks   FOB #1 Pregnancy #4 SAB at 6 weeks   FOB #1 Pregnancy #5 SAB at 6 weeks   FOB #1 Pregnancy #6 current       Past Medical History:  Past Medical History:   Diagnosis Date    3/2023     Kidney stone 2019     (normal spontaneous vaginal delivery) 2019    Postpartum anemia 2021      Past Surgical History History reviewed. No pertinent surgical history.   Family History: Family History   Problem Relation Age of Onset    No Known Problems Mother     Hypertension Father       Social History:  reports that she has never smoked. She has never used smokeless tobacco.   reports no history of  alcohol use.   reports no history of drug use.   Allergies: Penicillins  Current Medications:          Current Facility-Administered Medications on File Prior to Encounter   Medication Dose Route Frequency Provider Last Rate Last Admin    [DISCONTINUED] sodium chloride 0.9 % flush 10 mL  10 mL Intravenous PRN Kiran Todd MD         Current Outpatient Medications on File Prior to Encounter   Medication Sig Dispense Refill    ondansetron ODT (ZOFRAN-ODT) 4 MG disintegrating tablet Place 1 tablet on the tongue Every 6 (Six) Hours As Needed for nausea. 30 tablet 2    Prenatal Vit-Fe Fumarate-FA (prenatal vitamin 27-0.8) 27-0.8 MG tablet tablet Take  by mouth Daily.      Insulin Pen Needle (Pen Needles) 32G X 4 MM misc Use once daily with Saxenda as directed 100 each 1    Liraglutide (SAXENDA) 18 MG/3ML injection pen Inject 3 mg under the skin into the appropriate area as directed Daily. 15 mL 3    Progesterone (PROMETRIUM) 100 MG capsule Take 1 capsule by mouth Every Night. 30 capsule 2    sertraline (ZOLOFT) 50 MG tablet Take 3 tablets by mouth Daily. 90 tablet 0    sertraline (ZOLOFT) 50 MG tablet Take 3 tablets by mouth Daily. 270 tablet 4       General ROS: Pertinent items are noted in HPI, all other systems reviewed and negative    Objective       Vital Signs Range for the last 24 hours  Temperature: Temp:  [97.8 °F (36.6 °C)] 97.8 °F (36.6 °C)   Temp Source: Temp src: Oral   BP: BP: (105-117)/(73) 117/73   Pulse: Heart Rate:  [78-98] 94   Respirations: Resp:  [17-18] 18   SPO2: SpO2:  [96 %-100 %] 98 %   O2 Amount (l/min):     O2 Devices     Weight: Weight:  [83 kg (183 lb)] 83 kg (183 lb)     Physical Examination: General appearance - alert, well appearing, and in no distress, oriented to person, place, and time, and overweight  Mental status - alert, oriented to person, place, and time, normal mood, behavior, speech, dress, motor activity, and thought processes  Eyes - pupils equal and reactive,  extraocular eye movements intact, sclera anicteric  Neck - supple, no significant adenopathy, thyroid exam: thyroid is normal in size without nodules or tenderness  Chest - clear to auscultation, no wheezes, rales or rhonchi, symmetric air entry  Heart - normal rate, regular rhythm, normal S1, S2, no murmurs, rubs, clicks or gallops  Abdomen-soft, nontender, nondistended, no masses or organomegaly   Abdomen, Non-Tender  Neurological - alert, oriented, normal speech, no focal findings or movement disorder noted, DTR's normal and symmetric  Extremities - pedal edema 1+.  Varicosities noted especially on the left lower extremity.    Fetal Heart Rate Assessment  Indication: Shortness of breath   Start Time: 1640                end Time: 1716   NST Results: Reactive NST.  Fetal heart rate baseline 140 bpm.  Moderate variability with 10 x 10 accelerations noted.  No decelerations.  No regular uterine contraction pattern.        Laboratory Results:   Lab Results   Component Value Date    ALKPHOS 114 10/05/2023    ALT 19 10/05/2023    AST 18 10/05/2023    CREATININE 0.46 (L) 10/05/2023    BILITOT 0.2 10/05/2023     (H) 2021    URICACID 4.2 2021       WBC   Date Value Ref Range Status   10/05/2023 11.43 (H) 3.40 - 10.80 10*3/mm3 Final     RBC   Date Value Ref Range Status   10/05/2023 3.91 3.77 - 5.28 10*6/mm3 Final     Hemoglobin   Date Value Ref Range Status   10/05/2023 11.3 (L) 12.0 - 15.9 g/dL Final     Hematocrit   Date Value Ref Range Status   10/05/2023 35.1 34.0 - 46.6 % Final     MCV   Date Value Ref Range Status   10/05/2023 89.8 79.0 - 97.0 fL Final     MCH   Date Value Ref Range Status   10/05/2023 28.9 26.6 - 33.0 pg Final     MCHC   Date Value Ref Range Status   10/05/2023 32.2 31.5 - 35.7 g/dL Final     RDW   Date Value Ref Range Status   10/05/2023 12.9 12.3 - 15.4 % Final     RDW-SD   Date Value Ref Range Status   10/05/2023 42.4 37.0 - 54.0 fl Final     MPV   Date Value Ref Range  Status   10/05/2023 10.8 6.0 - 12.0 fL Final     Platelets   Date Value Ref Range Status   10/05/2023 282 140 - 450 10*3/mm3 Final     Neutrophil %   Date Value Ref Range Status   10/05/2023 74.1 42.7 - 76.0 % Final     Lymphocyte %   Date Value Ref Range Status   10/05/2023 12.6 (L) 19.6 - 45.3 % Final     Monocyte %   Date Value Ref Range Status   10/05/2023 7.5 5.0 - 12.0 % Final     Eosinophil %   Date Value Ref Range Status   10/05/2023 4.5 0.3 - 6.2 % Final     Basophil %   Date Value Ref Range Status   10/05/2023 0.3 0.0 - 1.5 % Final     Immature Grans %   Date Value Ref Range Status   10/05/2023 1.0 (H) 0.0 - 0.5 % Final     Neutrophils, Absolute   Date Value Ref Range Status   10/05/2023 8.47 (H) 1.70 - 7.00 10*3/mm3 Final     Lymphocytes, Absolute   Date Value Ref Range Status   10/05/2023 1.44 0.70 - 3.10 10*3/mm3 Final     Monocytes, Absolute   Date Value Ref Range Status   10/05/2023 0.86 0.10 - 0.90 10*3/mm3 Final     Eosinophils, Absolute   Date Value Ref Range Status   10/05/2023 0.51 (H) 0.00 - 0.40 10*3/mm3 Final     Basophils, Absolute   Date Value Ref Range Status   10/05/2023 0.04 0.00 - 0.20 10*3/mm3 Final     Immature Grans, Absolute   Date Value Ref Range Status   10/05/2023 0.11 (H) 0.00 - 0.05 10*3/mm3 Final     nRBC   Date Value Ref Range Status   10/05/2023 0.0 0.0 - 0.2 /100 WBC Final             Brief Urine Lab Results       None              Radiology Review: CTA shows no evidence of pulmonary embolism and no evidence of any acute intrathoracic abnormality.  Other Studies: D-dimer slightly elevated.  Mild hypokalemia noted with potassium = 3.4.  Respiratory panel negative for all pathogens.  Chest x-ray showed no acute cardiopulmonary changes.    Assessment & Plan       * No active hospital problems. *        Assessment:  Complaints of shortness of breath.    Plan:  CTA performed and no evidence of pulmonary embolism.  No evidence of cardiomegaly or pulmonary edema.  Echocardiogram  "not indicated.  Discharge home.  Patient to resume Zoloft 150 mg daily.  Hydroxyzine 50 mg orally now.     Total time spent today with Erin  was 30-39 minutes (level 4).  Of this time, > 50% was spent face-to-face time coordinating care, answering her questions and counseling regarding pathophysiology of her presenting problem along with plans for any diagnostic work-up and treatment.        Jose Rivera \"Lulu\" DOYLE Jenkins MD  10/5/2023  18:08 EDT    "

## 2023-10-16 LAB
QT INTERVAL: 348 MS
QTC INTERVAL: 423 MS

## 2023-10-31 ENCOUNTER — OFFICE VISIT (OUTPATIENT)
Dept: OBSTETRICS AND GYNECOLOGY | Facility: HOSPITAL | Age: 28
End: 2023-10-31
Payer: COMMERCIAL

## 2023-10-31 ENCOUNTER — HOSPITAL ENCOUNTER (OUTPATIENT)
Dept: WOMENS IMAGING | Facility: HOSPITAL | Age: 28
Discharge: HOME | End: 2023-10-31
Admitting: OBSTETRICS & GYNECOLOGY
Payer: COMMERCIAL

## 2023-10-31 VITALS — BODY MASS INDEX: 37.11 KG/M2 | SYSTOLIC BLOOD PRESSURE: 114 MMHG | DIASTOLIC BLOOD PRESSURE: 52 MMHG | WEIGHT: 190 LBS

## 2023-10-31 DIAGNOSIS — O35.BXX0 FETAL VENTRICULAR SEPTAL DEFECT AFFECTING ANTEPARTUM CARE OF MOTHER, SINGLE OR UNSPECIFIED FETUS: ICD-10-CM

## 2023-10-31 DIAGNOSIS — O28.5 ABNORMAL GENETIC TEST DURING PREGNANCY: ICD-10-CM

## 2023-10-31 DIAGNOSIS — O35.BXX0 FETAL VENTRICULAR SEPTAL DEFECT AFFECTING ANTEPARTUM CARE OF MOTHER, SINGLE OR UNSPECIFIED FETUS: Primary | ICD-10-CM

## 2023-10-31 DIAGNOSIS — Z34.90 PREGNANCY, UNSPECIFIED GESTATIONAL AGE: ICD-10-CM

## 2023-10-31 PROCEDURE — 76816 OB US FOLLOW-UP PER FETUS: CPT

## 2023-10-31 NOTE — PROGRESS NOTES
Patient denies bleeding, leaking fluid or contractions  NIPT atypical finding  Patients next follow up with Dr. Ambriz is 11/7/2023

## 2023-10-31 NOTE — LETTER
"2023       No Recipients    Patient: Erin Mckeon   YOB: 1995   Date of Visit: 10/31/2023       Dear Quin Ambriz MD,    Thank you for referring Erin Mckeon to me for evaluation. Below is a copy of my consult note.    If you have questions, please do not hesitate to call me. I look forward to following Erin along with you.         Sincerely,        Arabella Butts MD        CC:   No Recipients    Patient denies bleeding, leaking fluid or contractions  NIPT atypical finding  Patients next follow up with Dr. Ambriz is 2023        Maternal/Fetal Medicine Follow Up Note     Name: Erin Mckeon    : 1995     MRN: [id number]     Referring Provider: Quin Ambriz MD    Chief Complaint  fetal VSD    Subjective     History of Present Illness:  Erin Mckeon is a 28 y.o.  35w6d who presents today for follow up in the setting of known fetal VSD. NIPS with atypical sex chromosome result   No interval issues     TAHIR: Estimated Date of Delivery: 23     ROS:   As noted in HPI.     Objective     Vital Signs  /52   Wt 86.2 kg (190 lb)   LMP 2023   Estimated body mass index is 37.11 kg/m² as calculated from the following:    Height as of 10/5/23: 152.4 cm (60\").    Weight as of this encounter: 86.2 kg (190 lb).    Ultrasound Impression:   See viewpoint    Assessment and Plan     Erin Mckeon is a 28 y.o.  35w6d     Diagnoses and all orders for this visit:    1. Fetal ventricular septal defect affecting antepartum care of mother, single or unspecified fetus (Primary)  Assessment & Plan:  Stable appearing muscular VSD    pediatric evaluation recommended       2. Abnormal genetic test during pregnancy  Assessment & Plan:  Normal appearing ultrasound today with exception of small VSD    pediatric evaluation recommended           Follow Up  No follow-ups on file.    I spent 20 minutes caring for the patient on the " day of service. This included: obtaining or reviewing a separately obtained medical history, reviewing patient records, performing a medically appropriate exam and/or evaluation, counseling or educating the patient/family/caregiver, ordering medications, labs, and/or procedures and documenting such in the medical record. This does not include time spent on review and interpretation of other tests such as fetal ultrasound or the performance of other procedures such as amniocentesis or CVS.    Arabella Butts MD FACOG  Maternal Fetal Medicine, [facility]     10/31/2023

## 2023-11-07 LAB — EXTERNAL GROUP B STREP ANTIGEN: NEGATIVE

## 2023-11-08 NOTE — PROGRESS NOTES
"    Maternal/Fetal Medicine Follow Up Note     Name: Erin Mckeon    : 1995     MRN: [id number]     Referring Provider: Quin Ambriz MD    Chief Complaint  fetal VSD    Subjective     History of Present Illness:  Erin Mckeon is a 28 y.o.  35w6d who presents today for follow up in the setting of known fetal VSD. NIPS with atypical sex chromosome result   No interval issues     TAHIR: Estimated Date of Delivery: 23     ROS:   As noted in HPI.     Objective     Vital Signs  /52   Wt 86.2 kg (190 lb)   LMP 2023   Estimated body mass index is 37.11 kg/m² as calculated from the following:    Height as of 10/5/23: 152.4 cm (60\").    Weight as of this encounter: 86.2 kg (190 lb).    Ultrasound Impression:   See viewpoint    Assessment and Plan     Erin Mckeon is a 28 y.o.  35w6d     Diagnoses and all orders for this visit:    1. Fetal ventricular septal defect affecting antepartum care of mother, single or unspecified fetus (Primary)  Assessment & Plan:  Stable appearing muscular VSD    pediatric evaluation recommended       2. Abnormal genetic test during pregnancy  Assessment & Plan:  Normal appearing ultrasound today with exception of small VSD    pediatric evaluation recommended           Follow Up  No follow-ups on file.    I spent 20 minutes caring for the patient on the day of service. This included: obtaining or reviewing a separately obtained medical history, reviewing patient records, performing a medically appropriate exam and/or evaluation, counseling or educating the patient/family/caregiver, ordering medications, labs, and/or procedures and documenting such in the medical record. This does not include time spent on review and interpretation of other tests such as fetal ultrasound or the performance of other procedures such as amniocentesis or CVS.    Arabella Butts MD FACOG  Maternal Fetal Medicine, [facility] "    Diagnostic Center     10/31/2023

## 2023-11-11 ENCOUNTER — HOSPITAL ENCOUNTER (INPATIENT)
Facility: HOSPITAL | Age: 28
LOS: 3 days | Discharge: HOME | End: 2023-11-14
Attending: OBSTETRICS & GYNECOLOGY | Admitting: OBSTETRICS & GYNECOLOGY
Payer: COMMERCIAL

## 2023-11-11 PROBLEM — N93.9 VAGINAL BLEEDING: Status: ACTIVE | Noted: 2023-11-11

## 2023-11-11 LAB
ABO GROUP BLD: NORMAL
ALP SERPL-CCNC: 118 U/L (ref 39–117)
ALT SERPL W P-5'-P-CCNC: 45 U/L (ref 1–33)
AST SERPL-CCNC: 40 U/L (ref 1–32)
BILIRUB SERPL-MCNC: 0.4 MG/DL (ref 0–1.2)
BLD GP AB SCN SERPL QL: NEGATIVE
CREAT SERPL-MCNC: 0.53 MG/DL (ref 0.57–1)
DEPRECATED RDW RBC AUTO: 39.7 FL (ref 37–54)
ERYTHROCYTE [DISTWIDTH] IN BLOOD BY AUTOMATED COUNT: 13.2 % (ref 12.3–15.4)
FIBRINOGEN PPP-MCNC: 470 MG/DL (ref 203–470)
HCT VFR BLD AUTO: 32.1 % (ref 34–46.6)
HGB BLD-MCNC: 10.3 G/DL (ref 12–15.9)
LDH SERPL-CCNC: 248 U/L (ref 135–214)
MCH RBC QN AUTO: 26.7 PG (ref 26.6–33)
MCHC RBC AUTO-ENTMCNC: 32.1 G/DL (ref 31.5–35.7)
MCV RBC AUTO: 83.2 FL (ref 79–97)
PLATELET # BLD AUTO: 267 10*3/MM3 (ref 140–450)
PMV BLD AUTO: 11.1 FL (ref 6–12)
RBC # BLD AUTO: 3.86 10*6/MM3 (ref 3.77–5.28)
RH BLD: POSITIVE
T&S EXPIRATION DATE: NORMAL
URATE SERPL-MCNC: 4.2 MG/DL (ref 2.4–5.7)
WBC NRBC COR # BLD: 11.3 10*3/MM3 (ref 3.4–10.8)

## 2023-11-11 PROCEDURE — 83615 LACTATE (LD) (LDH) ENZYME: CPT | Performed by: OBSTETRICS & GYNECOLOGY

## 2023-11-11 PROCEDURE — 82247 BILIRUBIN TOTAL: CPT | Performed by: OBSTETRICS & GYNECOLOGY

## 2023-11-11 PROCEDURE — 86901 BLOOD TYPING SEROLOGIC RH(D): CPT | Performed by: OBSTETRICS & GYNECOLOGY

## 2023-11-11 PROCEDURE — 25810000003 LACTATED RINGERS SOLUTION: Performed by: OBSTETRICS & GYNECOLOGY

## 2023-11-11 PROCEDURE — 86850 RBC ANTIBODY SCREEN: CPT | Performed by: OBSTETRICS & GYNECOLOGY

## 2023-11-11 PROCEDURE — 84075 ASSAY ALKALINE PHOSPHATASE: CPT | Performed by: OBSTETRICS & GYNECOLOGY

## 2023-11-11 PROCEDURE — 85027 COMPLETE CBC AUTOMATED: CPT | Performed by: OBSTETRICS & GYNECOLOGY

## 2023-11-11 PROCEDURE — 84550 ASSAY OF BLOOD/URIC ACID: CPT | Performed by: OBSTETRICS & GYNECOLOGY

## 2023-11-11 PROCEDURE — 36415 COLL VENOUS BLD VENIPUNCTURE: CPT | Performed by: OBSTETRICS & GYNECOLOGY

## 2023-11-11 PROCEDURE — 86900 BLOOD TYPING SEROLOGIC ABO: CPT | Performed by: OBSTETRICS & GYNECOLOGY

## 2023-11-11 PROCEDURE — 82565 ASSAY OF CREATININE: CPT | Performed by: OBSTETRICS & GYNECOLOGY

## 2023-11-11 PROCEDURE — 25010000002 ONDANSETRON PER 1 MG: Performed by: OBSTETRICS & GYNECOLOGY

## 2023-11-11 PROCEDURE — 84450 TRANSFERASE (AST) (SGOT): CPT | Performed by: OBSTETRICS & GYNECOLOGY

## 2023-11-11 PROCEDURE — 84460 ALANINE AMINO (ALT) (SGPT): CPT | Performed by: OBSTETRICS & GYNECOLOGY

## 2023-11-11 PROCEDURE — 85384 FIBRINOGEN ACTIVITY: CPT | Performed by: OBSTETRICS & GYNECOLOGY

## 2023-11-11 RX ORDER — ONDANSETRON 2 MG/ML
4 INJECTION INTRAMUSCULAR; INTRAVENOUS EVERY 6 HOURS PRN
Status: DISCONTINUED | OUTPATIENT
Start: 2023-11-11 | End: 2023-11-12

## 2023-11-11 RX ORDER — SODIUM CHLORIDE 0.9 % (FLUSH) 0.9 %
1-10 SYRINGE (ML) INJECTION AS NEEDED
Status: DISCONTINUED | OUTPATIENT
Start: 2023-11-11 | End: 2023-11-12

## 2023-11-11 RX ORDER — SODIUM CHLORIDE 9 MG/ML
40 INJECTION, SOLUTION INTRAVENOUS AS NEEDED
Status: DISCONTINUED | OUTPATIENT
Start: 2023-11-11 | End: 2023-11-12

## 2023-11-11 RX ORDER — ACETAMINOPHEN 325 MG/1
650 TABLET ORAL EVERY 4 HOURS PRN
Status: DISCONTINUED | OUTPATIENT
Start: 2023-11-11 | End: 2023-11-12 | Stop reason: HOSPADM

## 2023-11-11 RX ORDER — LIDOCAINE HYDROCHLORIDE 10 MG/ML
0.5 INJECTION, SOLUTION EPIDURAL; INFILTRATION; INTRACAUDAL; PERINEURAL ONCE AS NEEDED
Status: DISCONTINUED | OUTPATIENT
Start: 2023-11-11 | End: 2023-11-12 | Stop reason: HOSPADM

## 2023-11-11 RX ORDER — FAMOTIDINE 10 MG/ML
20 INJECTION, SOLUTION INTRAVENOUS EVERY 12 HOURS PRN
Status: DISCONTINUED | OUTPATIENT
Start: 2023-11-11 | End: 2023-11-12

## 2023-11-11 RX ORDER — SODIUM CHLORIDE 9 MG/ML
40 INJECTION, SOLUTION INTRAVENOUS AS NEEDED
Status: DISCONTINUED | OUTPATIENT
Start: 2023-11-11 | End: 2023-11-12 | Stop reason: HOSPADM

## 2023-11-11 RX ORDER — SODIUM CHLORIDE, SODIUM LACTATE, POTASSIUM CHLORIDE, CALCIUM CHLORIDE 600; 310; 30; 20 MG/100ML; MG/100ML; MG/100ML; MG/100ML
125 INJECTION, SOLUTION INTRAVENOUS CONTINUOUS
Status: DISCONTINUED | OUTPATIENT
Start: 2023-11-11 | End: 2023-11-12

## 2023-11-11 RX ORDER — SODIUM CHLORIDE 0.9 % (FLUSH) 0.9 %
10 SYRINGE (ML) INJECTION AS NEEDED
Status: DISCONTINUED | OUTPATIENT
Start: 2023-11-11 | End: 2023-11-12 | Stop reason: HOSPADM

## 2023-11-11 RX ORDER — SODIUM CHLORIDE 0.9 % (FLUSH) 0.9 %
10 SYRINGE (ML) INJECTION EVERY 12 HOURS SCHEDULED
Status: DISCONTINUED | OUTPATIENT
Start: 2023-11-11 | End: 2023-11-12

## 2023-11-11 RX ORDER — MAGNESIUM CARB/ALUMINUM HYDROX 105-160MG
30 TABLET,CHEWABLE ORAL ONCE
Status: DISCONTINUED | OUTPATIENT
Start: 2023-11-11 | End: 2023-11-12 | Stop reason: HOSPADM

## 2023-11-11 RX ORDER — SODIUM CHLORIDE 0.9 % (FLUSH) 0.9 %
10 SYRINGE (ML) INJECTION EVERY 12 HOURS SCHEDULED
Status: DISCONTINUED | OUTPATIENT
Start: 2023-11-11 | End: 2023-11-12 | Stop reason: HOSPADM

## 2023-11-11 RX ADMIN — FAMOTIDINE 20 MG: 10 INJECTION, SOLUTION INTRAVENOUS at 22:57

## 2023-11-11 RX ADMIN — ONDANSETRON 4 MG: 2 INJECTION INTRAMUSCULAR; INTRAVENOUS at 21:24

## 2023-11-11 RX ADMIN — SODIUM CHLORIDE, POTASSIUM CHLORIDE, SODIUM LACTATE AND CALCIUM CHLORIDE 1000 ML: 600; 310; 30; 20 INJECTION, SOLUTION INTRAVENOUS at 18:36

## 2023-11-11 RX ADMIN — ACETAMINOPHEN 650 MG: 325 TABLET ORAL at 22:12

## 2023-11-12 ENCOUNTER — ANESTHESIA EVENT (OUTPATIENT)
Dept: LABOR AND DELIVERY | Facility: HOSPITAL | Age: 28
End: 2023-11-12
Payer: COMMERCIAL

## 2023-11-12 ENCOUNTER — ANESTHESIA (OUTPATIENT)
Dept: LABOR AND DELIVERY | Facility: HOSPITAL | Age: 28
End: 2023-11-12
Payer: COMMERCIAL

## 2023-11-12 PROBLEM — N96 RECURRENT PREGNANCY LOSS: Status: RESOLVED | Noted: 2023-07-19 | Resolved: 2023-11-12

## 2023-11-12 PROBLEM — N93.9 VAGINAL BLEEDING: Status: RESOLVED | Noted: 2023-11-11 | Resolved: 2023-11-12

## 2023-11-12 PROBLEM — Z34.90 PREGNANCY: Status: RESOLVED | Noted: 2023-07-19 | Resolved: 2023-11-12

## 2023-11-12 PROCEDURE — 51703 INSERT BLADDER CATH COMPLEX: CPT

## 2023-11-12 PROCEDURE — 25810000003 LACTATED RINGERS PER 1000 ML: Performed by: OBSTETRICS & GYNECOLOGY

## 2023-11-12 PROCEDURE — 25010000002 ROPIVACAINE PER 1 MG: Performed by: ANESTHESIOLOGY

## 2023-11-12 PROCEDURE — 25010000002 FENTANYL CITRATE (PF) 50 MCG/ML SOLUTION: Performed by: ANESTHESIOLOGY

## 2023-11-12 PROCEDURE — C1755 CATHETER, INTRASPINAL: HCPCS | Performed by: ANESTHESIOLOGY

## 2023-11-12 PROCEDURE — C1755 CATHETER, INTRASPINAL: HCPCS

## 2023-11-12 PROCEDURE — 25010000002 BUPIVACAINE (PF) 0.25 % SOLUTION: Performed by: ANESTHESIOLOGY

## 2023-11-12 RX ORDER — METOCLOPRAMIDE HYDROCHLORIDE 5 MG/ML
10 INJECTION INTRAMUSCULAR; INTRAVENOUS ONCE AS NEEDED
Status: DISCONTINUED | OUTPATIENT
Start: 2023-11-12 | End: 2023-11-12 | Stop reason: HOSPADM

## 2023-11-12 RX ORDER — OXYTOCIN/0.9 % SODIUM CHLORIDE 30/500 ML
250 PLASTIC BAG, INJECTION (ML) INTRAVENOUS CONTINUOUS
Status: ACTIVE | OUTPATIENT
Start: 2023-11-12 | End: 2023-11-12

## 2023-11-12 RX ORDER — OXYTOCIN/0.9 % SODIUM CHLORIDE 30/500 ML
125 PLASTIC BAG, INJECTION (ML) INTRAVENOUS CONTINUOUS PRN
Status: DISCONTINUED | OUTPATIENT
Start: 2023-11-12 | End: 2023-11-14 | Stop reason: HOSPADM

## 2023-11-12 RX ORDER — METHYLERGONOVINE MALEATE 0.2 MG/ML
200 INJECTION INTRAVENOUS ONCE AS NEEDED
Status: DISCONTINUED | OUTPATIENT
Start: 2023-11-12 | End: 2023-11-12 | Stop reason: HOSPADM

## 2023-11-12 RX ORDER — SODIUM CHLORIDE 0.9 % (FLUSH) 0.9 %
1-10 SYRINGE (ML) INJECTION AS NEEDED
Status: DISCONTINUED | OUTPATIENT
Start: 2023-11-12 | End: 2023-11-14 | Stop reason: HOSPADM

## 2023-11-12 RX ORDER — HYDROCODONE BITARTRATE AND ACETAMINOPHEN 5; 325 MG/1; MG/1
1 TABLET ORAL EVERY 4 HOURS PRN
Status: DISCONTINUED | OUTPATIENT
Start: 2023-11-12 | End: 2023-11-14 | Stop reason: HOSPADM

## 2023-11-12 RX ORDER — ONDANSETRON 4 MG/1
4 TABLET, FILM COATED ORAL EVERY 6 HOURS PRN
Status: DISCONTINUED | OUTPATIENT
Start: 2023-11-12 | End: 2023-11-14 | Stop reason: HOSPADM

## 2023-11-12 RX ORDER — EPHEDRINE SULFATE 5 MG/ML
10 INJECTION INTRAVENOUS
Status: DISCONTINUED | OUTPATIENT
Start: 2023-11-12 | End: 2023-11-12 | Stop reason: HOSPADM

## 2023-11-12 RX ORDER — LIDOCAINE HYDROCHLORIDE AND EPINEPHRINE 15; 5 MG/ML; UG/ML
INJECTION, SOLUTION EPIDURAL AS NEEDED
Status: DISCONTINUED | OUTPATIENT
Start: 2023-11-12 | End: 2023-11-12 | Stop reason: SURG

## 2023-11-12 RX ORDER — ONDANSETRON 2 MG/ML
4 INJECTION INTRAMUSCULAR; INTRAVENOUS EVERY 6 HOURS PRN
Status: DISCONTINUED | OUTPATIENT
Start: 2023-11-12 | End: 2023-11-14 | Stop reason: HOSPADM

## 2023-11-12 RX ORDER — DOCUSATE SODIUM 100 MG/1
100 CAPSULE, LIQUID FILLED ORAL 2 TIMES DAILY
Status: DISCONTINUED | OUTPATIENT
Start: 2023-11-12 | End: 2023-11-14 | Stop reason: HOSPADM

## 2023-11-12 RX ORDER — POLYETHYLENE GLYCOL 3350 17 G/17G
17 POWDER, FOR SOLUTION ORAL DAILY PRN
Status: DISCONTINUED | OUTPATIENT
Start: 2023-11-12 | End: 2023-11-14 | Stop reason: HOSPADM

## 2023-11-12 RX ORDER — SIMETHICONE 80 MG
80 TABLET,CHEWABLE ORAL 4 TIMES DAILY PRN
Status: DISCONTINUED | OUTPATIENT
Start: 2023-11-12 | End: 2023-11-14 | Stop reason: HOSPADM

## 2023-11-12 RX ORDER — HYDROCORTISONE 25 MG/G
1 CREAM TOPICAL AS NEEDED
Status: DISCONTINUED | OUTPATIENT
Start: 2023-11-12 | End: 2023-11-14 | Stop reason: HOSPADM

## 2023-11-12 RX ORDER — IBUPROFEN 600 MG/1
600 TABLET ORAL EVERY 6 HOURS PRN
Status: DISCONTINUED | OUTPATIENT
Start: 2023-11-12 | End: 2023-11-14 | Stop reason: HOSPADM

## 2023-11-12 RX ORDER — ONDANSETRON 2 MG/ML
4 INJECTION INTRAMUSCULAR; INTRAVENOUS ONCE AS NEEDED
Status: DISCONTINUED | OUTPATIENT
Start: 2023-11-12 | End: 2023-11-12 | Stop reason: HOSPADM

## 2023-11-12 RX ORDER — BISACODYL 10 MG
10 SUPPOSITORY, RECTAL RECTAL DAILY PRN
Status: DISCONTINUED | OUTPATIENT
Start: 2023-11-13 | End: 2023-11-14 | Stop reason: HOSPADM

## 2023-11-12 RX ORDER — OXYTOCIN/0.9 % SODIUM CHLORIDE 30/500 ML
2-20 PLASTIC BAG, INJECTION (ML) INTRAVENOUS
Status: DISCONTINUED | OUTPATIENT
Start: 2023-11-12 | End: 2023-11-12

## 2023-11-12 RX ORDER — DIPHENHYDRAMINE HYDROCHLORIDE 50 MG/ML
12.5 INJECTION INTRAMUSCULAR; INTRAVENOUS EVERY 8 HOURS PRN
Status: DISCONTINUED | OUTPATIENT
Start: 2023-11-12 | End: 2023-11-12 | Stop reason: HOSPADM

## 2023-11-12 RX ORDER — HYDROCODONE BITARTRATE AND ACETAMINOPHEN 5; 325 MG/1; MG/1
2 TABLET ORAL EVERY 6 HOURS PRN
Status: DISCONTINUED | OUTPATIENT
Start: 2023-11-12 | End: 2023-11-14 | Stop reason: HOSPADM

## 2023-11-12 RX ORDER — OXYTOCIN/0.9 % SODIUM CHLORIDE 30/500 ML
999 PLASTIC BAG, INJECTION (ML) INTRAVENOUS ONCE
Status: DISCONTINUED | OUTPATIENT
Start: 2023-11-12 | End: 2023-11-14 | Stop reason: HOSPADM

## 2023-11-12 RX ORDER — CITRIC ACID/SODIUM CITRATE 334-500MG
30 SOLUTION, ORAL ORAL ONCE
Status: DISCONTINUED | OUTPATIENT
Start: 2023-11-12 | End: 2023-11-12 | Stop reason: HOSPADM

## 2023-11-12 RX ORDER — PRENATAL VIT/IRON FUM/FOLIC AC 27MG-0.8MG
1 TABLET ORAL DAILY
Status: DISCONTINUED | OUTPATIENT
Start: 2023-11-12 | End: 2023-11-14 | Stop reason: HOSPADM

## 2023-11-12 RX ORDER — MISOPROSTOL 200 UG/1
800 TABLET ORAL ONCE AS NEEDED
Status: DISCONTINUED | OUTPATIENT
Start: 2023-11-12 | End: 2023-11-12 | Stop reason: HOSPADM

## 2023-11-12 RX ORDER — ROPIVACAINE HYDROCHLORIDE 2 MG/ML
15 INJECTION, SOLUTION EPIDURAL; INFILTRATION; PERINEURAL CONTINUOUS
Status: DISCONTINUED | OUTPATIENT
Start: 2023-11-12 | End: 2023-11-12

## 2023-11-12 RX ORDER — BUPIVACAINE HYDROCHLORIDE 2.5 MG/ML
INJECTION, SOLUTION EPIDURAL; INFILTRATION; INTRACAUDAL AS NEEDED
Status: DISCONTINUED | OUTPATIENT
Start: 2023-11-12 | End: 2023-11-12 | Stop reason: SURG

## 2023-11-12 RX ORDER — CARBOPROST TROMETHAMINE 250 UG/ML
250 INJECTION, SOLUTION INTRAMUSCULAR
Status: DISCONTINUED | OUTPATIENT
Start: 2023-11-12 | End: 2023-11-12 | Stop reason: HOSPADM

## 2023-11-12 RX ORDER — FENTANYL CITRATE 50 UG/ML
INJECTION, SOLUTION INTRAMUSCULAR; INTRAVENOUS AS NEEDED
Status: DISCONTINUED | OUTPATIENT
Start: 2023-11-12 | End: 2023-11-12 | Stop reason: SURG

## 2023-11-12 RX ORDER — ACETAMINOPHEN 325 MG/1
650 TABLET ORAL EVERY 6 HOURS PRN
Status: DISCONTINUED | OUTPATIENT
Start: 2023-11-12 | End: 2023-11-14 | Stop reason: HOSPADM

## 2023-11-12 RX ADMIN — SERTRALINE 150 MG: 100 TABLET, FILM COATED ORAL at 21:26

## 2023-11-12 RX ADMIN — Medication: at 17:22

## 2023-11-12 RX ADMIN — Medication 2 MILLI-UNITS/MIN: at 01:20

## 2023-11-12 RX ADMIN — WITCH HAZEL: 500 SOLUTION RECTAL; TOPICAL at 17:22

## 2023-11-12 RX ADMIN — LIDOCAINE HYDROCHLORIDE AND EPINEPHRINE 3 ML: 15; 5 INJECTION, SOLUTION EPIDURAL at 04:36

## 2023-11-12 RX ADMIN — BUPIVACAINE HYDROCHLORIDE 10 ML: 2.5 INJECTION, SOLUTION EPIDURAL; INFILTRATION; INTRACAUDAL; PERINEURAL at 04:38

## 2023-11-12 RX ADMIN — Medication 1 APPLICATION: at 17:22

## 2023-11-12 RX ADMIN — LIDOCAINE HYDROCHLORIDE AND EPINEPHRINE 2 ML: 15; 5 INJECTION, SOLUTION EPIDURAL at 04:37

## 2023-11-12 RX ADMIN — FENTANYL CITRATE 100 MCG: 50 INJECTION, SOLUTION INTRAMUSCULAR; INTRAVENOUS at 05:12

## 2023-11-12 RX ADMIN — DOCUSATE SODIUM 100 MG: 100 CAPSULE, LIQUID FILLED ORAL at 21:26

## 2023-11-12 RX ADMIN — SODIUM CHLORIDE, POTASSIUM CHLORIDE, SODIUM LACTATE AND CALCIUM CHLORIDE 125 ML/HR: 600; 310; 30; 20 INJECTION, SOLUTION INTRAVENOUS at 08:01

## 2023-11-12 RX ADMIN — IBUPROFEN 600 MG: 600 TABLET, FILM COATED ORAL at 21:26

## 2023-11-12 RX ADMIN — ROPIVACAINE HYDROCHLORIDE 13 ML/HR: 2 INJECTION, SOLUTION EPIDURAL; INFILTRATION at 04:44

## 2023-11-12 NOTE — ANESTHESIA PROCEDURE NOTES
Labor Epidural      Patient reassessed immediately prior to procedure    Patient location during procedure: OB  Performed By  Anesthesiologist: Kosta Ramos DO  Preanesthetic Checklist  Completed: patient identified, IV checked, site marked, risks and benefits discussed, surgical consent, monitors and equipment checked, pre-op evaluation and timeout performed  Prep:  Pt Position:sitting  Sterile Tech:gloves, mask, sterile barrier and cap  Prep:chlorhexidine gluconate and isopropyl alcohol  Monitoring:blood pressure monitoring and continuous pulse oximetry  Epidural Block Procedure:  Approach:midline  Guidance:landmark technique and palpation technique  Location:L3-L4  Needle Type:Tuohy  Needle Gauge:17 G  Loss of Resistance Medium: air  Loss of Resistance: 7cm  Cath Depth at skin:13 cm  Paresthesia: none  Aspiration:negative  Test Dose:negative  Number of Attempts: 1  Post Assessment:  Dressing:secured with tape and occlusive dressing applied (Tegaderm Placed)  Pt Tolerance:patient tolerated the procedure well with no apparent complications  Complications:no

## 2023-11-12 NOTE — H&P
"[facility]  Obstetric History and Physical    Referring Provider: Quin Ambriz MD      CC: Vaginal bleeding.    Subjective     Patient is a 28 y.o. female  currently at 36w2d, who presents with complaint of vaginal bleeding.  She has been working to labor and delivery today. Erin reports bright red vaginal bleeding with intermittent uterine contractions.  She denies recent trauma, fever, reports normal fetal activity.   course uncomplicated to date.        The following portions of the patients history were reviewed and updated as appropriate: current medications, allergies, past medical history, past surgical history, past family history, past social history, and problem list .       Prenatal Information:   Maternal Prenatal Labs  Blood Type ABO Type   Date Value Ref Range Status   2023 A  Final      Rh Status RH type   Date Value Ref Range Status   2023 Positive  Final      Antibody Screen Antibody Screen   Date Value Ref Range Status   2023 Negative  Final      Gonnorhea No results found for: \"GCCX\"   Chlamydia No results found for: \"CLAMYDCU\"   RPR No results found for: \"RPR\"   Syphilis Antibody No results found for: \"SYPHILIS\"   Rubella No results found for: \"RUBELLAIGGIN\"   Hepatitis B Surface Antigen No results found for: \"HEPBSAG\"   HIV-1 Antibody No results found for: \"LABHIV1\"   Hepatitis C Antibody No results found for: \"HEPCAB\"   Rapid Urin Drug Screen No results found for: \"AMPMETHU\", \"BARBITSCNUR\", \"LABBENZSCN\", \"LABMETHSCN\", \"LABOPIASCN\", \"THCURSCR\", \"COCAINEUR\", \"AMPHETSCREEN\", \"PROPOXSCN\", \"BUPRENORSCNU\", \"METAMPSCNUR\", \"OXYCODONESCN\", \"TRICYCLICSCN\"   Group B Strep Culture No results found for: \"GBSANTIGEN\"           External Prenatal Results       Pregnancy Outside Results - Transcribed From Office Records - See Scanned Records For Details       Test Value Date Time    ABO  A  23    Rh  Positive  23    Antibody Screen  Negative  " 23 1830       Negative  23 1150       Negative  23 1607       Negative  23 1632    Varicella IgG       Rubella  <0.90 index 23 1607    Hgb  10.3 g/dL 23 1837       11.3 g/dL 10/05/23 1451       10.9 g/dL 23 1150       13.4 g/dL 23 1607       13.5 g/dL 23 1632    Hct  32.1 % 23 1837       35.1 % 10/05/23 1451       33.0 % 23 1150       39.9 % 23 1607       41.0 % 23 1632    Glucose Fasting GTT       Glucose Tolerance Test 1 hour       Glucose Tolerance Test 3 hour       Gonorrhea (discrete)  Negative  23 1607    Chlamydia (discrete)  Negative  23 1607    RPR  Non-Reactive  21 1101    VDRL       Syphilis Antibody       HBsAg  Non-Reactive  23 1607    Herpes Simplex Virus PCR       Herpes Simplex VIrus Culture       HIV  Non-Reactive  23 1607    Hep C RNA Quant PCR       Hep C Antibody  Non-Reactive  23 1607    AFP       Group B Strep ^ NEG  21     GBS Susceptibility to Clindamycin       GBS Susceptibility to Erythromycin       Fetal Fibronectin       Genetic Testing, Maternal Blood                 Drug Screening       Test Value Date Time    Urine Drug Screen       Amphetamine Screen  Negative  23 1607    Barbiturate Screen  Negative  23 1607    Benzodiazepine Screen  Negative  23 1607    Methadone Screen  Negative  23 1607    Phencyclidine Screen  Negative  23 1607    Opiates Screen  Negative  23 1607    THC Screen  Negative  23 1607    Cocaine Screen       Propoxyphene Screen  Negative  23 1607    Buprenorphine Screen  Negative  23 1607    Methamphetamine Screen       Oxycodone Screen  Negative  23 1607    Tricyclic Antidepressants Screen  Negative  23 1607              Legend    ^: Historical                              Past OB History:       OB History    Para Term  AB Living   6 2 2 0 3 2   SAB IAB Ectopic Molar  Multiple Live Births   3 0 0 0 0 2      # Outcome Date GA Lbr Dwight/2nd Weight Sex Delivery Anes PTL Lv   6 Current            5 SAB 2023 6w0d          4 SAB 2022 6w0d          3 SAB 22 8w0d    Spon misscar      2 Term 21 37w0d  3405 g (7 lb 8.1 oz) F Vaginal unsp EPI N ZO      Name: JOHN BAUGH      Apgar1: 8  Apgar5: 9   1 Term 19 38w0d 20:04 / :23 3110 g (6 lb 13.7 oz) F Vag-Spont EPI N ZO      Complications: Chorioamnionitis      Name: JOHN BAUGH      Apgar1: 8  Apgar5: 9      Obstetric Comments   FOB #1 Pregnancy #1  at 38 weeks female   FOB #1 Pregnancy #2  at 37 weeks, female   FOB #1 Pregnancy #3 SAB at 8 weeks   FOB #1 Pregnancy #4 SAB at 6 weeks   FOB #1 Pregnancy #5 SAB at 6 weeks   FOB #1 Pregnancy #6 current       Past Medical History: Past Medical History:   Diagnosis Date    3/2023     Kidney stone 2019     (normal spontaneous vaginal delivery) 2019    Postpartum anemia 2021      Past Surgical History No past surgical history on file.   Family History: Family History   Problem Relation Age of Onset    No Known Problems Mother     Hypertension Father       Social History:  reports that she has never smoked. She has never used smokeless tobacco.   reports no history of alcohol use.   reports no history of drug use.                   General ROS Negative Findings:Headaches, Visual Changes, Epigastric pain, Anorexia, Nausia/Vomiting, and ROM    ROS     All other systems have been reviewed and are neg  Objective       Vital Signs Range for the last 24 hours  Temperature:     Temp Source:     BP:     Pulse:     Respirations:     SPO2:     O2 Amount (l/min):     O2 Devices     Weight:       Physical Examination:   General:   alert, appears stated age, and cooperative   Skin:   normal   HEENT: Sclera clear   Lungs:      Heart:      Gastrointestinal: Abdomen soft, gravid uterus, nontender, mild to moderate regular contractions palpated.  Nonacute    Lower Extremities: No edema, no calf tenderness   : External genitalia: normal general appearance  Uterus: enlarged   Musculoskeletal:     Neuro:           Presentation: Vertex   Cervix: Exam by:  Dany   Dilation:  2 to 3 cm   Effacement:  70% effaced   Station:  -2  Significant clotted blood noted on glove.       Fetal Heart Rate Assessment   Method:     Beats/min:     Baseline:     Varibility:     Accels:     Decels:     Tracing Category:     NST-indications vaginal bleeding, interpretation reactive, moderate variability, accelerations present 15 x 15, no fetal source noted, onset 1810, 1916, contraction for 3 to 5 minutes.  Uterine Assessment   Method:     Frequency (min):     Ctx Count in 10 min:     Duration:     Intensity:     Intensity by IUPC:     Resting Tone:     Resting Tone by IUPC:     [place] Units:       Laboratory Results:   Lab Results (last 24 hours)       Procedure Component Value Units Date/Time    Preeclampsia Panel [[id number]]  (Abnormal) Collected: 11/11/23 1837    Specimen: Blood Updated: 11/11/23 1916     Alkaline Phosphatase 118 U/L      ALT (SGPT) 45 U/L      AST (SGOT) 40 U/L      Comment: Specimen hemolyzed.  Result may be falsely elevated.        Creatinine 0.53 mg/dL      Total Bilirubin 0.4 mg/dL       U/L      Comment: Specimen hemolyzed.  Results may be affected.        Uric Acid 4.2 mg/dL     Fibrinogen [[id number]]  (Normal) Collected: 11/11/23 1837    Specimen: Blood Updated: 11/11/23 1854     Fibrinogen 470 mg/dL     CBC (No Diff) [[id number]]  (Abnormal) Collected: 11/11/23 1837    Specimen: Blood Updated: 11/11/23 1841     WBC 11.30 10*3/mm3      RBC 3.86 10*6/mm3      Hemoglobin 10.3 g/dL      Hematocrit 32.1 %      MCV 83.2 fL      MCH 26.7 pg      MCHC 32.1 g/dL      RDW 13.2 %      RDW-SD 39.7 fl      MPV 11.1 fL      Platelets 267 10*3/mm3           Radiology Review:   Imaging Results (Last 24 Hours)       ** No results found for the last 24 hours. **           Other Studies: Bedside ultrasound reveals single IUP vertex presentation, fetus active, anterior placenta without evidence of retroplacental hemorrhage.    Assessment & Plan       Vaginal bleeding        Assessment:  1.  Intrauterine pregnancy at 36w2d weeks gestation with reactive fetal status.    2.  Vaginal bleeding small placental abruption versus  labor  3.  GBS negative  4.  Maternal blood type A positive    Plan:  1.  Observation, IV status, labs, n.p.o.   2. Plan of care has been reviewed with patient.  3.  Risks, benefits of treatment plan have been discussed.  4.  All questions have been answered.  5 discussed with Dr. Pb Saenz DO  2023  19:25 EST

## 2023-11-12 NOTE — PROGRESS NOTES
11/12/23  12:41 EST  Erin Mckeon    SUBJECTIVE: Erin is resting in bed, feeling some pressure.     ASSESSMENTS:     /67   Pulse 88   Temp 98.8 °F (37.1 °C) (Oral)   Resp 16   LMP 03/14/2023     Fetal Heart Rate Assessment   Method: Fetal HR Assessment Method: external   Beats/min: Fetal HR (beats/min): 140   Baseline: Fetal HR Baseline: normal range   Variability: Fetal HR Variability: moderate (amplitude range 6 to 25 bpm)   Accels: Fetal HR Accelerations: absent   Decels: Fetal HR Decelerations: variable   Tracing Category:       Uterine Assessment   Method: Method: IUPC (intrauterine pressure catheter)   Frequency (min): Contraction Frequency (Minutes): 2-3   Ctx Count in 10 min:     Duration:     Intensity:     Intensity by IUPC:     Resting Tone:     Resting Tone by IUPC:       Presentation: vtx   Cervix: Exam by: Method: sterile exam per physician   Dilation:  9cm   Effacement: Cervical Effacement: 90%   Station:  0            PLAN: SVE as noted. Variable decels noted with ctx, will reposition. Reassess SVE in 30min.    Quin Ambriz MD  12:41 EST  11/12/23

## 2023-11-12 NOTE — PROGRESS NOTES
Gertrudis reports increasing discomfort with ctx. Repeat SVE unchanged, 2-3/50/-2, minimal blood on glove. FHR reassuring, category I. Ctx q2-3min on toco, will continue to observe overnight, is GBS neg.

## 2023-11-12 NOTE — LACTATION NOTE
11/12/23 1555   Maternal Information   Date of Referral 11/12/23   Person Making Referral lactation consultant  (courtesy)   Maternal Reason for Referral breastfeeding currently  (Pumped with first child for 11 months, nursed second with no problems.  Breastfeeding education done, information given.  Encouraged pumping after sluggish or skipped feeds.  Spectra pump sterilized and given from [employer]'s stock.)   Infant Reason for Referral 35-37 weeks gestation  (Mom states infant latched and nursed sluggishly in labor pham.  Infant currently in nsy.  Encouraged STS and q 3 hours feeds. Mom states she will call for lactation prn.  Small shield given at mom's request.)   Maternal Assessment   Breast Size Issue none   Breast Shape Bilateral:;round   Breast Density Bilateral:;soft   Nipples Bilateral:;everted   Left Nipple Symptoms intact;nontender   Right Nipple Symptoms intact;nontender   Maternal Infant Feeding   Maternal Emotional State receptive   Milk Expression/Equipment   Breast Pump Type double electric, personal   Equipment for Home Use breast pump provided  (from [employer] stock.  Supplies sterilized for mom's immediate use.)   Breast Pumping   Breast Pumping Interventions post-feed pumping encouraged;frequent pumping encouraged

## 2023-11-12 NOTE — ANESTHESIA PREPROCEDURE EVALUATION
Anesthesia Evaluation     Patient summary reviewed and Nursing notes reviewed                Airway   Mallampati: II  TM distance: >3 FB  Neck ROM: full  No difficulty expected  Dental      Pulmonary - negative pulmonary ROS   Cardiovascular - negative cardio ROS        Neuro/Psych- negative ROS  GI/Hepatic/Renal/Endo    (+) renal disease- stones    Musculoskeletal (-) negative ROS    Abdominal    Substance History - negative use     OB/GYN    (+) Pregnant        Other                    Anesthesia Plan    ASA 2     epidural       Anesthetic plan, risks, benefits, and alternatives have been provided, discussed and informed consent has been obtained with: patient.    Use of blood products discussed with patient .      CODE STATUS:    Level Of Support Discussed With: Patient  Code Status (Patient has no pulse and is not breathing): CPR (Attempt to Resuscitate)  Medical Interventions (Patient has pulse or is breathing): Full Support

## 2023-11-12 NOTE — L&D DELIVERY NOTE
" [facility]   Vaginal Delivery Note    Patient Name: Erin Mckeon  : 1995  MRN: [id number]    Date of Delivery: 2023     Diagnosis     Pre & Post-Delivery:  Intrauterine pregnancy at 36w3d  Labor status: Spontaneous Onset of Labor     Vaginal bleeding             Problem List    Transfer to Postpartum     Review the Delivery Report for details.     Delivery     Delivery: Vaginal, Spontaneous   YOB: 2021    Time of Birth:  Gestational Age 12:51 PM   36w3d     Anesthesia: Epidural   Delivering clinician: Quin Ambriz   Forceps?   No   Vacuum? No    Shoulder dystocia present: No        Delivery narrative:  Uncomplicated  at 36w3d over an intact perineum. Anterior shoulder delivered with ease. Infant vigorous at delivery so placed on maternal abdomen. Delayed cord clamping x 1 min. Cord doubly clamped and cut by father. Cord blood and cord segment obtained. Placenta delivered spontaneously and appeared intact. Careful inspection of the vagina and perineum revealed no lacerations.      Infant     Findings: female  infant     Infant observations: Weight: 2800 g (6 lb 2.8 oz)   Length: 19  in  Observations/Comments:        Apgars: 8  @ 1 minute /    9  @ 5 minutes   Infant Name: Conrad Jose     Placenta & Cord         Placenta delivered  Spontaneous  at   2023 12:55 PM     Cord: 3 vessels  present.   Nuchal Cord?  no   Cord blood obtained: Yes    Cord gases obtained:  No   Cord gas results: Venous:  No results found for: \"PHCVEN\", \"BECVEN\"    Arterial:  No results found for: \"PHCART\", \"BECART\"     Repair     Episiotomy: None     No    Lacerations: No   Estimated Blood Loss:  50cc     Complications     none    Disposition     Mother to Mother Baby/Postpartum  in stable condition currently.  Baby remains with mom in stable condition currently.    Quin Ambriz MD  23  13:06 EST        "

## 2023-11-13 LAB
HCT VFR BLD AUTO: 31.7 % (ref 34–46.6)
HGB BLD-MCNC: 10.2 G/DL (ref 12–15.9)

## 2023-11-13 PROCEDURE — 85018 HEMOGLOBIN: CPT | Performed by: OBSTETRICS & GYNECOLOGY

## 2023-11-13 PROCEDURE — 85014 HEMATOCRIT: CPT | Performed by: OBSTETRICS & GYNECOLOGY

## 2023-11-13 RX ADMIN — SERTRALINE 150 MG: 100 TABLET, FILM COATED ORAL at 19:43

## 2023-11-13 RX ADMIN — DOCUSATE SODIUM 100 MG: 100 CAPSULE, LIQUID FILLED ORAL at 19:43

## 2023-11-13 RX ADMIN — DOCUSATE SODIUM 100 MG: 100 CAPSULE, LIQUID FILLED ORAL at 08:08

## 2023-11-13 RX ADMIN — PRENATAL VITAMINS-IRON FUMARATE 27 MG IRON-FOLIC ACID 0.8 MG TABLET 1 TABLET: at 08:08

## 2023-11-13 NOTE — ANESTHESIA POSTPROCEDURE EVALUATION
Patient: Erin Mckeon    Procedure Summary       Date: 11/12/23 Room / Location:     Anesthesia Start: 0426 Anesthesia Stop: 1251    Procedure: LABOR ANALGESIA Diagnosis:     Scheduled Providers:  Provider: Kosta Ramos DO    Anesthesia Type: epidural ASA Status: 2            Anesthesia Type: epidural    Vitals  Vitals Value Taken Time   /77 11/13/23 0805   Temp 98.3 °F (36.8 °C) 11/13/23 0805   Pulse 96 11/13/23 0805   Resp 20 11/13/23 0805   SpO2 99 % 11/12/23 0439   Vitals shown include unfiled device data.        Post Anesthesia Care and Evaluation    Patient location during evaluation: bedside  Patient participation: complete - patient participated  Level of consciousness: awake and alert  Pain management: adequate    Airway patency: patent  Anesthetic complications: No anesthetic complications    Cardiovascular status: acceptable  Respiratory status: acceptable  Hydration status: acceptable  Post Neuraxial Block status: Motor and sensory function returned to baseline and No signs or symptoms of PDPH

## 2023-11-13 NOTE — PROGRESS NOTES
11/13/2023  PPD #1    Subjective   Erin feels well.  Patient describes her lochia as less than menses.  Pain is well controlled       Objective   Temp: Temp:  [98.4 °F (36.9 °C)-99.3 °F (37.4 °C)] 98.4 °F (36.9 °C) Temp src: Oral   BP: BP: (106-149)/(55-81) 121/69        Pulse: Heart Rate:  [] 81  RR: Resp:  [16-18] 16    General:  No acute distress   Abdomen: Fundus firm and beneath umbilicus   Pelvis: deferred     Lab Results   Component Value Date    WBC 11.30 (H) 11/11/2023    HGB 10.2 (L) 11/13/2023    HCT 31.7 (L) 11/13/2023    MCV 83.2 11/11/2023     11/11/2023    HEPBSAG Non-Reactive 05/12/2023    AST 40 (H) 11/11/2023    ALT 45 (H) 11/11/2023    URICACID 4.2 11/11/2023       Assessment  PPD# 1 after vaginal delivery    Plan  Continue routine postpartum care, anticipate discharge in a.m.      This note has been electronically signed.    Quin Ambriz MD  08:40 EST  November 13, 2023

## 2023-11-14 VITALS
TEMPERATURE: 98.6 F | RESPIRATION RATE: 20 BRPM | DIASTOLIC BLOOD PRESSURE: 78 MMHG | SYSTOLIC BLOOD PRESSURE: 124 MMHG | HEART RATE: 78 BPM

## 2023-11-14 PROCEDURE — 90715 TDAP VACCINE 7 YRS/> IM: CPT | Performed by: OBSTETRICS & GYNECOLOGY

## 2023-11-14 PROCEDURE — 90472 IMMUNIZATION ADMIN EACH ADD: CPT | Performed by: OBSTETRICS & GYNECOLOGY

## 2023-11-14 PROCEDURE — 25010000002 MEASLES, MUMPS & RUBELLA VAC RECONSTITUTED SOLUTION: Performed by: OBSTETRICS & GYNECOLOGY

## 2023-11-14 PROCEDURE — 90707 MMR VACCINE SC: CPT | Performed by: OBSTETRICS & GYNECOLOGY

## 2023-11-14 PROCEDURE — 25010000002 TETANUS-DIPHTH-ACELL PERTUSSIS 5-2.5-18.5 LF-MCG/0.5 SUSPENSION PREFILLED SYRINGE: Performed by: OBSTETRICS & GYNECOLOGY

## 2023-11-14 PROCEDURE — 90471 IMMUNIZATION ADMIN: CPT | Performed by: OBSTETRICS & GYNECOLOGY

## 2023-11-14 RX ADMIN — TETANUS TOXOID, REDUCED DIPHTHERIA TOXOID AND ACELLULAR PERTUSSIS VACCINE, ADSORBED 0.5 ML: 5; 2.5; 8; 8; 2.5 SUSPENSION INTRAMUSCULAR at 11:31

## 2023-11-14 RX ADMIN — MEASLES, MUMPS, AND RUBELLA VIRUS VACCINE LIVE 0.5 ML: 1000; 12500; 1000 INJECTION, POWDER, LYOPHILIZED, FOR SUSPENSION SUBCUTANEOUS at 11:31

## 2023-11-14 RX ADMIN — PRENATAL VITAMINS-IRON FUMARATE 27 MG IRON-FOLIC ACID 0.8 MG TABLET 1 TABLET: at 08:12

## 2023-11-14 RX ADMIN — DOCUSATE SODIUM 100 MG: 100 CAPSULE, LIQUID FILLED ORAL at 08:12

## 2023-11-14 NOTE — PLAN OF CARE
Goal Outcome Evaluation:  Plan of Care Reviewed With: patient, spouse        Progress: improving  Outcome Evaluation: Vitals WNL. Pt denies pain. She ambulates in room independently. Fundus is U/1, firm, and midline. Scant rubra present. She denies passing flatus. MMR and TDAP vaccines administered prior to D/C home late morning.

## 2023-11-14 NOTE — DISCHARGE SUMMARY
[facility]  Vaginal Delivery Discharge Summary      Patient: Erin Mckeon      MR#:[id number]  Admission  Diagnosis: IUP @ 36w2d, vaginal bleeding  Discharge Diagnosis: PPD#2 s/p  @ 36w3d    Date of Admission: 2023  Date of Discharge:  2023    Procedures:  Vaginal, Spontaneous     2023    12:51 PM      Service:  Obstetrics    Hospital Course:  Patient underwent vaginal delivery and remained in the hospital for 3 days.  During that time she remained afebrile and hemodynamically stable.  On the day of discharge, she was eating, ambulating and voiding without difficulty.      Lab Results   Component Value Date    WBC 11.30 (H) 2023    HGB 10.2 (L) 2023    HCT 31.7 (L) 2023    MCV 83.2 2023     2023    CREATININE 0.53 (L) 2023    URICACID 4.2 2023    AST 40 (H) 2023    ALT 45 (H) 2023     (H) 2023     Results from last 7 days   Lab Units 23  1830   ABO TYPING  A   RH TYPING  Positive   ANTIBODY SCREEN  Negative       Discharge Medications     Discharge Medications        Changes to Medications        Instructions Start Date   sertraline 50 MG tablet  Commonly known as: ZOLOFT  What changed: Another medication with the same name was removed. Continue taking this medication, and follow the directions you see here.   150 mg, Oral, Daily             Continue These Medications        Instructions Start Date   prenatal vitamin 27-0.8 27-0.8 MG tablet tablet   Oral, Daily             Stop These Medications      ondansetron ODT 4 MG disintegrating tablet  Commonly known as: ZOFRAN-ODT              Discharge Disposition:  To Home    Discharge Condition:  Stable    Discharge Diet: Regular    Activity at Discharge: Pelvic rest    Follow-up Appointments  6 weeks      Quin Ambriz MD  23  10:43 EST

## 2023-11-14 NOTE — LACTATION NOTE
11/14/23 0816   Maternal Information   Date of Referral 11/14/23   Person Making Referral patient   Maternal Reason for Referral latch difficulty   Infant Reason for Referral 35-37 weeks gestation   Maternal Assessment   Breast Shape Bilateral:;round   Breast Density Bilateral:;soft   Nipples Bilateral:;everted;short   Left Nipple Symptoms nontender;intact;redness   Right Nipple Symptoms nontender;intact   Maternal Infant Feeding   Maternal Emotional State receptive;independent   Infant Positioning clutch/football  (left)   Signs of Milk Transfer other (see comments)  (latch not achieved)   Latch Assistance none needed;verbal guidance offered     Patient request to see LC on day of discharge. Reports baby has not been latching well and does not stay awake at breast. Infant crying during diaper change when LC entered room. Immediately falls asleep when brought to breast. Unwilling to open mouth to latch, when she does open, she holds nipple shield in mouth. No latch achieved. Tight frenulum noted. Doesn't latch well without shield, causes lipstick shaped nipple. MOB reports some pinching pain even with shield. Unsure if older children had any oral restrictions, but reports oldest child never latched and she exclusively pumped. SLP consult placed at this time, baby is also only 36w.

## 2023-11-14 NOTE — PROGRESS NOTES
11/14/2023  PPD #2    Subjective   Erin feels well.  Patient describes her lochia as less than menses.  Pain is well controlled       Objective   Temp: Temp:  [98.1 °F (36.7 °C)-98.6 °F (37 °C)] 98.6 °F (37 °C) Temp src: Oral   BP: BP: (116-124)/(65-83) 124/78        Pulse: Heart Rate:  [76-78] 78  RR: Resp:  [16-20] 20    General:  No acute distress   Abdomen: Fundus firm and beneath umbilicus   Pelvis: deferred     Lab Results   Component Value Date    WBC 11.30 (H) 11/11/2023    HGB 10.2 (L) 11/13/2023    HCT 31.7 (L) 11/13/2023    MCV 83.2 11/11/2023     11/11/2023    HEPBSAG Non-Reactive 05/12/2023    AST 40 (H) 11/11/2023    ALT 45 (H) 11/11/2023    URICACID 4.2 11/11/2023       Assessment  PPD# 2 after vaginal delivery    Plan  Discharge to home  Follow up with [facility]  in 6 weeks  Advised no tampons, intercourse, or tub baths for 6 weeks.       This note has been electronically signed.    Quin Ambriz MD  10:40 EST  November 14, 2023

## 2024-07-05 ENCOUNTER — TELEMEDICINE (OUTPATIENT)
Dept: FAMILY MEDICINE CLINIC | Facility: TELEHEALTH | Age: 29
End: 2024-07-05
Payer: COMMERCIAL

## 2024-07-05 DIAGNOSIS — J01.00 ACUTE NON-RECURRENT MAXILLARY SINUSITIS: Primary | ICD-10-CM

## 2024-07-05 RX ORDER — CEFDINIR 300 MG/1
300 CAPSULE ORAL 2 TIMES DAILY
Qty: 14 CAPSULE | Refills: 0 | Status: SHIPPED | OUTPATIENT
Start: 2024-07-05 | End: 2024-07-12

## 2024-07-05 RX ORDER — FLUTICASONE PROPIONATE 50 MCG
2 SPRAY, SUSPENSION (ML) NASAL DAILY
Qty: 16 ML | Refills: 0 | Status: SHIPPED | OUTPATIENT
Start: 2024-07-05

## 2024-07-05 NOTE — PROGRESS NOTES
Subjective   Chief Complaint   Patient presents with    Sinusitis       Erin Mckeon is a 28 y.o. female.     History of Present Illness  Patient reports having a cold that started 2 to 3 weeks ago.  Symptoms have waxed and waned since then.  She is now experiencing a lot of sinus congestion, sinus pain and pressure in her cheeks that radiates to the jaw and behind the eyes.  Sinus pain is causing her to have migraine headaches.  She is also having foul tasting postnasal drainage with green nasal discharge.  She feels like she has a sinus infection.  She has not really treated symptoms over-the-counter because she is currently breast-feeding.  Sinusitis  This is a new problem. Episode onset: 2-3 weeks. The problem has been gradually worsening since onset. There has been no fever. Associated symptoms include congestion, headaches and sinus pressure. Pertinent negatives include no chills, coughing, diaphoresis, ear pain, neck pain, shortness of breath, sore throat or swollen glands. Past treatments include nothing.        Allergies   Allergen Reactions    Penicillins Swelling     Orbital swelling       Past Medical History:   Diagnosis Date    3/2023     Kidney stone      (normal spontaneous vaginal delivery) 2019    Postpartum anemia 2021       History reviewed. No pertinent surgical history.    Social History     Socioeconomic History    Marital status:    Tobacco Use    Smoking status: Never    Smokeless tobacco: Never   Vaping Use    Vaping status: Never Used   Substance and Sexual Activity    Alcohol use: No    Drug use: No    Sexual activity: Defer       Family History   Problem Relation Age of Onset    No Known Problems Mother     Hypertension Father          Current Outpatient Medications:     cefdinir (OMNICEF) 300 MG capsule, Take 1 capsule by mouth 2 (Two) Times a Day for 7 days., Disp: 14 capsule, Rfl: 0    fluticasone (FLONASE) 50 MCG/ACT nasal spray, 2 sprays into the  nostril(s) as directed by provider Daily., Disp: 16 mL, Rfl: 0    Prenatal Vit-Fe Fumarate-FA (prenatal vitamin 27-0.8) 27-0.8 MG tablet tablet, Take  by mouth Daily., Disp: , Rfl:     sertraline (ZOLOFT) 50 MG tablet, Take 3 tablets by mouth Daily., Disp: 90 tablet, Rfl: 0    sertraline (ZOLOFT) 50 MG tablet, Take 3 tablets by mouth once daily., Disp: 90 tablet, Rfl: 12      Review of Systems   Constitutional:  Negative for chills, diaphoresis, fatigue and fever.   HENT:  Positive for congestion, postnasal drip and sinus pressure. Negative for ear pain, sore throat and swollen glands.    Respiratory:  Negative for cough and shortness of breath.    Gastrointestinal: Negative.    Musculoskeletal:  Negative for neck pain.   Neurological:  Positive for headache.        There were no vitals filed for this visit.    Objective   Physical Exam  Constitutional:       General: She is not in acute distress.     Appearance: Normal appearance. She is not ill-appearing, toxic-appearing or diaphoretic.   HENT:      Head: Normocephalic.      Nose: Congestion present.      Right Sinus: Maxillary sinus tenderness and frontal sinus tenderness present.      Left Sinus: Maxillary sinus tenderness and frontal sinus tenderness present.      Comments: Per pt       Mouth/Throat:      Lips: Pink.      Mouth: Mucous membranes are moist.   Pulmonary:      Effort: Pulmonary effort is normal.   Neurological:      Mental Status: She is alert and oriented to person, place, and time.          Procedures     Assessment & Plan   Diagnoses and all orders for this visit:    1. Acute non-recurrent maxillary sinusitis (Primary)  -     cefdinir (OMNICEF) 300 MG capsule; Take 1 capsule by mouth 2 (Two) Times a Day for 7 days.  Dispense: 14 capsule; Refill: 0  -     fluticasone (FLONASE) 50 MCG/ACT nasal spray; 2 sprays into the nostril(s) as directed by provider Daily.  Dispense: 16 mL; Refill: 0            PLAN: Discussed dosing, side effects,  recommended other symptomatic care.  Patient should follow up with primary care provider, Urgent Care or ER if symptoms worsen, fail to resolve or other symptoms need attention. Patient/family agree to the above.         STEPHAN Hernandez     The use of a video visit has been reviewed with the patient and verbal informed consent has been obtained. Myself and Erin Mckeon participated in this visit. The patient is located at [address] [place] [address]. I am located in [place], [place]. Mychart and Zoom were utilized.        This visit was performed via Telehealth.  This patient has been instructed to follow-up with their primary care provider if their symptoms worsen or the treatment provided does not resolve their illness.

## 2025-01-21 ENCOUNTER — OFFICE VISIT (OUTPATIENT)
Dept: BEHAVIORAL HEALTH | Facility: CLINIC | Age: 30
End: 2025-01-21
Payer: COMMERCIAL

## 2025-01-21 VITALS
BODY MASS INDEX: 35.73 KG/M2 | HEART RATE: 90 BPM | WEIGHT: 182 LBS | HEIGHT: 60 IN | DIASTOLIC BLOOD PRESSURE: 60 MMHG | SYSTOLIC BLOOD PRESSURE: 110 MMHG | OXYGEN SATURATION: 98 %

## 2025-01-21 DIAGNOSIS — F41.1 GENERALIZED ANXIETY DISORDER: Primary | ICD-10-CM

## 2025-01-21 RX ORDER — ESCITALOPRAM OXALATE 10 MG/1
10 TABLET ORAL DAILY
Qty: 30 TABLET | Refills: 0 | Status: SHIPPED | OUTPATIENT
Start: 2025-01-21

## 2025-01-21 NOTE — PROGRESS NOTES
New Patient Office Visit      Patient Name: Erin Mckeon  : 1995   MRN: [id number]     Referring Provider: Elena Esparza MD    Chief Complaint:      ICD-10-CM ICD-9-CM   1. Generalized anxiety disorder  F41.1 300.02        History of Present Illness:   Erin Mckeon is a 29 y.o. female who is here today for initial evaluation. She reports a history of anxiety and irritability that she previously treated with sertraline for about five years. She received several dose increases during that time which were effective in the short term but increasing sedation was problematic. She discontinued this medication a few weeks ago with no reported discontinuation effects. She denies any other side effects with this treatment. She denies current or recent concerns for depression. She does endorse anxiety and feels she has a short fuse that leads to increased irritability. She has good insight into her irritability and understands when her response to situations is not ideal. She states that her anxiety can be provoked by small things and that these feelings of anxiety/irritability can quickly consume her day. She endorses good sleep and appetite. She denies AVH. She denies symptoms indicative of dashawn/hypomania. She does have concerns about possible ADHD. She denies being diagnosed as a child but did struggle in school as a result of a learning disability. Her current symptoms intensified at around the same time that the became pregnant with her first child. In the past several years she has had three children and has therefore dealt with a lot of life and routine changes in recent years. She is not currently pregnant or breastfeeding and has no plans to become pregnant.      Current Medications:   None    Therapy: Denies     Subjective     Past Medical History:   Past Medical History:   Diagnosis Date    3/2023     Kidney stone      (normal spontaneous vaginal delivery) 2019    Postpartum  anemia 08/03/2021       Past Surgical History:   History reviewed. No pertinent surgical history.    Family History:   Family History   Problem Relation Age of Onset    No Known Problems Mother     Hypertension Father        Family Psychiatric History:  Father: takes paroxetine     Screening Scores:   PHQ-9: PHQ-9 Depression Screening  Little interest or pleasure in doing things? Not at all   Feeling down, depressed, or hopeless? Not at all   PHQ-2 Total Score 0   Trouble falling or staying asleep, or sleeping too much? Not at all   Feeling tired or having little energy? Not at all   Poor appetite or overeating? Not at all   Feeling bad about yourself - or that you are a failure or have let yourself or your family down? Almost all   Trouble concentrating on things, such as reading the newspaper or watching television? Not at all   Moving or speaking so slowly that other people could have noticed? Or the opposite - being so fidgety or restless that you have been moving around a lot more than usual? Not at all   Thoughts that you would be better off dead, or of hurting yourself in some way? Not at all   PHQ-9 Total Score 3   If you checked off any problems, how difficult have these problems made it for you to do your work, take care of things at home, or get along with other people? Extremely difficult      and СЕРГЕЙ 7: СЕРГЕЙ 7 Total Score: 9 Patient screened positive for depression based on a PHQ-9 score of 3 on 1/21/2025. Follow-up recommendations include: Prescribed antidepressant medication treatment and Suicide Risk Assessment performed.     Psychiatric History:     Previous medications: sertraline: taken since 2020, went up to 200 mg daily, recently discontinued a few weeks ago due to sedation, helped for a little bit, no other negative side effects    Inpatient admissions: Denies  History of suicide/self harm attempts: Denies   Family history of suicide or attempts: Denies  Trauma/Abuse History: Denies     RISK  "ASSESSMENT:    Does patient currently have intent, plan, ideation for suicide? Denies  Access to firearms or weapons: Yes, safely stored   History of homicidal ideation: Denies  Risk Taking/Impulsive Behavior (current or past): No Describe: None   Protective factors: Family     Social History:    Highest level of education obtained: college  Living situation: Lives with three kids,   Patient's Occupation: RN; labor and delivery   Leisure and Recreation: Time with family  Support system: Yes  Illicit substance use: Denies  Alcohol use: Denies  Tobacco use: Denies   Caffeine use: Yes, soda 3 daily  OTC/Supplement use: Denies     Legal History:   None    Medications:     Current Outpatient Medications:     escitalopram (Lexapro) 10 MG tablet, Take 1 tablet by mouth Daily., Disp: 30 tablet, Rfl: 0    Medication Considerations:  DARIUS reviewed and appropriate.      Allergies:   Allergies   Allergen Reactions    Penicillins Swelling     Orbital swelling       Objective     Physical Exam:  Vital Signs:   Vitals:    01/21/25 1008   BP: 110/60   Pulse: 90   SpO2: 98%   Weight: 82.6 kg (182 lb)   Height: 152.4 cm (60\")     Body mass index is 35.54 kg/m².     Mental Status Exam:   Hygiene:   good  Cooperation:  Cooperative  Eye Contact:  Good  Psychomotor Behavior:  Appropriate  Affect:  Appropriate  Mood: sad  Speech:  Normal  Thought Process:  Goal directed and Linear  Thought Content:  Normal  Suicidal:  None  Homicidal:  None  Hallucinations:  None  Delusion:  None  Memory:  Intact  Orientation:  Person, Place, Time, and Situation  Reliability:  good  Insight:  Good  Judgement:  Good  Impulse Control:  Good      Assessment / Plan      Visit Diagnosis/Orders Placed This Visit:  Diagnoses and all orders for this visit:    1. Generalized anxiety disorder (Primary)  -     escitalopram (Lexapro) 10 MG tablet; Take 1 tablet by mouth Daily.  Dispense: 30 tablet; Refill: 0       Functional Status: Moderate impairment "     Prognosis: Good with Ongoing Treatment     Impression/Formulation:  Patient appeared alert and oriented.  Patient is voluntarily requesting to begin psychiatric medication management at [facility].  Patient is receptive to assistance with maintaining a stable lifestyle.  Patient presents with history of anxiety/irritability that was previously well managed with sertraline. Over time, increasing doses of sertraline became ineffective and caused sedation. She stopped this medication on her own a few weeks ago with no ill effects. She reports her energy levels have improved since stopping it. She has never taken other medications for her mental health. She reports no other current concerns. She is curious about ADHD which is possible given some struggles in school as a child, but she also reports dyslexia which impacted her school years. Given that her concerns reportedly began at the same time as her pregnancy, we discussed the possibility that the hormonal changes and related changes/life stress of three pregnancies in the past five years have made an impact on her mental health. Despite young children in the home, she does report good sleep. Given her positive response to an SSRI in the past, she was agreeable to trial of escitalopram. She denies any other questions or concerns at this time.       ICD-10-CM ICD-9-CM   1. Generalized anxiety disorder  F41.1 300.02   .     Treatment Plan:   Initiate escitalopram (Lexapro) 10 mg daily    Patient will continue supportive psychotherapy efforts and medications as indicated. Clinic will obtain release of information for current treatment team for continuity of care as needed. Patient will contact this office, call 911 or present to the nearest emergency room should suicidal or homicidal ideations occur. Discussed medication options and treatment plan of prescribed medication(s) as well as the risks, benefits, and potential side effects. Patient  ackowledged and verbally consented to continue with current treatment plan and was educated on the importance of compliance with treatment and follow-up appointments.     Follow Up:   Return in about 4 weeks (around 2/18/2025) for Next scheduled follow up. It was a pleasure meeting with you today. Thank you for allowing me to be a part of your care. Please call with any questions or concerns prior to our next appointment.        STEPHAN Pelayo   [facility]     This is electronically signed by STEPHAN Pelayo   01/21/2025 11:22 EST

## 2025-03-20 ENCOUNTER — TELEMEDICINE (OUTPATIENT)
Dept: BEHAVIORAL HEALTH | Facility: CLINIC | Age: 30
End: 2025-03-20
Payer: COMMERCIAL

## 2025-03-20 VITALS — WEIGHT: 175 LBS | HEIGHT: 60 IN | BODY MASS INDEX: 34.36 KG/M2

## 2025-03-20 DIAGNOSIS — F41.1 GENERALIZED ANXIETY DISORDER: Primary | ICD-10-CM

## 2025-03-20 RX ORDER — VENLAFAXINE HYDROCHLORIDE 37.5 MG/1
37.5 CAPSULE, EXTENDED RELEASE ORAL DAILY
Qty: 30 CAPSULE | Refills: 0 | Status: SHIPPED | OUTPATIENT
Start: 2025-03-20

## 2025-03-20 NOTE — PROGRESS NOTES
Video Visit      Patient Name: Erin Mckeon  : 1995   MRN: [id number]     Referring Provider: Elena Esparza MD    Chief Complaint:      ICD-10-CM ICD-9-CM   1. Generalized anxiety disorder  F41.1 300.02        This provider is located at the [facility] (through [facility] [address] [place] [address] using a secure MyChart Video Visit through [employer]. Patient is being seen remotely via telehealth video visit at their home address in [place], and stated they are in a secure environment for this session. The patient's condition being diagnosed/treated is appropriate for telemedicine. The provider identified herself as well as her credentials. The patient, and/or patients guardian, consent to be seen remotely, and when consent is given they understand that the consent allows for patient identifiable information to be sent to a third party as needed. They may refuse to be seen remotely at any time. The electronic data is encrypted and password protected, and the patient and/or guardian has been advised of the potential risks to privacy not withstanding such measures.    The patient has chosen to receive care today through a telehealth video visit. Do you consent to use a video/audio connection for your medical care today? Yes    Treatment Plan from Previous Visit 25:   Initiate escitalopram (Lexapro) 10 mg daily     History of Present Illness:   Erin Mckeon is a 29 y.o. female who is being seen by a video visit today for follow up related to anxiety    Subjective   Changes to physical health or medications since last visit? Denies      Patient Reports: She discontinued the escitalopram due to lack of any benefit. She denies any negative side effects associated with this medication.       Screening Scores:   PHQ-9 : 0  СЕРГЕЙ-7 : 10    PHQ-9 at last visit: 3  СЕРГЕЙ 7 at last visit: 9    RISK ASSESSMENT:  Patient denies any high risk factors  "today.     Medications:     Current Outpatient Medications:     venlafaxine XR (Effexor XR) 37.5 MG 24 hr capsule, Take 1 capsule by mouth Daily., Disp: 30 capsule, Rfl: 0    Medication Considerations:  DARIUS reviewed and appropriate.      Allergies:   Allergies   Allergen Reactions    Penicillins Swelling     Orbital swelling       Objective     Physical Exam:  Vital Signs:   Vitals:    03/20/25 1304   Weight: 79.4 kg (175 lb)   Height: 152.4 cm (60\")     Body mass index is 34.18 kg/m².     Mental Status Exam:   Hygiene:   good  Cooperation:  Cooperative  Eye Contact:  Good  Psychomotor Behavior:  Appropriate  Affect:  Appropriate  Mood: normal  Speech:  Normal  Thought Process:  Goal directed and Linear  Thought Content:  Normal  Suicidal:  None  Homicidal:  None  Hallucinations:  None  Delusion:  None  Memory:  Intact  Orientation:  Person, Place, Time, and Situation  Reliability:  good  Insight:  Good  Judgement:  Good  Impulse Control:  Good      Assessment / Plan      Visit Diagnosis/Orders Placed This Visit:  Diagnoses and all orders for this visit:    1. Generalized anxiety disorder (Primary)  -     venlafaxine XR (Effexor XR) 37.5 MG 24 hr capsule; Take 1 capsule by mouth Daily.  Dispense: 30 capsule; Refill: 0       Functional Status: Mild impairment     Prognosis: Good with Ongoing Treatment     Impression/Formulation:  Patient appeared alert and oriented.  Patient is voluntarily requesting to continue outpatient therapy at [facility].  Patient is receptive to assistance with maintaining a stable lifestyle.  Patient presents with history of СЕРГЕЙ not currently well managed. Previously took sertraline 200 mg but this lost effectiveness and she had discontinued this prior to initial evaluation at this office. Trial of escitalopram was recently discontinued due to lack of efficacy. Discussed other options and patient agreeable to trial of venlafaxine XR. Patient educated on possible " discontinuation or missed dose effects. Patient agreeable and medication sent to preferred pharmacy. Patient denies other concerns or questions to address today.     ICD-10-CM ICD-9-CM   1. Generalized anxiety disorder  F41.1 300.02   .     Treatment Plan:     Discontinue escitalopram (Lexapro) 10 mg daily-already done  Start venlafaxine XR (Effexor) 37.5 mg daily     Patient will continue supportive psychotherapy efforts and medications as indicated. Clinic will obtain release of information for current treatment team for continuity of care as needed. Patient will contact this office, call 911 or present to the nearest emergency room should suicidal or homicidal ideations occur. Discussed medication options and treatment plan of prescribed medication(s) as well as the risks, benefits, and potential side effects. Patient ackowledged and verbally consented to continue with current treatment plan and was educated on the importance of compliance with treatment and follow-up appointments.     Follow Up:   Return in about 4 weeks (around 4/17/2025) for Next scheduled follow up.  It was so nice to see you today. Please let me know if you have any questions before our next appointment.        STEPHAN Pelayo   [facility]     This is electronically signed by STEPHAN Pelayo   03/20/2025 13:19 EDT

## 2025-04-17 ENCOUNTER — TELEMEDICINE (OUTPATIENT)
Dept: BEHAVIORAL HEALTH | Facility: CLINIC | Age: 30
End: 2025-04-17
Payer: COMMERCIAL

## 2025-04-17 ENCOUNTER — TELEPHONE (OUTPATIENT)
Dept: BEHAVIORAL HEALTH | Facility: CLINIC | Age: 30
End: 2025-04-17

## 2025-04-17 VITALS — BODY MASS INDEX: 34.55 KG/M2 | WEIGHT: 176 LBS | HEIGHT: 60 IN

## 2025-04-17 DIAGNOSIS — F41.1 GENERALIZED ANXIETY DISORDER: Primary | ICD-10-CM

## 2025-04-17 RX ORDER — DESVENLAFAXINE 25 MG/1
25 TABLET, EXTENDED RELEASE ORAL DAILY
Qty: 30 TABLET | Refills: 0 | Status: SHIPPED | OUTPATIENT
Start: 2025-04-17

## 2025-04-17 RX ORDER — BUSPIRONE HYDROCHLORIDE 5 MG/1
5 TABLET ORAL 2 TIMES DAILY
Qty: 60 TABLET | Refills: 0 | Status: SHIPPED | OUTPATIENT
Start: 2025-04-17

## 2025-04-17 NOTE — PROGRESS NOTES
Video Visit      Patient Name: Erin Mckeon  : 1995   MRN: [id number]     Referring Provider: Elena Esparza MD    Chief Complaint:      ICD-10-CM ICD-9-CM   1. Generalized anxiety disorder  F41.1 300.02        This provider is located at the [facility] (through [facility]), [address] [place] [address] using a secure [employer] Video Visit through [email or web address]. Patient is being seen remotely via telehealth video visit at their home address in [place], and stated they are in a secure environment for this session. The patient's condition being diagnosed/treated is appropriate for telemedicine. The provider identified herself as well as her credentials. The patient, and/or patients guardian, consent to be seen remotely, and when consent is given they understand that the consent allows for patient identifiable information to be sent to a third party as needed. They may refuse to be seen remotely at any time. The electronic data is encrypted and password protected, and the patient and/or guardian has been advised of the potential risks to privacy not withstanding such measures.    The patient has chosen to receive care today through a telehealth video visit. Do you consent to use a video/audio connection for your medical care today? Yes    Treatment Plan from Previous Visit 3/20/25:   Discontinue escitalopram (Lexapro) 10 mg daily-already done  Start venlafaxine XR (Effexor) 37.5 mg daily     History of Present Illness:   Erin Mckeon is a 29 y.o. female who is being seen by a video visit today for follow up for anxiety.     Subjective   Changes to physical health or medications since last visit? Denies      Patient Reports: She notices minimal benefit with the venlafaxine and has been very sleepy/groggy despite trying different medication administration times. She is still struggling with being able to relax and turn the racing thoughts in her brain  "off.     Screening Scores:   PHQ-9 : 0  СЕРГЕЙ-7 : 8    PHQ-9 at last visit: 0  СЕРГЕЙ 7 at last visit: 10    RISK ASSESSMENT:  Patient denies any thoughts of suicide or intent today. Patient denies any suicidal or homicidal ideation today. Patient denies any high risk factors today.     Medications:     Current Outpatient Medications:     busPIRone (BUSPAR) 5 MG tablet, Take 1 tablet by mouth 2 (Two) Times a Day., Disp: 60 tablet, Rfl: 0    Desvenlafaxine Succinate ER 25 MG tablet sustained-release 24 hour, Take 1 tablet by mouth Daily., Disp: 30 tablet, Rfl: 0    Medication Considerations:  DARIUS reviewed and appropriate.      Allergies:   Allergies   Allergen Reactions    Penicillins Swelling     Orbital swelling       Objective     Physical Exam:  Vital Signs:   Vitals:    04/17/25 0907   Weight: 79.8 kg (176 lb)   Height: 152.4 cm (60\")     Body mass index is 34.37 kg/m².     Mental Status Exam:   Hygiene:   good  Cooperation:  Cooperative  Eye Contact:  Good  Psychomotor Behavior:  Appropriate  Affect:  Appropriate  Mood: normal  Speech:  Normal  Thought Process:  Goal directed and Linear  Thought Content:  Normal  Suicidal:  None  Homicidal:  None  Hallucinations:  None  Delusion:  None  Memory:  Intact  Orientation:  Person, Place, Time, and Situation  Reliability:  good  Insight:  Good  Judgement:  Good  Impulse Control:  Good      Assessment / Plan      Visit Diagnosis/Orders Placed This Visit:  Diagnoses and all orders for this visit:    1. Generalized anxiety disorder (Primary)  -     Desvenlafaxine Succinate ER 25 MG tablet sustained-release 24 hour; Take 1 tablet by mouth Daily.  Dispense: 30 tablet; Refill: 0  -     busPIRone (BUSPAR) 5 MG tablet; Take 1 tablet by mouth 2 (Two) Times a Day.  Dispense: 60 tablet; Refill: 0       Functional Status: Moderate impairment     Prognosis: Good with Ongoing Treatment     Impression/Formulation:  Patient appeared alert and oriented.  Patient is voluntarily " requesting to continue outpatient therapy at [facility].  Patient is receptive to assistance with maintaining a stable lifestyle.  Patient presents with history of:     ICD-10-CM ICD-9-CM   1. Generalized anxiety disorder  F41.1 300.02   Not currently well controlled. She does not feel sufficient relief with the venlafaxine but does feel it has been more effective than the escitalopram and sertraline tried previously. She has had some intolerable sedation with the venlafaxine. She is receptive to trial of desvenlafaxine with adjunct buspirone. Both prescriptions sent to preferred pharmacy. She denies other questions or concerns to address today.     Treatment Plan:     Discontinue venlafaxine XR (Effexor) 37.5 mg daily   Start desvenlafaxine (Pristiq) 25 mg daily  Start buspirone (Buspar) 5 mg BID     Patient will continue supportive psychotherapy efforts and medications as indicated. Clinic will obtain release of information for current treatment team for continuity of care as needed. Patient will contact this office, call 911 or present to the nearest emergency room should suicidal or homicidal ideations occur. Discussed medication options and treatment plan of prescribed medication(s) as well as the risks, benefits, and potential side effects. Patient ackowledged and verbally consented to continue with current treatment plan and was educated on the importance of compliance with treatment and follow-up appointments.     Follow Up:   Return in about 4 weeks (around 5/15/2025) for Next scheduled follow up.  It was so nice to see you today. Please let me know if you have any questions before our next appointment.        STEPHAN Pelayo   [facility]     This is electronically signed by STEPHAN Pelayo   04/17/2025 09:28 EDT